# Patient Record
Sex: FEMALE | Race: WHITE | NOT HISPANIC OR LATINO | Employment: UNEMPLOYED | URBAN - METROPOLITAN AREA
[De-identification: names, ages, dates, MRNs, and addresses within clinical notes are randomized per-mention and may not be internally consistent; named-entity substitution may affect disease eponyms.]

---

## 2017-01-24 ENCOUNTER — TELEPHONE (OUTPATIENT)
Dept: PAIN MEDICINE | Facility: CLINIC | Age: 72
End: 2017-01-24
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
## 2017-07-20 ENCOUNTER — HOSPITAL ENCOUNTER (INPATIENT)
Facility: HOSPITAL | Age: 72
LOS: 5 days | Discharge: HOME | DRG: 407 | End: 2017-07-25
Attending: INTERNAL MEDICINE | Admitting: INTERNAL MEDICINE
Payer: MEDICARE

## 2017-07-20 DIAGNOSIS — K76.89 HEPATIC CYST: Primary | ICD-10-CM

## 2017-07-20 DIAGNOSIS — R10.9 ABDOMINAL PAIN: ICD-10-CM

## 2017-07-20 PROBLEM — K66.8 BILOMA: Status: ACTIVE | Noted: 2017-07-20

## 2017-07-20 PROBLEM — R10.11 RIGHT UPPER QUADRANT ABDOMINAL PAIN: Status: ACTIVE | Noted: 2017-07-20

## 2017-07-20 LAB
BASOPHILS # BLD AUTO: 0.01 K/UL
BASOPHILS NFR BLD: 0.1 %
DIFFERENTIAL METHOD: ABNORMAL
EOSINOPHIL # BLD AUTO: 0.1 K/UL
EOSINOPHIL NFR BLD: 0.6 %
ERYTHROCYTE [DISTWIDTH] IN BLOOD BY AUTOMATED COUNT: 13.2 %
HCT VFR BLD AUTO: 36.6 %
HGB BLD-MCNC: 12.3 G/DL
LYMPHOCYTES # BLD AUTO: 1.6 K/UL
LYMPHOCYTES NFR BLD: 19.5 %
MCH RBC QN AUTO: 29.5 PG
MCHC RBC AUTO-ENTMCNC: 33.6 G/DL
MCV RBC AUTO: 88 FL
MONOCYTES # BLD AUTO: 0.6 K/UL
MONOCYTES NFR BLD: 7.7 %
NEUTROPHILS # BLD AUTO: 5.8 K/UL
NEUTROPHILS NFR BLD: 72 %
PLATELET # BLD AUTO: 215 K/UL
PMV BLD AUTO: 9.6 FL
RBC # BLD AUTO: 4.17 M/UL
WBC # BLD AUTO: 8.09 K/UL

## 2017-07-20 PROCEDURE — 99223 1ST HOSP IP/OBS HIGH 75: CPT | Mod: AI,GC,, | Performed by: HOSPITALIST

## 2017-07-20 PROCEDURE — 85025 COMPLETE CBC W/AUTO DIFF WBC: CPT | Mod: 91

## 2017-07-20 PROCEDURE — 36415 COLL VENOUS BLD VENIPUNCTURE: CPT

## 2017-07-20 PROCEDURE — 25000003 PHARM REV CODE 250: Performed by: STUDENT IN AN ORGANIZED HEALTH CARE EDUCATION/TRAINING PROGRAM

## 2017-07-20 PROCEDURE — 12000002 HC ACUTE/MED SURGE SEMI-PRIVATE ROOM

## 2017-07-20 RX ORDER — OXYCODONE AND ACETAMINOPHEN 5; 325 MG/1; MG/1
1 TABLET ORAL EVERY 4 HOURS PRN
Status: DISCONTINUED | OUTPATIENT
Start: 2017-07-20 | End: 2017-07-24

## 2017-07-20 RX ORDER — HYDROCHLOROTHIAZIDE 25 MG/1
25 TABLET ORAL DAILY
Status: DISCONTINUED | OUTPATIENT
Start: 2017-07-21 | End: 2017-07-25 | Stop reason: HOSPADM

## 2017-07-20 RX ORDER — BENAZEPRIL HYDROCHLORIDE 20 MG/1
20 TABLET ORAL DAILY
Status: DISCONTINUED | OUTPATIENT
Start: 2017-07-21 | End: 2017-07-25 | Stop reason: HOSPADM

## 2017-07-20 RX ORDER — IBUPROFEN 200 MG
16 TABLET ORAL
Status: DISCONTINUED | OUTPATIENT
Start: 2017-07-20 | End: 2017-07-25 | Stop reason: HOSPADM

## 2017-07-20 RX ORDER — CARTEOLOL HYDROCHLORIDE 10 MG/ML
1 SOLUTION OPHTHALMIC 2 TIMES DAILY
Status: DISCONTINUED | OUTPATIENT
Start: 2017-07-20 | End: 2017-07-21

## 2017-07-20 RX ORDER — TRAVOPROST OPHTHALMIC SOLUTION 0.04 MG/ML
1 SOLUTION OPHTHALMIC NIGHTLY
Status: DISCONTINUED | OUTPATIENT
Start: 2017-07-20 | End: 2017-07-21

## 2017-07-20 RX ORDER — IBUPROFEN 200 MG
200 TABLET ORAL EVERY 4 HOURS PRN
Status: DISCONTINUED | OUTPATIENT
Start: 2017-07-20 | End: 2017-07-21

## 2017-07-20 RX ORDER — CIPROFLOXACIN 2 MG/ML
400 INJECTION, SOLUTION INTRAVENOUS
Status: DISCONTINUED | OUTPATIENT
Start: 2017-07-21 | End: 2017-07-25 | Stop reason: HOSPADM

## 2017-07-20 RX ORDER — METRONIDAZOLE 500 MG/100ML
500 INJECTION, SOLUTION INTRAVENOUS 3 TIMES DAILY
Status: DISCONTINUED | OUTPATIENT
Start: 2017-07-21 | End: 2017-07-25 | Stop reason: HOSPADM

## 2017-07-20 RX ORDER — GLUCAGON 1 MG
1 KIT INJECTION
Status: DISCONTINUED | OUTPATIENT
Start: 2017-07-20 | End: 2017-07-25 | Stop reason: HOSPADM

## 2017-07-20 RX ORDER — SODIUM CHLORIDE 9 MG/ML
INJECTION, SOLUTION INTRAVENOUS CONTINUOUS
Status: ACTIVE | OUTPATIENT
Start: 2017-07-20 | End: 2017-07-21

## 2017-07-20 RX ORDER — IBUPROFEN 200 MG
24 TABLET ORAL
Status: DISCONTINUED | OUTPATIENT
Start: 2017-07-20 | End: 2017-07-25 | Stop reason: HOSPADM

## 2017-07-20 RX ADMIN — IBUPROFEN 200 MG: 200 TABLET, FILM COATED ORAL at 11:07

## 2017-07-20 RX ADMIN — SODIUM CHLORIDE: 0.9 INJECTION, SOLUTION INTRAVENOUS at 11:07

## 2017-07-20 RX ADMIN — TRAVOPROST 1 DROP: 0.04 SOLUTION/ DROPS OPHTHALMIC at 11:07

## 2017-07-20 NOTE — PLAN OF CARE
Outside Transfer Acceptance Note    Transferring Physician or Mid Level Provider/Speciality: Dr. Brandon Mcrae, Emergency Medicine     Accepting Physician: Mellisa Delgado     Date of Acceptance: 07/20/2017     Transferring Facility/Hospital: [facility] ER     Reason for Transfer to [facility]: Large hepatic cyst/ biloma causing abdominal pain and needs Hepatology evaluation     Report from Transferring Physician or Mid-Level provider/Hospital course: 70 y/o female with past medical history of essential HTN and glaucoma and prior hysterectomy presented to [facility] ER this am with 1 day history of RUQ pain and associated nausea. RUQ pain appeared colicky in nature so suspected gallbladder disease so US of RUQ done that showed large slightly complex cyst appearance about the right kidney but no evidence of gallbladder disease so CT scan of abdomen was done to better visualize cystic lesion and CT scan showed a 12.4 x 9.1 cm biloma arising from the inferior liver adjacent to the gallbladder fossa with distortion of the adjacent gallbladder. Patient also with several additional smaller liver cysts also noted. Dr. Mcgrath from Hepatology was called and recommended transfer to [facility] [place] for Hepatology evaluation of large biloma. Patient given IV Zosyn x 1 dose and blood cultures done. Patient patient is afebrile with normal WBC but Dr. Mcgrath had recommended Zosyn and cultures according to Dr. Mcrae.     To do list upon patient arrival:   Consult Hepatology for evaluation for large biloma     Please call extension [phone] upon patient arrival to floor for Hospital Medicine admit team assignment and for additional admit orders. If patient is coming from another [facility] facility please also call [phone] to inform the admit team/office that patient has arrived from the [facility] facility to the floor so patient can be evaluated.      MELLISA DELGADO MD  Attending Staff Physician   Hospital Medicine  Pager:  [phone]  Spectralink: [phone]

## 2017-07-21 ENCOUNTER — ANESTHESIA EVENT (OUTPATIENT)
Dept: SURGERY | Facility: HOSPITAL | Age: 72
DRG: 407 | End: 2017-07-21
Payer: MEDICARE

## 2017-07-21 LAB
ALBUMIN SERPL BCP-MCNC: 3.3 G/DL
ALP SERPL-CCNC: 86 U/L
ALT SERPL W/O P-5'-P-CCNC: 11 U/L
ANION GAP SERPL CALC-SCNC: 8 MMOL/L
AST SERPL-CCNC: 15 U/L
BILIRUB SERPL-MCNC: 1.9 MG/DL
BUN SERPL-MCNC: 15 MG/DL
CALCIUM SERPL-MCNC: 9.1 MG/DL
CHLORIDE SERPL-SCNC: 102 MMOL/L
CO2 SERPL-SCNC: 29 MMOL/L
CREAT SERPL-MCNC: 0.8 MG/DL
EST. GFR  (AFRICAN AMERICAN): >60 ML/MIN/1.73 M^2
EST. GFR  (NON AFRICAN AMERICAN): >60 ML/MIN/1.73 M^2
GLUCOSE SERPL-MCNC: 85 MG/DL
POTASSIUM SERPL-SCNC: 3 MMOL/L
PROT SERPL-MCNC: 6.5 G/DL
SODIUM SERPL-SCNC: 139 MMOL/L

## 2017-07-21 PROCEDURE — 36415 COLL VENOUS BLD VENIPUNCTURE: CPT

## 2017-07-21 PROCEDURE — 80053 COMPREHEN METABOLIC PANEL: CPT

## 2017-07-21 PROCEDURE — 99233 SBSQ HOSP IP/OBS HIGH 50: CPT | Mod: GC,,, | Performed by: HOSPITALIST

## 2017-07-21 PROCEDURE — 25000003 PHARM REV CODE 250: Performed by: STUDENT IN AN ORGANIZED HEALTH CARE EDUCATION/TRAINING PROGRAM

## 2017-07-21 PROCEDURE — 25000003 PHARM REV CODE 250: Performed by: HOSPITALIST

## 2017-07-21 PROCEDURE — 99223 1ST HOSP IP/OBS HIGH 75: CPT | Mod: GC,,, | Performed by: INTERNAL MEDICINE

## 2017-07-21 PROCEDURE — 63600175 PHARM REV CODE 636 W HCPCS: Performed by: HOSPITALIST

## 2017-07-21 PROCEDURE — 12000002 HC ACUTE/MED SURGE SEMI-PRIVATE ROOM

## 2017-07-21 RX ORDER — IBUPROFEN 400 MG/1
400 TABLET ORAL ONCE
Status: COMPLETED | OUTPATIENT
Start: 2017-07-21 | End: 2017-07-21

## 2017-07-21 RX ORDER — IBUPROFEN 400 MG/1
400 TABLET ORAL EVERY 4 HOURS PRN
Status: DISCONTINUED | OUTPATIENT
Start: 2017-07-21 | End: 2017-07-24

## 2017-07-21 RX ORDER — POTASSIUM CHLORIDE 750 MG/1
50 CAPSULE, EXTENDED RELEASE ORAL ONCE
Status: COMPLETED | OUTPATIENT
Start: 2017-07-21 | End: 2017-07-21

## 2017-07-21 RX ADMIN — METRONIDAZOLE 500 MG: 500 INJECTION, SOLUTION INTRAVENOUS at 06:07

## 2017-07-21 RX ADMIN — OXYCODONE HYDROCHLORIDE AND ACETAMINOPHEN 1 TABLET: 5; 325 TABLET ORAL at 08:07

## 2017-07-21 RX ADMIN — CIPROFLOXACIN 400 MG: 2 INJECTION, SOLUTION INTRAVENOUS at 01:07

## 2017-07-21 RX ADMIN — CIPROFLOXACIN 400 MG: 2 INJECTION, SOLUTION INTRAVENOUS at 12:07

## 2017-07-21 RX ADMIN — METRONIDAZOLE 500 MG: 500 INJECTION, SOLUTION INTRAVENOUS at 02:07

## 2017-07-21 RX ADMIN — POTASSIUM CHLORIDE 50 MEQ: 750 CAPSULE, EXTENDED RELEASE ORAL at 09:07

## 2017-07-21 RX ADMIN — HYDROCHLOROTHIAZIDE 25 MG: 25 TABLET ORAL at 09:07

## 2017-07-21 RX ADMIN — BENAZEPRIL HYDROCHLORIDE 20 MG: 20 TABLET, FILM COATED ORAL at 09:07

## 2017-07-21 RX ADMIN — IBUPROFEN 400 MG: 400 TABLET, FILM COATED ORAL at 12:07

## 2017-07-21 NOTE — HPI
Report from Transferring Physician or Mid-Level provider/Hospital course: 72 y/o female with past medical history of essential HTN and glaucoma and prior hysterectomy presented to [facility] this am with 1 day history of RUQ pain and associated nausea. RUQ pain appeared colicky in nature so suspected gallbladder disease so US of RUQ done that showed large slightly complex cyst appearance about the right kidney but no evidence of gallbladder disease so CT scan of abdomen was done to better visualize cystic lesion and CT scan showed a 12.4 x 9.1 cm biloma arising from the inferior liver adjacent to the gallbladder fossa with distortion of the adjacent gallbladder. Patient also with several additional smaller liver cysts also noted. Dr. Mcgrath from Hepatology was called and recommended transfer to [facility] for Hepatology evaluation of large biloma. Patient given IV Zosyn x 1 dose and blood cultures done. Patient patient is afebrile with normal WBC but Dr. Mcgrath had recommended Zosyn and cultures according to Dr. Mcrae.     Patient states that pain is exacerbated by movement and breathing and is also positional, worse when lying flat. Pain was acute and sudden in onset. Patient states that she noticed that she was short of breath for the last couple weeks and recalls no other symptoms prior to this episode.Patient also endorses nausea and dry heaving but no emesis. Patient denies any recent travel history. Patient denies and change in bowel habits, diarrhea, dysuria, fever, weight loss.

## 2017-07-21 NOTE — ANESTHESIA PREPROCEDURE EVALUATION
07/21/2017  Pauline Rodriguez is a 71 y.o., female with a PMH of HTN and glaucoma who presented to OSH ED for RUQ pain found to have hepatic cyst who presents for:    Pre-operative evaluation for Procedure(s) (LRB):  LAPAROSCOPIC CYST FENESTRATION, POSSIBLE CHOLECYSTECTOMY  (N/A)    Diagnostic Studies:  7/20/17 CT ab pelvis  12.4 x 9.1 cm biloma arising from the inferior liver adjacent to the gallbladder fossa with distortion of the adjacent gallbladder. Several additional smaller liver cysts also noted.    Duodenal diverticulum at the level of the pancreatic head measuring 3.2 cm.    Small left renal cyst.    No acute findings.    Access:        Peripheral IV - Single Lumen 07/21/17 1020 Forearm (Active)   Site Assessment Clean;Dry;Intact;No redness;No swelling 7/21/2017 12:04 PM   Line Status Flushed;Saline locked 7/21/2017 12:04 PM   Dressing Status Clean;Dry;Intact 7/21/2017 12:04 PM   Dressing Intervention New dressing 7/21/2017 12:04 PM   Site Change Due 07/25/17 7/21/2017 12:04 PM   Number of days: 0       Patient Active Problem List   Diagnosis    Essential hypertension    Right upper quadrant abdominal pain    Biloma       Review of patient's allergies indicates:  No Known Allergies     No current facility-administered medications on file prior to encounter.      Current Outpatient Prescriptions on File Prior to Encounter   Medication Sig Dispense Refill    benazepril (LOTENSIN) 20 MG tablet Take 1 tablet (20 mg total) by mouth once daily. 90 tablet 3    hydrochlorothiazide (HYDRODIURIL) 25 MG tablet Take 1 tablet (25 mg total) by mouth once daily. 90 tablet 3    timolol (BETIMOL) 0.5 % ophthalmic solution Place 1 drop into both eyes 2 (two) times daily.        travoprost, benzalkonium, (TRAVATAN) 0.004 % ophthalmic solution 1 drop every evening.           Past Surgical History:   Procedure  Laterality Date    HYSTERECTOMY         Social History     Social History    Marital status:      Spouse name: N/A    Number of children: N/A    Years of education: N/A     Occupational History    Not on file.     Social History Main Topics    Smoking status: Never Smoker    Smokeless tobacco: Never Used    Alcohol use No    Drug use: Unknown    Sexual activity: Not on file     Other Topics Concern    Not on file     Social History Narrative    No narrative on file         Vital Signs Range (Last 24H):  Temp:  [36.5 °C (97.7 °F)-36.9 °C (98.5 °F)]   Pulse:  [54-71]   Resp:  [16-18]   BP: (110-178)/(59-81)   SpO2:  [89 %-95 %]       CBC:   Recent Labs      07/20/17   0716  07/20/17   2325   WBC  8.34  8.09   RBC  4.02  4.17   HGB  12.0  12.3   HCT  35.3*  36.6*   PLT  242  215   MCV  88  88   MCH  29.9  29.5   MCHC  34.0  33.6       CMP:   Recent Labs      07/20/17   0716  07/21/17   0507   NA  142  139   K  3.5  3.0*   CL  102  102   CO2  32*  29   BUN  17  15   CREATININE  0.64  0.8   GLU  98  85   CALCIUM  10.5*  9.1   ALBUMIN  4.3  3.3*   PROT  7.3  6.5   ALKPHOS  100  86   ALT  27  11   AST  25  15   BILITOT  1.8*  1.9*       INR  No results for input(s): INR, PROTIME, APTT in the last 72 hours.    Invalid input(s): PT        Anesthesia Evaluation    I have reviewed the Patient Summary Reports.     I have reviewed the Medications.     Review of Systems  Anesthesia Hx:  No problems with previous Anesthesia Denies Hx of Anesthetic complications  History of prior surgery of interest to airway management or planning:  Denies Personal Hx of Anesthesia complications.   Social:  Non-Smoker    Hematology/Oncology:  Hematology Normal   Oncology Normal     EENT/Dental:EENT/Dental Normal   Cardiovascular:   Exercise tolerance: good Hypertension    Pulmonary:  Pulmonary Normal    Renal/:  Renal/ Normal     Hepatic/GI:   Symptomatic hepatic cyst    Musculoskeletal:  Musculoskeletal Normal     Neurological:  Neurology Normal    Endocrine:  Endocrine Normal    Psych:  Psychiatric Normal           Physical Exam  General:  Obesity    Airway/Jaw/Neck:  Airway Findings: Mouth Opening: Normal Tongue: Normal  General Airway Assessment: Adult  Mallampati: II  TM Distance: Normal, at least 6 cm  Jaw/Neck Findings:  Neck ROM: Normal ROM     Eyes/Ears/Nose:  EYES/EARS/NOSE FINDINGS: Normal   Dental:  Dental Findings: In tact    Chest/Lungs:  Chest/Lungs Findings: Normal Respiratory Rate     Heart/Vascular:  Heart Findings: Rate: Normal        Mental Status:  Mental Status Findings:  Cooperative, Alert and Oriented         Anesthesia Plan  Type of Anesthesia, risks & benefits discussed:  Anesthesia Type:  general  Patient's Preference:   Intra-op Monitoring Plan: standard ASA monitors  Intra-op Monitoring Plan Comments:   Post Op Pain Control Plan: per primary service following discharge from PACU, IV/PO Opioids PRN and multimodal analgesia  Post Op Pain Control Plan Comments:   Induction:   IV  Beta Blocker:  Patient is not currently on a Beta-Blocker (No further documentation required).       Informed Consent: Patient understands risks and agrees with Anesthesia plan.  Questions answered. Anesthesia consent signed with patient.  ASA Score: 3     Day of Surgery Review of History & Physical:    H&P update referred to the provider.         Ready For Surgery From Anesthesia Perspective.

## 2017-07-21 NOTE — CONSULTS
"[facility]  General Surgery  Consult Note    Inpatient consult to General Surgery  Consult performed by: SHAUNA MORAN  Consult ordered by: LATRICIA DAMON  Reason for consult: further eval for bilioma, per interventional radiology  Assessment/Recommendations: 72 yo W presenting with a large, symptomatic hepatic cyst    Plan:  -This patient is presenting with a large, symptomatic hepatic cyst. Discussed case with Dr. Cummins (Surgical Oncologist); we recommend laparoscopic approach for surgical intervention.  -Will obtain MRCP for further evaluation of liver and biliary anatomy  -Consent for surgery to be done  -Medical management per primary team  -Call with questions    Shauna Moran MD  General Surgery  PGY-4  [phone] (pager)        Subjective:     Chief Complaint/Reason for Admission: "biloma"; large hepatic cyst    History of Present Illness: 72 yo W with a PMH of HTN and cataracts who presents as a transfer from an OSH for evaluation of a large hepatic cyst noted on imaging. She states that she started having RUQ abdominal pain about 2 days ago. She denies any other associated symptoms such as nausea, vomiting, diarrhea or epigastric pain. Prior to this, she was doing well and in her usual state of health. She has never had any abdominal surgeries.    Current Facility-Administered Medications on File Prior to Encounter   Medication    [COMPLETED] morphine injection 2 mg    [COMPLETED] piperacillin-tazobactam 4.5 g in dextrose 5 % 100 mL IVPB (ready to mix system)    [DISCONTINUED] ondansetron injection 4 mg     Current Outpatient Prescriptions on File Prior to Encounter   Medication Sig    benazepril (LOTENSIN) 20 MG tablet Take 1 tablet (20 mg total) by mouth once daily.    hydrochlorothiazide (HYDRODIURIL) 25 MG tablet Take 1 tablet (25 mg total) by mouth once daily.    timolol (BETIMOL) 0.5 % ophthalmic solution Place 1 drop into both eyes 2 (two) times daily.      " travoprost, benzalkonium, (TRAVATAN) 0.004 % ophthalmic solution 1 drop every evening.         Review of patient's allergies indicates:  No Known Allergies    Past Medical History:   Diagnosis Date    Essential hypertension      Past Surgical History:   Procedure Laterality Date    HYSTERECTOMY       Family History     Problem Relation (Age of Onset)    Diabetes Maternal Aunt        Social History Main Topics    Smoking status: Never Smoker    Smokeless tobacco: Never Used    Alcohol use No    Drug use: Unknown    Sexual activity: Not on file     Review of Systems   Constitutional: Negative.  Negative for activity change, appetite change, fatigue and fever.   HENT: Negative.    Eyes: Negative for discharge and itching.   Respiratory: Negative for apnea, choking and shortness of breath.    Cardiovascular: Negative for chest pain and leg swelling.   Gastrointestinal: Positive for abdominal pain. Negative for constipation, diarrhea and nausea.   Endocrine: Negative.    Genitourinary: Negative for difficulty urinating, dysuria, flank pain and frequency.   Musculoskeletal: Negative.    Skin: Negative.    Hematological: Negative.      Objective:     Vital Signs (Most Recent):  Temp: 97.9 °F (36.6 °C) (07/21/17 1200)  Pulse: (!) 58 (07/21/17 1200)  Resp: 16 (07/21/17 1200)  BP: (!) 178/81 (07/21/17 1200)  SpO2: (!) 92 % (07/21/17 1200) Vital Signs (24h Range):  Temp:  [97.7 °F (36.5 °C)-98.5 °F (36.9 °C)] 97.9 °F (36.6 °C)  Pulse:  [54-71] 58  Resp:  [16-18] 16  SpO2:  [89 %-95 %] 92 %  BP: (110-178)/(59-81) 178/81     Weight: 80.3 kg (177 lb 1.6 oz)  Body mass index is 31.37 kg/m².      Intake/Output Summary (Last 24 hours) at 07/21/17 1401  Last data filed at 07/21/17 0628   Gross per 24 hour   Intake          1171.67 ml   Output                0 ml   Net          1171.67 ml       Physical Exam   Constitutional: She is oriented to person, place, and time. She appears well-developed and well-nourished.   HENT:    Head: Normocephalic and atraumatic.   Eyes: EOM are normal.   Neck: Neck supple.   Cardiovascular: Normal rate and regular rhythm.    Pulmonary/Chest: Effort normal and breath sounds normal.   Abdominal: Soft. Bowel sounds are normal. She exhibits no mass. There is tenderness in the right upper quadrant and epigastric area. There is no rebound and no guarding. No hernia.   Musculoskeletal: Normal range of motion. She exhibits no edema.   Neurological: She is alert and oriented to person, place, and time.   Skin: Skin is dry.   Psychiatric: She has a normal mood and affect. Her behavior is normal.       Significant Labs:  CBC:   Recent Labs  Lab 07/20/17  2325   WBC 8.09   RBC 4.17   HGB 12.3   HCT 36.6*      MCV 88   MCH 29.5   MCHC 33.6     CMP:   Recent Labs  Lab 07/21/17  0507   GLU 85   CALCIUM 9.1   ALBUMIN 3.3*   PROT 6.5      K 3.0*   CO2 29      BUN 15   CREATININE 0.8   ALKPHOS 86   ALT 11   AST 15   BILITOT 1.9*       Significant Diagnostics:  CT abd/pelvis:    Findings:    There is a large cyst within or adjacent to the gallbladder fossa measuring 12.4 x 9.1 cm across. Several scattered additional liver cysts are identified, the largest of these in the hepatic dome measuring 1.7 cm. The gallbladder is somewhat distorted. There is a duodenal diverticulum at the level of the pancreatic head. The pancreas, stomach, spleen, adrenal glands are normal. Kidneys demonstrate normal enhancement. There is a left renal cyst measuring 2.1 cm. No hydronephrosis. The aorta tapers normally. There is mild atherosclerotic disease. No bowel obstruction, ascites, inflammatory change, or lymphadenopathy. The bladder and rectum are normal. Bones are intact. Lung bases demonstrate mild subpleural lines.    Impression:  12.4 x 9.1 cm biloma arising from the inferior liver adjacent to the gallbladder fossa with distortion of the adjacent gallbladder. Several additional smaller liver cysts also  noted.    Duodenal diverticulum at the level of the pancreatic head measuring 3.2 cm.    Small left renal cyst.    No acute findings.    Assessment/Plan:     Active Diagnoses:    Diagnosis Date Noted POA    PRINCIPAL PROBLEM:  Biloma [K66.8] 07/20/2017 Yes    Right upper quadrant abdominal pain [R10.11] 07/20/2017 Yes    Essential hypertension [I10]  Yes      Problems Resolved During this Admission:    Diagnosis Date Noted Date Resolved POA     Thank you for your consult. I will follow-up with patient. Please contact us if you have any additional questions.    Shauna Moran MD  General Surgery  [facility]

## 2017-07-21 NOTE — PLAN OF CARE
Extended Emergency Contact Information  Primary Emergency Contact: Griffin Orellanaanda           [place] [address] [address]  Home Phone: [phone]  Mobile Phone: [phone]  Relation: Daughter    Charlotte ONTIVEROS MD Graciela  [address] / [place] [address]    No future appointments.    Payor: [employer] MANAGED MEDICARE / Plan: [employer] MEDICARE HMO / Product Type: Capitation /       [id number] [place] [facility], [address] [address], [address]  [place] [address]  Phone: [phone] Fax: [phone]       07/21/17 1510   Discharge Assessment   Assessment Type Discharge Planning Assessment   Confirmed/corrected address and phone number on facesheet? Yes   Assessment information obtained from? Patient;Medical Record   Expected Length of Stay (days) 2   Communicated expected length of stay with patient/caregiver yes   Prior to hospitilization cognitive status: Alert/Oriented   Prior to hospitalization functional status: Independent   Current cognitive status: Alert/Oriented   Current Functional Status: Independent   Arrived From home or self-care   Lives With alone   Able to Return to Prior Arrangements yes   Is patient able to care for self after discharge? Yes   How many people do you have in your home that can help with your care after discharge? 0   Patient's perception of discharge disposition home or selfcare   Readmission Within The Last 30 Days no previous admission in last 30 days   Patient currently being followed by outpatient case management? No   Patient currently receives home health services? No   Does the patient currently use HME? No   Patient currently receives private duty nursing? N/A   Patient currently receives any other outside agency services? No   Equipment Currently Used at Home none   Do you have any problems affording any of your prescribed medications? No   Is the patient taking medications as prescribed? yes   Do you have any  financial concerns preventing you from receiving the healthcare you need? No   Does the patient have transportation to healthcare appointments? Yes   Transportation Available family or friend will provide   On Dialysis? No   Does the patient receive services at the Coumadin Clinic? No   Are there any open cases? No   Discharge Plan A Home   Discharge Plan B Home   Patient/Family In Agreement With Plan yes

## 2017-07-21 NOTE — HOSPITAL COURSE
7/21: Admitted to [facility]. Received dose of zosyn prior to transfer and continued on cipro and flagyl. NPO for now pending Hepatology evaluation of biloma and possible EUS ( Dr. Saldivar from hepatology aware of patient's transfer ). Hepatology consulted and reviewed imaging with radiology and AES. Gallbladder perforation cannot be completely ruled out at this time. General surgery consulted to evaluate for possible perforation.  7/22: MRCP showed 10 cm liver cyst near the gallbladder. gen surg is following. Scheduled for cyst drainage tomorrow am with gen surg

## 2017-07-21 NOTE — PLAN OF CARE
Problem: Patient Care Overview  Goal: Plan of Care Review  Outcome: Ongoing (interventions implemented as appropriate)  Plan of care reviewed with pt and daughter.  Understanding verbalized by both.  Pt alert and oriented x 4.  Afebrile.  VSS.   Pain relieved by prn pain medication.  NS continuously infusing at 100 ml/hr.  Antibiotics administered as ordered.  Daughter at bedside.  Pt repositions independently.  Fall precautions in pace.  Bed in lowest position.  Call light and bedside table within reach.  Safety maintained throughout shift.

## 2017-07-21 NOTE — SUBJECTIVE & OBJECTIVE
Past Medical History:   Diagnosis Date    Essential hypertension        Past Surgical History:   Procedure Laterality Date    HYSTERECTOMY         Review of patient's allergies indicates:  No Known Allergies    Current Facility-Administered Medications on File Prior to Encounter   Medication    [COMPLETED] morphine injection 2 mg    [COMPLETED] morphine injection 4 mg    [COMPLETED] omnipaque 350 iohexol 80 mL    [COMPLETED] piperacillin-tazobactam 4.5 g in dextrose 5 % 100 mL IVPB (ready to mix system)    [DISCONTINUED] ondansetron injection 4 mg     Current Outpatient Prescriptions on File Prior to Encounter   Medication Sig    benazepril (LOTENSIN) 20 MG tablet Take 1 tablet (20 mg total) by mouth once daily.    hydrochlorothiazide (HYDRODIURIL) 25 MG tablet Take 1 tablet (25 mg total) by mouth once daily.    timolol (BETIMOL) 0.5 % ophthalmic solution Place 1 drop into both eyes 2 (two) times daily.      travoprost, benzalkonium, (TRAVATAN) 0.004 % ophthalmic solution 1 drop every evening.       Family History     Problem Relation (Age of Onset)    Diabetes Maternal Aunt        Social History Main Topics    Smoking status: Never Smoker    Smokeless tobacco: Never Used    Alcohol use No    Drug use: Unknown    Sexual activity: Not on file     Review of Systems   Constitutional: Negative for chills, diaphoresis, fatigue, fever and unexpected weight change.   Eyes: Negative for photophobia and visual disturbance.   Respiratory: Positive for shortness of breath. Negative for cough.    Cardiovascular: Negative for chest pain, palpitations and leg swelling.   Gastrointestinal: Positive for abdominal pain. Negative for abdominal distention, blood in stool, constipation, diarrhea, nausea and vomiting.   Genitourinary: Negative for difficulty urinating, dysuria and hematuria.   Musculoskeletal: Negative for arthralgias and myalgias.   Neurological: Positive for dizziness and light-headedness. Negative for  headaches.     Objective:     Vital Signs (Most Recent):  Temp: 98.3 °F (36.8 °C) (07/20/17 2137)  Pulse: 71 (07/20/17 2137)  Resp: 18 (07/20/17 2137)  BP: 120/70 (07/20/17 2137)  SpO2: 95 % (07/20/17 2137) Vital Signs (24h Range):  Temp:  [97.8 °F (36.6 °C)-98.3 °F (36.8 °C)] 98.3 °F (36.8 °C)  Pulse:  [62-71] 71  Resp:  [16-18] 18  SpO2:  [95 %-99 %] 95 %  BP: (110-157)/(66-86) 120/70     Weight: 80.3 kg (177 lb 1.6 oz)  Body mass index is 31.37 kg/m².    Physical Exam   Constitutional: She is oriented to person, place, and time. She appears well-developed and well-nourished. No distress.   HENT:   Head: Normocephalic and atraumatic.   Eyes: EOM are normal. Pupils are equal, round, and reactive to light.   Cardiovascular: Normal rate and normal heart sounds.    Pulmonary/Chest: Effort normal and breath sounds normal.   Abdominal: Soft. She exhibits no distension. There is tenderness. There is no rebound.   TTP in RUQ   Musculoskeletal: She exhibits no tenderness.   Neurological: She is oriented to person, place, and time.   Skin: Skin is warm and dry.   Psychiatric: She has a normal mood and affect.        Significant Labs:   CBC:   Recent Labs  Lab 07/20/17 0716   WBC 8.34   HGB 12.0   HCT 35.3*        CMP:   Recent Labs  Lab 07/20/17 0716      K 3.5      CO2 32*   GLU 98   BUN 17   CREATININE 0.64   CALCIUM 10.5*   PROT 7.3   ALBUMIN 4.3   BILITOT 1.8*   ALKPHOS 100   AST 25   ALT 27   ANIONGAP 8   EGFRNONAA >60     All pertinent labs within the past 24 hours have been reviewed.    Imaging Results    None         Significant Imaging: I have reviewed all pertinent imaging results/findings within the past 24 hours.

## 2017-07-21 NOTE — ASSESSMENT & PLAN NOTE
-Consulting hepatology regarding biloma  -NPO  -Continue antibiotic coverage [id number]

## 2017-07-21 NOTE — PROGRESS NOTES
Plan for laparoscopic cyst fenestration and possible cholecystectomy on 7/24/17. MRCP to be obtained prior to surgery (order placed). Consents obtained and in chart.    Patient may be started on diet and advanced as tolerated.    Shauna Moran MD  General Surgery  PGY-4  [phone] (pager)

## 2017-07-21 NOTE — H&P
[facility] Medicine  History & Physical    Patient Name: Pauline Rodriguez  MRN: [id number]  Admission Date: 7/20/2017  Attending Physician: Darlene Garduno MD   Primary Care Provider: Charlotte Owens MD    [facility] Medicine Team: [facility] HOSP MED 1 Dustin Alanis MD     Patient information was obtained from patient, relative(s) and ER records.     Subjective:     Principal Problem:Biloma    Chief Complaint: No chief complaint on file.       HPI: Report from Transferring Physician or Mid-Level provider/Hospital course: 72 y/o female with past medical history of essential HTN and glaucoma and prior hysterectomy presented to [facility] ER this am with 1 day history of RUQ pain and associated nausea. RUQ pain appeared colicky in nature so suspected gallbladder disease so US of RUQ done that showed large slightly complex cyst appearance about the right kidney but no evidence of gallbladder disease so CT scan of abdomen was done to better visualize cystic lesion and CT scan showed a 12.4 x 9.1 cm biloma arising from the inferior liver adjacent to the gallbladder fossa with distortion of the adjacent gallbladder. Patient also with several additional smaller liver cysts also noted. Dr. Mcgrath from Hepatology was called and recommended transfer to [facility] main [place] for Hepatology evaluation of large biloma. Patient given IV Zosyn x 1 dose and blood cultures done. Patient patient is afebrile with normal WBC but Dr. Mcgrath had recommended Zosyn and cultures according to Dr. Mcrae.     Patient states that pain is exacerbated by movement and breathing and is also positional, worse when lying flat. Pain was acute and sudden in onset. Patient states that she noticed that she was short of breath for the last couple weeks and recalls no other symptoms prior to this episode.Patient also endorses nausea and dry heaving but no emesis. Patient denies any recent travel history. Patient denies and change in  bowel habits, diarrhea, dysuria, fever, weight loss.     Past Medical History:   Diagnosis Date    Essential hypertension        Past Surgical History:   Procedure Laterality Date    HYSTERECTOMY         Review of patient's allergies indicates:  No Known Allergies    Current Facility-Administered Medications on File Prior to Encounter   Medication    [COMPLETED] morphine injection 2 mg    [COMPLETED] morphine injection 4 mg    [COMPLETED] omnipaque 350 iohexol 80 mL    [COMPLETED] piperacillin-tazobactam 4.5 g in dextrose 5 % 100 mL IVPB (ready to mix system)    [DISCONTINUED] ondansetron injection 4 mg     Current Outpatient Prescriptions on File Prior to Encounter   Medication Sig    benazepril (LOTENSIN) 20 MG tablet Take 1 tablet (20 mg total) by mouth once daily.    hydrochlorothiazide (HYDRODIURIL) 25 MG tablet Take 1 tablet (25 mg total) by mouth once daily.    timolol (BETIMOL) 0.5 % ophthalmic solution Place 1 drop into both eyes 2 (two) times daily.      travoprost, benzalkonium, (TRAVATAN) 0.004 % ophthalmic solution 1 drop every evening.       Family History     Problem Relation (Age of Onset)    Diabetes Maternal Aunt        Social History Main Topics    Smoking status: Never Smoker    Smokeless tobacco: Never Used    Alcohol use No    Drug use: Unknown    Sexual activity: Not on file     Review of Systems   Constitutional: Negative for chills, diaphoresis, fatigue, fever and unexpected weight change.   Eyes: Negative for photophobia and visual disturbance.   Respiratory: Positive for shortness of breath. Negative for cough.    Cardiovascular: Negative for chest pain, palpitations and leg swelling.   Gastrointestinal: Positive for abdominal pain. Negative for abdominal distention, blood in stool, constipation, diarrhea, nausea and vomiting.   Genitourinary: Negative for difficulty urinating, dysuria and hematuria.   Musculoskeletal: Negative for arthralgias and myalgias.   Neurological:  Positive for dizziness and light-headedness. Negative for headaches.     Objective:     Vital Signs (Most Recent):  Temp: 98.3 °F (36.8 °C) (07/20/17 2137)  Pulse: 71 (07/20/17 2137)  Resp: 18 (07/20/17 2137)  BP: 120/70 (07/20/17 2137)  SpO2: 95 % (07/20/17 2137) Vital Signs (24h Range):  Temp:  [97.8 °F (36.6 °C)-98.3 °F (36.8 °C)] 98.3 °F (36.8 °C)  Pulse:  [62-71] 71  Resp:  [16-18] 18  SpO2:  [95 %-99 %] 95 %  BP: (110-157)/(66-86) 120/70     Weight: 80.3 kg (177 lb 1.6 oz)  Body mass index is 31.37 kg/m².    Physical Exam   Constitutional: She is oriented to person, place, and time. She appears well-developed and well-nourished. No distress.   HENT:   Head: Normocephalic and atraumatic.   Eyes: EOM are normal. Pupils are equal, round, and reactive to light.   Cardiovascular: Normal rate and normal heart sounds.    Pulmonary/Chest: Effort normal and breath sounds normal.   Abdominal: Soft. She exhibits no distension. There is tenderness. There is no rebound.   TTP in RUQ   Musculoskeletal: She exhibits no tenderness.   Neurological: She is oriented to person, place, and time.   Skin: Skin is warm and dry.   Psychiatric: She has a normal mood and affect.        Significant Labs:   CBC:   Recent Labs  Lab 07/20/17  0716   WBC 8.34   HGB 12.0   HCT 35.3*        CMP:   Recent Labs  Lab 07/20/17  0716      K 3.5      CO2 32*   GLU 98   BUN 17   CREATININE 0.64   CALCIUM 10.5*   PROT 7.3   ALBUMIN 4.3   BILITOT 1.8*   ALKPHOS 100   AST 25   ALT 27   ANIONGAP 8   EGFRNONAA >60     All pertinent labs within the past 24 hours have been reviewed.    Imaging Results    None         Significant Imaging: I have reviewed all pertinent imaging results/findings within the past 24 hours.    Assessment/Plan:     Right upper quadrant abdominal pain    -Motrin 200MG PRN q4h  -Oxycodone-acetaminophen 5-325MG PRN q4h          Essential hypertension    -Continue to monitor VS  -Continue HCTZ 25MG Daily  -Continue  Lotensin 20MG daily          * Biloma    -Consulting hepatology regarding biloma  -NPO  -Continue antibiotic coverage [id number]             VTE Risk Mitigation         Ordered     Medium Risk of VTE  Once      07/20/17 2218     Place CARIDAD hose  Until discontinued      07/20/17 2218     Place sequential compression device  Until discontinued      07/20/17 2218        Dustin Alanis MD  Department of Hospital Medicine   [facility]

## 2017-07-21 NOTE — TREATMENT PLAN
AES Progress note    Consult surgery  Continue antibiotics  We will review the images with radiology and AES    Jared Kuo MD  Gastroenterology & Hepatology Fellow ([id number])  Pager: [phone]

## 2017-07-21 NOTE — SUBJECTIVE & OBJECTIVE
Review of Systems   Constitutional: Negative for activity change, appetite change and fever.   Eyes: Negative for pain and redness.   Respiratory: Negative for cough and shortness of breath.    Cardiovascular: Negative for chest pain and palpitations.   Gastrointestinal: Positive for abdominal pain and nausea. Negative for abdominal distention, blood in stool, constipation, diarrhea and vomiting.   Genitourinary: Negative for dysuria, flank pain and frequency.   Musculoskeletal: Negative for arthralgias and myalgias.   Skin: Negative for color change and rash.   Neurological: Negative for dizziness and numbness.   Psychiatric/Behavioral: Negative for behavioral problems and confusion.       Past Medical History:   Diagnosis Date    Essential hypertension        Past Surgical History:   Procedure Laterality Date    HYSTERECTOMY         Family history of liver disease: No    Review of patient's allergies indicates:  No Known Allergies    Social History Main Topics    Smoking status: Never Smoker    Smokeless tobacco: Never Used    Alcohol use No    Drug use: Unknown    Sexual activity: Not on file       Prescriptions Prior to Admission   Medication Sig Dispense Refill Last Dose    benazepril (LOTENSIN) 20 MG tablet Take 1 tablet (20 mg total) by mouth once daily. 90 tablet 3 7/20/2017 at morning    hydrochlorothiazide (HYDRODIURIL) 25 MG tablet Take 1 tablet (25 mg total) by mouth once daily. 90 tablet 3 7/20/2017 at morning    timolol (BETIMOL) 0.5 % ophthalmic solution Place 1 drop into both eyes 2 (two) times daily.     7/20/2017 at morning    travoprost, benzalkonium, (TRAVATAN) 0.004 % ophthalmic solution 1 drop every evening.     7/19/2017 at evening       Objective:     Vital Signs (Most Recent):  Temp: 97.9 °F (36.6 °C) (07/21/17 1200)  Pulse: (!) 58 (07/21/17 1200)  Resp: 16 (07/21/17 1200)  BP: (!) 178/81 (07/21/17 1200)  SpO2: (!) 92 % (07/21/17 1200) Vital Signs (24h Range):  Temp:  [97.7 °F  (36.5 °C)-98.5 °F (36.9 °C)] 97.9 °F (36.6 °C)  Pulse:  [54-71] 58  Resp:  [16-18] 16  SpO2:  [89 %-95 %] 92 %  BP: (110-178)/(59-81) 178/81     Weight: 80.3 kg (177 lb 1.6 oz) (07/20/17 2137)  Body mass index is 31.37 kg/m².    Physical Exam   Constitutional: She is oriented to person, place, and time. She appears well-developed and well-nourished.   HENT:   Head: Normocephalic and atraumatic.   Eyes: EOM are normal. Pupils are equal, round, and reactive to light. No scleral icterus.   Neck: Normal range of motion. Neck supple.   Cardiovascular: Normal rate, regular rhythm and normal heart sounds.    Pulmonary/Chest: Effort normal and breath sounds normal.   Abdominal: Soft. There is tenderness (very TTP in RUQ with guarding).   Neurological: She is alert and oriented to person, place, and time.   Skin: Skin is warm and dry.   Psychiatric: She has a normal mood and affect. Her behavior is normal.       Significant Labs:    Recent Results (from the past 24 hour(s))   CBC with Automated Differential    Collection Time: 07/20/17 11:25 PM   Result Value Ref Range    WBC 8.09 3.90 - 12.70 K/uL    RBC 4.17 4.00 - 5.40 M/uL    Hemoglobin 12.3 12.0 - 16.0 g/dL    Hematocrit 36.6 (L) 37.0 - 48.5 %    MCV 88 82 - 98 fL    MCH 29.5 27.0 - 31.0 pg    MCHC 33.6 32.0 - 36.0 g/dL    RDW 13.2 11.5 - 14.5 %    Platelets 215 150 - 350 K/uL    MPV 9.6 9.2 - 12.9 fL    Gran # 5.8 1.8 - 7.7 K/uL    Lymph # 1.6 1.0 - 4.8 K/uL    Mono # 0.6 0.3 - 1.0 K/uL    Eos # 0.1 0.0 - 0.5 K/uL    Baso # 0.01 0.00 - 0.20 K/uL    Gran% 72.0 38.0 - 73.0 %    Lymph% 19.5 18.0 - 48.0 %    Mono% 7.7 4.0 - 15.0 %    Eosinophil% 0.6 0.0 - 8.0 %    Basophil% 0.1 0.0 - 1.9 %    Differential Method Automated    Comprehensive Metabolic Panel (CMP)    Collection Time: 07/21/17  5:07 AM   Result Value Ref Range    Sodium 139 136 - 145 mmol/L    Potassium 3.0 (L) 3.5 - 5.1 mmol/L    Chloride 102 95 - 110 mmol/L    CO2 29 23 - 29 mmol/L    Glucose 85 70 - 110  mg/dL    BUN, Bld 15 8 - 23 mg/dL    Creatinine 0.8 0.5 - 1.4 mg/dL    Calcium 9.1 8.7 - 10.5 mg/dL    Total Protein 6.5 6.0 - 8.4 g/dL    Albumin 3.3 (L) 3.5 - 5.2 g/dL    Total Bilirubin 1.9 (H) 0.1 - 1.0 mg/dL    Alkaline Phosphatase 86 55 - 135 U/L    AST 15 10 - 40 U/L    ALT 11 10 - 44 U/L    Anion Gap 8 8 - 16 mmol/L    eGFR if African American >60.0 >60 mL/min/1.73 m^2    eGFR if non African American >60.0 >60 mL/min/1.73 m^2       Significant Imaging:    Significant imaging reviewed and noted in HPI.

## 2017-07-21 NOTE — HPI
Mrs. Rodriguez is a 72 yo W with a PMH of HTN and glaucoma who presented to OSH ED for RUQ pain. RUQ pain appeared colicky in nature so suspected gallbladder disease so US of RUQ done that showed large slightly complex cyst appearance about the right kidney but no evidence of gallbladder disease so CT scan of abdomen was done to better visualize cystic lesion and CT scan showed a 12.4 x 9.1 cm hypodensity arising from the inferior liver adjacent to the gallbladder fossa with distortion of the adjacent gallbladder. Patient also with several additional smaller liver cysts also noted. From CT , biloma could not be ruled out. Dr. Mcgrath from Hepatology was called and recommended transfer to [facility] for Hepatology evaluation. Patient given IV Zosyn x 1 dose and blood cultures done.

## 2017-07-21 NOTE — ASSESSMENT & PLAN NOTE
- CT abdo (7/20): 12.4 x 9.1 cm biloma arising from the inferior liver adjacent to the gallbladder fossa with distortion of the adjacent gallbladder  -Continue Ciprofloxacin 400 mg BID and Flagyl 500 mg TID empirically for possible intraabdominal infection.  -Afebrile, no leukocytosis, LFTs normal. T.Bili mildly elevated  -Hepatology following. Outside imaging reviewed with radiology and AES. Cannot rule-out gallbladder perforation at this time.  -General surgery consulted. Appreciate recommendations  -NPO

## 2017-07-21 NOTE — ASSESSMENT & PLAN NOTE
-Likely secondary to Biloma  -Motrin 200MG PRN q4h  -Oxycodone-acetaminophen 5-325MG PRN q4h  -See Biloma for plan

## 2017-07-21 NOTE — SUBJECTIVE & OBJECTIVE
Interval History: No acute events overnight. Patient reports continued abdominal pain with motion or lying flat. She ambulates without increased pain. She understands need for medications, but wishes to be informed of any medication she receives.    Review of Systems   Constitutional: Negative for chills, diaphoresis, fatigue, fever and unexpected weight change.   Eyes: Negative for photophobia and visual disturbance.   Respiratory: Positive for shortness of breath. Negative for cough.    Cardiovascular: Negative for chest pain, palpitations and leg swelling.   Gastrointestinal: Positive for abdominal pain. Negative for abdominal distention, blood in stool, constipation, diarrhea, nausea and vomiting.   Genitourinary: Negative for difficulty urinating, dysuria and hematuria.   Musculoskeletal: Negative for arthralgias and myalgias.   Neurological: Positive for dizziness and light-headedness. Negative for headaches.     Objective:     Vital Signs (Most Recent):  Temp: 97.7 °F (36.5 °C) (07/21/17 0822)  Pulse: (!) 56 (07/21/17 0822)  Resp: 16 (07/21/17 0822)  BP: (!) 125/59 (07/21/17 0822)  SpO2: 95 % (07/21/17 0822) Vital Signs (24h Range):  Temp:  [97.7 °F (36.5 °C)-98.5 °F (36.9 °C)] 97.7 °F (36.5 °C)  Pulse:  [54-71] 56  Resp:  [16-18] 16  SpO2:  [89 %-99 %] 95 %  BP: (110-150)/(59-83) 125/59     Weight: 80.3 kg (177 lb 1.6 oz)  Body mass index is 31.37 kg/m².    Intake/Output Summary (Last 24 hours) at 07/21/17 1119  Last data filed at 07/21/17 0628   Gross per 24 hour   Intake          1171.67 ml   Output                0 ml   Net          1171.67 ml      Physical Exam   Constitutional: She is oriented to person, place, and time. She appears well-developed and well-nourished. No distress.   HENT:   Head: Normocephalic and atraumatic.   Eyes: EOM are normal. Pupils are equal, round, and reactive to light.   Cardiovascular: Normal rate and normal heart sounds.    Pulmonary/Chest: Effort normal and breath sounds  normal.   Abdominal: Soft. She exhibits no distension. There is tenderness. There is no rebound.   TTP in RUQ   Musculoskeletal: She exhibits no tenderness.   Neurological: She is oriented to person, place, and time.   Skin: Skin is warm and dry.   Psychiatric: She has a normal mood and affect.       Significant Labs:   BMP:   Recent Labs  Lab 07/21/17  0507   GLU 85      K 3.0*      CO2 29   BUN 15   CREATININE 0.8   CALCIUM 9.1     CBC:   Recent Labs  Lab 07/20/17  0716 07/20/17  2325   WBC 8.34 8.09   HGB 12.0 12.3   HCT 35.3* 36.6*    215       Significant Imaging: I have reviewed all pertinent imaging results/findings within the past 24 hours.

## 2017-07-21 NOTE — CONSULTS
[facility]  Hepatology  Consult Note    Patient Name: aPuline Rodriguez  MRN: [id number]  Admission Date: 7/20/2017  Hospital Length of Stay: 1 days  Attending Provider: Darlene Garduno MD   Primary Care Physician: Charlotte Owens MD  Principal Problem:Biloma    Consults  Subjective:     Transplant status: No    HPI:  Mrs. Rodriguez is a 70 yo W with a PMH of HTN and glaucoma who presented to OSH ED for RUQ pain. RUQ pain appeared colicky in nature so suspected gallbladder disease so US of RUQ done that showed large slightly complex cyst appearance about the right kidney but no evidence of gallbladder disease so CT scan of abdomen was done to better visualize cystic lesion and CT scan showed a 12.4 x 9.1 cm hypodensity arising from the inferior liver adjacent to the gallbladder fossa with distortion of the adjacent gallbladder. Patient also with several additional smaller liver cysts also noted. From CT , biloma could not be ruled out. Dr. Mcgrath from Hepatology was called and recommended transfer to [facility] for Hepatology evaluation. Patient given IV Zosyn x 1 dose and blood cultures done.     Review of Systems   Constitutional: Negative for activity change, appetite change and fever.   Eyes: Negative for pain and redness.   Respiratory: Negative for cough and shortness of breath.    Cardiovascular: Negative for chest pain and palpitations.   Gastrointestinal: Positive for abdominal pain and nausea. Negative for abdominal distention, blood in stool, constipation, diarrhea and vomiting.   Genitourinary: Negative for dysuria, flank pain and frequency.   Musculoskeletal: Negative for arthralgias and myalgias.   Skin: Negative for color change and rash.   Neurological: Negative for dizziness and numbness.   Psychiatric/Behavioral: Negative for behavioral problems and confusion.       Past Medical History:   Diagnosis Date    Essential hypertension        Past Surgical History:   Procedure  Laterality Date    HYSTERECTOMY         Family history of liver disease: No    Review of patient's allergies indicates:  No Known Allergies    Social History Main Topics    Smoking status: Never Smoker    Smokeless tobacco: Never Used    Alcohol use No    Drug use: Unknown    Sexual activity: Not on file       Prescriptions Prior to Admission   Medication Sig Dispense Refill Last Dose    benazepril (LOTENSIN) 20 MG tablet Take 1 tablet (20 mg total) by mouth once daily. 90 tablet 3 7/20/2017 at morning    hydrochlorothiazide (HYDRODIURIL) 25 MG tablet Take 1 tablet (25 mg total) by mouth once daily. 90 tablet 3 7/20/2017 at morning    timolol (BETIMOL) 0.5 % ophthalmic solution Place 1 drop into both eyes 2 (two) times daily.     7/20/2017 at morning    travoprost, benzalkonium, (TRAVATAN) 0.004 % ophthalmic solution 1 drop every evening.     7/19/2017 at evening       Objective:     Vital Signs (Most Recent):  Temp: 97.9 °F (36.6 °C) (07/21/17 1200)  Pulse: (!) 58 (07/21/17 1200)  Resp: 16 (07/21/17 1200)  BP: (!) 178/81 (07/21/17 1200)  SpO2: (!) 92 % (07/21/17 1200) Vital Signs (24h Range):  Temp:  [97.7 °F (36.5 °C)-98.5 °F (36.9 °C)] 97.9 °F (36.6 °C)  Pulse:  [54-71] 58  Resp:  [16-18] 16  SpO2:  [89 %-95 %] 92 %  BP: (110-178)/(59-81) 178/81     Weight: 80.3 kg (177 lb 1.6 oz) (07/20/17 2137)  Body mass index is 31.37 kg/m².    Physical Exam   Constitutional: She is oriented to person, place, and time. She appears well-developed and well-nourished.   HENT:   Head: Normocephalic and atraumatic.   Eyes: EOM are normal. Pupils are equal, round, and reactive to light. No scleral icterus.   Neck: Normal range of motion. Neck supple.   Cardiovascular: Normal rate, regular rhythm and normal heart sounds.    Pulmonary/Chest: Effort normal and breath sounds normal.   Abdominal: Soft. There is tenderness (very TTP in RUQ with guarding).   Neurological: She is alert and oriented to person, place, and time.    Skin: Skin is warm and dry.   Psychiatric: She has a normal mood and affect. Her behavior is normal.       Significant Labs:    Recent Results (from the past 24 hour(s))   CBC with Automated Differential    Collection Time: 07/20/17 11:25 PM   Result Value Ref Range    WBC 8.09 3.90 - 12.70 K/uL    RBC 4.17 4.00 - 5.40 M/uL    Hemoglobin 12.3 12.0 - 16.0 g/dL    Hematocrit 36.6 (L) 37.0 - 48.5 %    MCV 88 82 - 98 fL    MCH 29.5 27.0 - 31.0 pg    MCHC 33.6 32.0 - 36.0 g/dL    RDW 13.2 11.5 - 14.5 %    Platelets 215 150 - 350 K/uL    MPV 9.6 9.2 - 12.9 fL    Gran # 5.8 1.8 - 7.7 K/uL    Lymph # 1.6 1.0 - 4.8 K/uL    Mono # 0.6 0.3 - 1.0 K/uL    Eos # 0.1 0.0 - 0.5 K/uL    Baso # 0.01 0.00 - 0.20 K/uL    Gran% 72.0 38.0 - 73.0 %    Lymph% 19.5 18.0 - 48.0 %    Mono% 7.7 4.0 - 15.0 %    Eosinophil% 0.6 0.0 - 8.0 %    Basophil% 0.1 0.0 - 1.9 %    Differential Method Automated    Comprehensive Metabolic Panel (CMP)    Collection Time: 07/21/17  5:07 AM   Result Value Ref Range    Sodium 139 136 - 145 mmol/L    Potassium 3.0 (L) 3.5 - 5.1 mmol/L    Chloride 102 95 - 110 mmol/L    CO2 29 23 - 29 mmol/L    Glucose 85 70 - 110 mg/dL    BUN, Bld 15 8 - 23 mg/dL    Creatinine 0.8 0.5 - 1.4 mg/dL    Calcium 9.1 8.7 - 10.5 mg/dL    Total Protein 6.5 6.0 - 8.4 g/dL    Albumin 3.3 (L) 3.5 - 5.2 g/dL    Total Bilirubin 1.9 (H) 0.1 - 1.0 mg/dL    Alkaline Phosphatase 86 55 - 135 U/L    AST 15 10 - 40 U/L    ALT 11 10 - 44 U/L    Anion Gap 8 8 - 16 mmol/L    eGFR if African American >60.0 >60 mL/min/1.73 m^2    eGFR if non African American >60.0 >60 mL/min/1.73 m^2       Significant Imaging:    Significant imaging reviewed and noted in HPI.    Assessment/Plan:     Hepatic cyst vs biloma     Surgery consulted. Recommended AES evaluate for minimally invasive interventions.    Continue antibiotics   We will review the images with radiology and AES    Thank you for your consult. I will follow-up with patient. Please contact us if you  have any additional questions.    Madhu Mclaughlin MD  Hepatology  [facility]

## 2017-07-21 NOTE — PROGRESS NOTES
[facility] Hospital Medicine  Progress Note    Patient Name: Pauline Rodriguez  MRN: [id number]  Patient Class: IP- Inpatient   Admission Date: 7/20/2017  Length of Stay: 1 days  Attending Physician: Darlene Garduno MD  Primary Care Provider: Charlotte Owens MD    Hospital Medicine Team: [facility] [place] [facility] 1 Carmelo Oscar MD    Subjective:     Principal Problem:Biloma    HPI:  Report from Transferring Physician or Mid-Level provider/Hospital course: 72 y/o female with past medical history of essential HTN and glaucoma and prior hysterectomy presented to [facility] ER this am with 1 day history of RUQ pain and associated nausea. RUQ pain appeared colicky in nature so suspected gallbladder disease so US of RUQ done that showed large slightly complex cyst appearance about the right kidney but no evidence of gallbladder disease so CT scan of abdomen was done to better visualize cystic lesion and CT scan showed a 12.4 x 9.1 cm biloma arising from the inferior liver adjacent to the gallbladder fossa with distortion of the adjacent gallbladder. Patient also with several additional smaller liver cysts also noted. Dr. Mcgrath from Hepatology was called and recommended transfer to [facility] [place] for Hepatology evaluation of large biloma. Patient given IV Zosyn x 1 dose and blood cultures done. Patient patient is afebrile with normal WBC but Dr. Mcgrath had recommended Zosyn and cultures according to Dr. Mcrae.     Patient states that pain is exacerbated by movement and breathing and is also positional, worse when lying flat. Pain was acute and sudden in onset. Patient states that she noticed that she was short of breath for the last couple weeks and recalls no other symptoms prior to this episode.Patient also endorses nausea and dry heaving but no emesis. Patient denies any recent travel history. Patient denies and change in bowel habits, diarrhea, dysuria, fever, weight loss.     Hospital Course:  7/21:  Admitted to IM1. Received dose of zosyn prior to transfer and continued on cipro and flagyl. NPO for now pending Hepatology evaluation of biloma and possible EUS ( Dr. Saldivar from hepatology aware of patient's transfer ). Hepatology consulted and reviewed imaging with radiology and AES. Gallbladder perforation cannot be completely ruled out at this time. General surgery consulted to evaluate for possible perforation.     Interval History: No acute events overnight. Patient reports continued abdominal pain with motion or lying flat. She ambulates without increased pain. She understands need for medications, but wishes to be informed of any medication she receives.    Review of Systems   Constitutional: Negative for chills, diaphoresis, fatigue, fever and unexpected weight change.   Eyes: Negative for photophobia and visual disturbance.   Respiratory: Positive for shortness of breath. Negative for cough.    Cardiovascular: Negative for chest pain, palpitations and leg swelling.   Gastrointestinal: Positive for abdominal pain. Negative for abdominal distention, blood in stool, constipation, diarrhea, nausea and vomiting.   Genitourinary: Negative for difficulty urinating, dysuria and hematuria.   Musculoskeletal: Negative for arthralgias and myalgias.   Neurological: Positive for dizziness and light-headedness. Negative for headaches.     Objective:     Vital Signs (Most Recent):  Temp: 97.7 °F (36.5 °C) (07/21/17 0822)  Pulse: (!) 56 (07/21/17 0822)  Resp: 16 (07/21/17 0822)  BP: (!) 125/59 (07/21/17 0822)  SpO2: 95 % (07/21/17 0822) Vital Signs (24h Range):  Temp:  [97.7 °F (36.5 °C)-98.5 °F (36.9 °C)] 97.7 °F (36.5 °C)  Pulse:  [54-71] 56  Resp:  [16-18] 16  SpO2:  [89 %-99 %] 95 %  BP: (110-150)/(59-83) 125/59     Weight: 80.3 kg (177 lb 1.6 oz)  Body mass index is 31.37 kg/m².    Intake/Output Summary (Last 24 hours) at 07/21/17 1119  Last data filed at 07/21/17 0655   Gross per 24 hour   Intake          1171.67  ml   Output                0 ml   Net          1171.67 ml      Physical Exam   Constitutional: She is oriented to person, place, and time. She appears well-developed and well-nourished. No distress.   HENT:   Head: Normocephalic and atraumatic.   Eyes: EOM are normal. Pupils are equal, round, and reactive to light.   Cardiovascular: Normal rate and normal heart sounds.    Pulmonary/Chest: Effort normal and breath sounds normal.   Abdominal: Soft. She exhibits no distension. There is tenderness. There is no rebound.   TTP in RUQ   Musculoskeletal: She exhibits no tenderness.   Neurological: She is oriented to person, place, and time.   Skin: Skin is warm and dry.   Psychiatric: She has a normal mood and affect.       Significant Labs:   BMP:   Recent Labs  Lab 07/21/17  0507   GLU 85      K 3.0*      CO2 29   BUN 15   CREATININE 0.8   CALCIUM 9.1     CBC:   Recent Labs  Lab 07/20/17  0716 07/20/17  2325   WBC 8.34 8.09   HGB 12.0 12.3   HCT 35.3* 36.6*    215       Significant Imaging: I have reviewed all pertinent imaging results/findings within the past 24 hours.    Assessment/Plan:      Right upper quadrant abdominal pain    -Likely secondary to Biloma  -Motrin 200MG PRN q4h  -Oxycodone-acetaminophen 5-325MG PRN q4h  -See Biloma for plan          * Biloma    - CT abdo (7/20): 12.4 x 9.1 cm biloma arising from the inferior liver adjacent to the gallbladder fossa with distortion of the adjacent gallbladder  -Continue Ciprofloxacin 400 mg BID and Flagyl 500 mg TID empirically for possible intraabdominal infection.  -Afebrile, no leukocytosis, LFTs normal. T.Bili mildly elevated  -Hepatology following. Outside imaging reviewed with radiology and AES. Cannot rule-out gallbladder perforation at this time.  -General surgery consulted. Appreciate recommendations  -NPO            Essential hypertension    -Continue home HCTZ 25 mg daily and Lotensin 20 mg daily  -Will continue to monitor          VTE  Risk Mitigation         Ordered     Medium Risk of VTE  Once      07/20/17 2218     Place CARIDAD hose  Until discontinued      07/20/17 2218     Place sequential compression device  Until discontinued      07/20/17 2218        Dispo: To home pending evaluation and management plan for biloma. Surgery consulted, appreciate recommendations.    Carmelo Oscar MD  Department of Hospital Medicine   [facility]

## 2017-07-22 PROCEDURE — 12000002 HC ACUTE/MED SURGE SEMI-PRIVATE ROOM

## 2017-07-22 PROCEDURE — 25000003 PHARM REV CODE 250: Performed by: STUDENT IN AN ORGANIZED HEALTH CARE EDUCATION/TRAINING PROGRAM

## 2017-07-22 PROCEDURE — 25500020 PHARM REV CODE 255: Performed by: HOSPITALIST

## 2017-07-22 PROCEDURE — 25000003 PHARM REV CODE 250: Performed by: HOSPITALIST

## 2017-07-22 PROCEDURE — 63600175 PHARM REV CODE 636 W HCPCS: Performed by: HOSPITALIST

## 2017-07-22 PROCEDURE — A9585 GADOBUTROL INJECTION: HCPCS | Performed by: HOSPITALIST

## 2017-07-22 PROCEDURE — 99233 SBSQ HOSP IP/OBS HIGH 50: CPT | Mod: GC,,, | Performed by: HOSPITALIST

## 2017-07-22 RX ORDER — HYDROMORPHONE HYDROCHLORIDE 2 MG/1
2 TABLET ORAL ONCE AS NEEDED
Status: COMPLETED | OUTPATIENT
Start: 2017-07-22 | End: 2017-07-22

## 2017-07-22 RX ORDER — MIDAZOLAM HYDROCHLORIDE 1 MG/ML
2 INJECTION INTRAMUSCULAR; INTRAVENOUS ONCE AS NEEDED
Status: ACTIVE | OUTPATIENT
Start: 2017-07-22 | End: 2017-07-22

## 2017-07-22 RX ORDER — GADOBUTROL 604.72 MG/ML
10 INJECTION INTRAVENOUS
Status: COMPLETED | OUTPATIENT
Start: 2017-07-22 | End: 2017-07-22

## 2017-07-22 RX ORDER — DIAZEPAM 5 MG/1
5 TABLET ORAL ONCE AS NEEDED
Status: COMPLETED | OUTPATIENT
Start: 2017-07-22 | End: 2017-07-22

## 2017-07-22 RX ADMIN — METRONIDAZOLE 500 MG: 500 INJECTION, SOLUTION INTRAVENOUS at 06:07

## 2017-07-22 RX ADMIN — DIAZEPAM 5 MG: 5 TABLET ORAL at 02:07

## 2017-07-22 RX ADMIN — HYDROMORPHONE HYDROCHLORIDE 2 MG: 2 TABLET ORAL at 02:07

## 2017-07-22 RX ADMIN — CIPROFLOXACIN 400 MG: 2 INJECTION, SOLUTION INTRAVENOUS at 05:07

## 2017-07-22 RX ADMIN — GADOBUTROL 10 ML: 604.72 INJECTION INTRAVENOUS at 03:07

## 2017-07-22 RX ADMIN — BENAZEPRIL HYDROCHLORIDE 20 MG: 20 TABLET, FILM COATED ORAL at 08:07

## 2017-07-22 RX ADMIN — HYDROCHLOROTHIAZIDE 25 MG: 25 TABLET ORAL at 08:07

## 2017-07-22 NOTE — PROGRESS NOTES
Pt seen and examined. She couldn't tolerate the MRI due to anxiety. She is still having pain in her abdomen, no appetite, no bm since Wednesday.    Abd soft, distended, tender but no peritonitis     She needs a HIDA scan today. Ok to eat after imaging, recommend boost supplement.  OR Monday for laparoscopic resection    Minda Cowan PGY3  [phone]

## 2017-07-22 NOTE — ASSESSMENT & PLAN NOTE
- CT abdo (7/20): 12.4 x 9.1 cm biloma arising from the inferior liver adjacent to the gallbladder fossa with distortion of the adjacent gallbladder  -Continue Ciprofloxacin 400 mg BID and Flagyl 500 mg TID empirically for possible intraabdominal infection.  -Afebrile, no leukocytosis, LFTs normal. T.Bili mildly elevated  -Hepatology following. Outside imaging reviewed with radiology and AES. Cannot rule-out gallbladder perforation at this time.  -General surgery consulted. Appreciate recommendations  -MRCP today with pain/anxiety medication. Laparoscopic drainage on Monday pending MRCP  -NPO

## 2017-07-22 NOTE — PROGRESS NOTES
[facility] Hospital Medicine  Progress Note    Patient Name: Pauline Rodriguez  MRN: [id number]  Patient Class: IP- Inpatient   Admission Date: 7/20/2017  Length of Stay: 2 days  Attending Physician: Darlene Garduno MD  Primary Care Provider: Charlotte Owens MD    Hospital Medicine Team: [facility] [place]OSP MED 1 Carmelo Oscar MD    Subjective:     Principal Problem:Biloma    HPI:  Report from Transferring Physician or Mid-Level provider/Hospital course: 72 y/o female with past medical history of essential HTN and glaucoma and prior hysterectomy presented to [facility] ER this am with 1 day history of RUQ pain and associated nausea. RUQ pain appeared colicky in nature so suspected gallbladder disease so US of RUQ done that showed large slightly complex cyst appearance about the right kidney but no evidence of gallbladder disease so CT scan of abdomen was done to better visualize cystic lesion and CT scan showed a 12.4 x 9.1 cm biloma arising from the inferior liver adjacent to the gallbladder fossa with distortion of the adjacent gallbladder. Patient also with several additional smaller liver cysts also noted. Dr. Mcgrath from Hepatology was called and recommended transfer to [facility] main [place] for Hepatology evaluation of large biloma. Patient given IV Zosyn x 1 dose and blood cultures done. Patient patient is afebrile with normal WBC but Dr. Mcgrath had recommended Zosyn and cultures according to Dr. Mcrae.     Patient states that pain is exacerbated by movement and breathing and is also positional, worse when lying flat. Pain was acute and sudden in onset. Patient states that she noticed that she was short of breath for the last couple weeks and recalls no other symptoms prior to this episode.Patient also endorses nausea and dry heaving but no emesis. Patient denies any recent travel history. Patient denies and change in bowel habits, diarrhea, dysuria, fever, weight loss.     Hospital Course:  7/21:  Admitted to IM1. Received dose of zosyn prior to transfer and continued on cipro and flagyl. NPO for now pending Hepatology evaluation of biloma and possible EUS ( Dr. Saldivar from hepatology aware of patient's transfer ). Hepatology consulted and reviewed imaging with radiology and AES. Gallbladder perforation cannot be completely ruled out at this time. General surgery consulted to evaluate for possible perforation.  7/22: MRCP scheduled for today. Pre-dosed with dilaudid and valium for pain/anxiety.     Interval History: Unable to evaluate. Patient requests that only staff be in the room.    Review of Systems   Unable to perform ROS: Other (Staff only in room)     Objective:     Vital Signs (Most Recent):  Temp: 98.5 °F (36.9 °C) (07/22/17 1232)  Pulse: 69 (07/22/17 1232)  Resp: 17 (07/22/17 1232)  BP: (!) 163/75 (07/22/17 1232)  SpO2: 96 % (07/22/17 1232) Vital Signs (24h Range):  Temp:  [97.1 °F (36.2 °C)-98.5 °F (36.9 °C)] 98.5 °F (36.9 °C)  Pulse:  [52-78] 69  Resp:  [17-18] 17  SpO2:  [90 %-96 %] 96 %  BP: (135-163)/(66-86) 163/75     Weight: 80.3 kg (177 lb 1.6 oz)  Body mass index is 31.37 kg/m².  No intake or output data in the 24 hours ending 07/22/17 1336   Physical Exam   Constitutional:   Physical exam unable to assess. Staff only in room.   Nursing note and vitals reviewed.      Significant Labs: All pertinent labs within the past 24 hours have been reviewed.    Significant Imaging: I have reviewed all pertinent imaging results/findings within the past 24 hours.    Assessment/Plan:      Right upper quadrant abdominal pain    -Likely secondary to Biloma  -Motrin 200MG PRN q4h  -Oxycodone-acetaminophen 5-325MG PRN q4h  -See Biloma for plan          * Biloma    - CT abdo (7/20): 12.4 x 9.1 cm biloma arising from the inferior liver adjacent to the gallbladder fossa with distortion of the adjacent gallbladder  -Continue Ciprofloxacin 400 mg BID and Flagyl 500 mg TID empirically for possible intraabdominal  infection.  -Afebrile, no leukocytosis, LFTs normal. T.Bili mildly elevated  -Hepatology following. Outside imaging reviewed with radiology and AES. Cannot rule-out gallbladder perforation at this time.  -General surgery consulted. Appreciate recommendations  -MRCP today with pain/anxiety medication. Laparoscopic drainage on Monday pending MRCP  -NPO            Essential hypertension    -Continue home HCTZ 25 mg daily and Lotensin 20 mg daily  -Will continue to monitor          VTE Risk Mitigation         Ordered     Medium Risk of VTE  Once      07/20/17 2218     Place CARIDAD hose  Until discontinued      07/20/17 2218     Place sequential compression device  Until discontinued      07/20/17 2218        Dispo: Laparoscopic cyst drainage on Monday pending results of today's MRCP.    Carmelo Oscar MD  Department of Hospital Medicine   [facility]

## 2017-07-22 NOTE — SUBJECTIVE & OBJECTIVE
Interval History: Unable to evaluate. Patient requests that only staff be in the room.    Review of Systems   Unable to perform ROS: Other (Staff only in room)     Objective:     Vital Signs (Most Recent):  Temp: 98.5 °F (36.9 °C) (07/22/17 1232)  Pulse: 69 (07/22/17 1232)  Resp: 17 (07/22/17 1232)  BP: (!) 163/75 (07/22/17 1232)  SpO2: 96 % (07/22/17 1232) Vital Signs (24h Range):  Temp:  [97.1 °F (36.2 °C)-98.5 °F (36.9 °C)] 98.5 °F (36.9 °C)  Pulse:  [52-78] 69  Resp:  [17-18] 17  SpO2:  [90 %-96 %] 96 %  BP: (135-163)/(66-86) 163/75     Weight: 80.3 kg (177 lb 1.6 oz)  Body mass index is 31.37 kg/m².  No intake or output data in the 24 hours ending 07/22/17 1336   Physical Exam   Constitutional:   Physical exam unable to assess. Staff only in room.   Nursing note and vitals reviewed.      Significant Labs: All pertinent labs within the past 24 hours have been reviewed.    Significant Imaging: I have reviewed all pertinent imaging results/findings within the past 24 hours.

## 2017-07-22 NOTE — PLAN OF CARE
Problem: Patient Care Overview  Goal: Plan of Care Review  Outcome: Ongoing (interventions implemented as appropriate)  Patient AAO plan of care review MRI needed patient to remain NPO. Abdominal pain aggravated with lying flat. Fall precautions reviewed

## 2017-07-22 NOTE — PLAN OF CARE
Problem: Patient Care Overview  Goal: Plan of Care Review  Outcome: Ongoing (interventions implemented as appropriate)  Discussed patient's care with the family and her.  Explained that she is going for a MRI.    Problem: Fall Risk (Adult)  Goal: Identify Related Risk Factors and Signs and Symptoms  Related risk factors and signs and symptoms are identified upon initiation of Human Response Clinical Practice Guideline (CPG)   Outcome: Ongoing (interventions implemented as appropriate)  Patient ambulates steady and she is free from falls.    Problem: Pain, Acute (Adult)  Goal: Identify Related Risk Factors and Signs and Symptoms  Related risk factors and signs and symptoms are identified upon initiation of Human Response Clinical Practice Guideline (CPG)   Outcome: Ongoing (interventions implemented as appropriate)  Patient had a complaint of a headache, medication given as ordered.  Patient re-evaluated and headache is gone.

## 2017-07-23 PROBLEM — K76.89 HEPATIC CYST: Status: ACTIVE | Noted: 2017-07-23

## 2017-07-23 PROBLEM — K66.8 BILOMA: Status: RESOLVED | Noted: 2017-07-20 | Resolved: 2017-07-23

## 2017-07-23 LAB
ALBUMIN SERPL BCP-MCNC: 3.3 G/DL
ALP SERPL-CCNC: 90 U/L
ALT SERPL W/O P-5'-P-CCNC: 14 U/L
ANION GAP SERPL CALC-SCNC: 10 MMOL/L
AST SERPL-CCNC: 20 U/L
BASOPHILS # BLD AUTO: 0.02 K/UL
BASOPHILS NFR BLD: 0.3 %
BILIRUB SERPL-MCNC: 1.7 MG/DL
BUN SERPL-MCNC: 16 MG/DL
CALCIUM SERPL-MCNC: 9.2 MG/DL
CHLORIDE SERPL-SCNC: 100 MMOL/L
CO2 SERPL-SCNC: 27 MMOL/L
CREAT SERPL-MCNC: 0.8 MG/DL
DIFFERENTIAL METHOD: ABNORMAL
EOSINOPHIL # BLD AUTO: 0.3 K/UL
EOSINOPHIL NFR BLD: 3.5 %
ERYTHROCYTE [DISTWIDTH] IN BLOOD BY AUTOMATED COUNT: 13.2 %
EST. GFR  (AFRICAN AMERICAN): >60 ML/MIN/1.73 M^2
EST. GFR  (NON AFRICAN AMERICAN): >60 ML/MIN/1.73 M^2
GLUCOSE SERPL-MCNC: 89 MG/DL
HCT VFR BLD AUTO: 35.9 %
HGB BLD-MCNC: 12 G/DL
LYMPHOCYTES # BLD AUTO: 2 K/UL
LYMPHOCYTES NFR BLD: 25.5 %
MCH RBC QN AUTO: 29.2 PG
MCHC RBC AUTO-ENTMCNC: 33.4 G/DL
MCV RBC AUTO: 87 FL
MONOCYTES # BLD AUTO: 0.6 K/UL
MONOCYTES NFR BLD: 7.4 %
NEUTROPHILS # BLD AUTO: 4.9 K/UL
NEUTROPHILS NFR BLD: 63.2 %
PLATELET # BLD AUTO: 235 K/UL
PMV BLD AUTO: 9.4 FL
POTASSIUM SERPL-SCNC: 3.4 MMOL/L
PROT SERPL-MCNC: 6.5 G/DL
RBC # BLD AUTO: 4.11 M/UL
SODIUM SERPL-SCNC: 137 MMOL/L
WBC # BLD AUTO: 7.81 K/UL

## 2017-07-23 PROCEDURE — 36415 COLL VENOUS BLD VENIPUNCTURE: CPT

## 2017-07-23 PROCEDURE — 12000002 HC ACUTE/MED SURGE SEMI-PRIVATE ROOM

## 2017-07-23 PROCEDURE — 25000003 PHARM REV CODE 250: Performed by: HOSPITALIST

## 2017-07-23 PROCEDURE — 80053 COMPREHEN METABOLIC PANEL: CPT

## 2017-07-23 PROCEDURE — 99232 SBSQ HOSP IP/OBS MODERATE 35: CPT | Mod: GC,,, | Performed by: HOSPITALIST

## 2017-07-23 PROCEDURE — 25000003 PHARM REV CODE 250: Performed by: STUDENT IN AN ORGANIZED HEALTH CARE EDUCATION/TRAINING PROGRAM

## 2017-07-23 PROCEDURE — 85025 COMPLETE CBC W/AUTO DIFF WBC: CPT

## 2017-07-23 PROCEDURE — 63600175 PHARM REV CODE 636 W HCPCS: Performed by: HOSPITALIST

## 2017-07-23 PROCEDURE — 99233 SBSQ HOSP IP/OBS HIGH 50: CPT | Mod: GC,,, | Performed by: INTERNAL MEDICINE

## 2017-07-23 RX ORDER — CEFAZOLIN SODIUM 2 G/50ML
2 SOLUTION INTRAVENOUS
Status: DISCONTINUED | OUTPATIENT
Start: 2017-07-23 | End: 2017-07-24

## 2017-07-23 RX ADMIN — HYDROCHLOROTHIAZIDE 25 MG: 25 TABLET ORAL at 08:07

## 2017-07-23 RX ADMIN — METRONIDAZOLE 500 MG: 500 INJECTION, SOLUTION INTRAVENOUS at 12:07

## 2017-07-23 RX ADMIN — OXYCODONE HYDROCHLORIDE AND ACETAMINOPHEN 1 TABLET: 5; 325 TABLET ORAL at 01:07

## 2017-07-23 RX ADMIN — CIPROFLOXACIN 400 MG: 2 INJECTION, SOLUTION INTRAVENOUS at 05:07

## 2017-07-23 RX ADMIN — METRONIDAZOLE 500 MG: 500 INJECTION, SOLUTION INTRAVENOUS at 03:07

## 2017-07-23 RX ADMIN — IBUPROFEN 400 MG: 400 TABLET, FILM COATED ORAL at 07:07

## 2017-07-23 RX ADMIN — BENAZEPRIL HYDROCHLORIDE 20 MG: 20 TABLET, FILM COATED ORAL at 08:07

## 2017-07-23 RX ADMIN — METRONIDAZOLE 500 MG: 500 INJECTION, SOLUTION INTRAVENOUS at 07:07

## 2017-07-23 NOTE — ASSESSMENT & PLAN NOTE
- as per MRCP results above  - General surgery following - scheduled for cyst drainage tomorrow  - cont IV cipro and flagyl at this time, plan to d/c abx based on cyst cx results

## 2017-07-23 NOTE — PROGRESS NOTES
[facility]  Hepatology  Progress Note    Patient Name: Pauline Rodriguez  MRN: [id number]  Admission Date: 7/20/2017  Hospital Length of Stay: 3 days  Attending Provider: Darlene Garduno MD   Primary Care Physician: Charlotte Owens MD  Principal Problem:Biloma    Subjective:     Transplant status: No    HPI: Mrs. Rodriguez is a 72 yo W with a PMH of HTN and glaucoma who presented to OSH ED for RUQ pain. RUQ pain appeared colicky in nature so suspected gallbladder disease so US of RUQ done that showed large slightly complex cyst appearance about the right kidney but no evidence of gallbladder disease so CT scan of abdomen was done to better visualize cystic lesion and CT scan showed a 12.4 x 9.1 cm hypodensity arising from the inferior liver adjacent to the gallbladder fossa with distortion of the adjacent gallbladder. Patient also with several additional smaller liver cysts also noted. From CT , biloma could not be ruled out. Dr. Mcgrath from Hepatology was called and recommended transfer to [facility] for Hepatology evaluation. Patient given IV Zosyn x 1 dose and blood cultures done.     Interval History:  MRCP done. No connection.   Pending surgery's final plan  Continues to have intermittent abdominal pain.     Current Facility-Administered Medications   Medication    benazepril tablet 20 mg    cefazolin (ANCEF) 2 gram in dextrose 5% 50 mL IVPB (premix)    ciprofloxacin (CIPRO)400mg/200ml D5W IVPB 400 mg    dextrose 50% injection 12.5 g    dextrose 50% injection 25 g    glucagon (human recombinant) injection 1 mg    glucose chewable tablet 16 g    glucose chewable tablet 24 g    hydrochlorothiazide tablet 25 mg    ibuprofen tablet 400 mg    metronidazole IVPB 500 mg    oxycodone-acetaminophen 5-325 mg per tablet 1 tablet       Objective:     Vital Signs (Most Recent):  Temp: 98.1 °F (36.7 °C) (07/23/17 0713)  Pulse: (!) 58 (07/23/17 0713)  Resp: 18 (07/23/17 0713)  BP: 126/66  (07/23/17 0713)  SpO2: (!) 91 % (07/23/17 0713) Vital Signs (24h Range):  Temp:  [97 °F (36.1 °C)-98.5 °F (36.9 °C)] 98.1 °F (36.7 °C)  Pulse:  [58-71] 58  Resp:  [16-18] 18  SpO2:  [91 %-96 %] 91 %  BP: (126-163)/(65-75) 126/66     Weight: 80.3 kg (177 lb 1.6 oz) (07/20/17 2137)  Body mass index is 31.37 kg/m².    Physical Exam   Constitutional: She is oriented to person, place, and time. She appears well-developed and well-nourished.   HENT:   Head: Normocephalic and atraumatic.   Eyes: Conjunctivae are normal. No scleral icterus.   Neck: Neck supple.   Cardiovascular: Normal rate and regular rhythm.    No murmur heard.  Pulmonary/Chest: Effort normal. No stridor. No respiratory distress. She has no wheezes. She has no rales.   Abdominal: Soft. Bowel sounds are normal. She exhibits no distension and no mass. There is no tenderness. There is no rebound and no guarding. No hernia.   Musculoskeletal: She exhibits no edema or tenderness.   Neurological: She is alert and oriented to person, place, and time.   Skin: Skin is warm and dry.   Psychiatric: She has a normal mood and affect.   Vitals reviewed.    Lab Results   Component Value Date    WBC 7.81 07/23/2017    HGB 12.0 07/23/2017    HCT 35.9 (L) 07/23/2017    MCV 87 07/23/2017     07/23/2017     Lab Results   Component Value Date     07/23/2017    K 3.4 (L) 07/23/2017     07/23/2017    CO2 27 07/23/2017    BUN 16 07/23/2017    CREATININE 0.8 07/23/2017     Lab Results   Component Value Date    ALBUMIN 3.3 (L) 07/23/2017    ALT 14 07/23/2017    AST 20 07/23/2017    ALKPHOS 90 07/23/2017    BILITOT 1.7 (H) 07/23/2017     Lab Results   Component Value Date    LIPASE 36 07/20/2017       Imaging:  Reviewed and as noted in HPI.         Assessment/Plan:     Hepatic cyst    US, CT, MRCP showed likely simple cyst with no connection to biliary system.   Continues to be symptomatic with abdominal pain. No evidence of infection.     Recommend:  Surgery  eval and possible marsupialization of cyst +/-CCY            Thank you for your consult. I will follow-up with patient. Please contact us if you have any additional questions.    Jared Kuo MD  Hepatology  [facility]

## 2017-07-23 NOTE — ASSESSMENT & PLAN NOTE
US, CT, MRCP showed likely simple cyst with no connection to biliary system.   Continues to be symptomatic with abdominal pain. No evidence of infection.     Recommend:  Surgery eval and possible marsupialization of cyst +/-CCY

## 2017-07-23 NOTE — SUBJECTIVE & OBJECTIVE
Interval History:  MRCP done. No connection.   Pending surgery's final plan  Continues to have intermittent abdominal pain.     Current Facility-Administered Medications   Medication    benazepril tablet 20 mg    cefazolin (ANCEF) 2 gram in dextrose 5% 50 mL IVPB (premix)    ciprofloxacin (CIPRO)400mg/200ml D5W IVPB 400 mg    dextrose 50% injection 12.5 g    dextrose 50% injection 25 g    glucagon (human recombinant) injection 1 mg    glucose chewable tablet 16 g    glucose chewable tablet 24 g    hydrochlorothiazide tablet 25 mg    ibuprofen tablet 400 mg    metronidazole IVPB 500 mg    oxycodone-acetaminophen 5-325 mg per tablet 1 tablet       Objective:     Vital Signs (Most Recent):  Temp: 98.1 °F (36.7 °C) (07/23/17 0713)  Pulse: (!) 58 (07/23/17 0713)  Resp: 18 (07/23/17 0713)  BP: 126/66 (07/23/17 0713)  SpO2: (!) 91 % (07/23/17 0713) Vital Signs (24h Range):  Temp:  [97 °F (36.1 °C)-98.5 °F (36.9 °C)] 98.1 °F (36.7 °C)  Pulse:  [58-71] 58  Resp:  [16-18] 18  SpO2:  [91 %-96 %] 91 %  BP: (126-163)/(65-75) 126/66     Weight: 80.3 kg (177 lb 1.6 oz) (07/20/17 2137)  Body mass index is 31.37 kg/m².    Physical Exam   Constitutional: She is oriented to person, place, and time. She appears well-developed and well-nourished.   HENT:   Head: Normocephalic and atraumatic.   Eyes: Conjunctivae are normal. No scleral icterus.   Neck: Neck supple.   Cardiovascular: Normal rate and regular rhythm.    No murmur heard.  Pulmonary/Chest: Effort normal. No stridor. No respiratory distress. She has no wheezes. She has no rales.   Abdominal: Soft. Bowel sounds are normal. She exhibits no distension and no mass. There is no tenderness. There is no rebound and no guarding. No hernia.   Musculoskeletal: She exhibits no edema or tenderness.   Neurological: She is alert and oriented to person, place, and time.   Skin: Skin is warm and dry.   Psychiatric: She has a normal mood and affect.   Vitals reviewed.    Lab  Results   Component Value Date    WBC 7.81 07/23/2017    HGB 12.0 07/23/2017    HCT 35.9 (L) 07/23/2017    MCV 87 07/23/2017     07/23/2017     Lab Results   Component Value Date     07/23/2017    K 3.4 (L) 07/23/2017     07/23/2017    CO2 27 07/23/2017    BUN 16 07/23/2017    CREATININE 0.8 07/23/2017     Lab Results   Component Value Date    ALBUMIN 3.3 (L) 07/23/2017    ALT 14 07/23/2017    AST 20 07/23/2017    ALKPHOS 90 07/23/2017    BILITOT 1.7 (H) 07/23/2017     Lab Results   Component Value Date    LIPASE 36 07/20/2017       Imaging:  Reviewed and as noted in HPI.

## 2017-07-23 NOTE — PROGRESS NOTES
"Night Shift:  Plan of care reviewed with patient at beginning on shift.  0305  Patient stated that her pain rating is 7/10, when informed that she had oxycodone/apap 5/325, patient stated that when she took it before (7/21) it "was like taking an m&m." and that "it didn't even touch the pain.  I'll wait."  "

## 2017-07-23 NOTE — PROGRESS NOTES
[facility] Hospital Medicine  Progress Note    Patient Name: Pauline Rodriguez  MRN: [id number]  Patient Class: IP- Inpatient   Admission Date: 7/20/2017  Length of Stay: 3 days  Attending Physician: Darlene Garduno MD  Primary Care Provider: Charlotte Owens MD    Hospital Medicine Team: [facility] HOSP MED 1 Rafaela Kapadia MD    Subjective:     Principal Problem:Biloma    HPI:  Report from Transferring Physician or Mid-Level provider/Hospital course: 70 y/o female with past medical history of essential HTN and glaucoma and prior hysterectomy presented to [facility] ER this am with 1 day history of RUQ pain and associated nausea. RUQ pain appeared colicky in nature so suspected gallbladder disease so US of RUQ done that showed large slightly complex cyst appearance about the right kidney but no evidence of gallbladder disease so CT scan of abdomen was done to better visualize cystic lesion and CT scan showed a 12.4 x 9.1 cm biloma arising from the inferior liver adjacent to the gallbladder fossa with distortion of the adjacent gallbladder. Patient also with several additional smaller liver cysts also noted. Dr. Mcgrath from Hepatology was called and recommended transfer to [facility] [place] for Hepatology evaluation of large biloma. Patient given IV Zosyn x 1 dose and blood cultures done. Patient patient is afebrile with normal WBC but Dr. Mcgrath had recommended Zosyn and cultures according to Dr. Mcrae.     Patient states that pain is exacerbated by movement and breathing and is also positional, worse when lying flat. Pain was acute and sudden in onset. Patient states that she noticed that she was short of breath for the last couple weeks and recalls no other symptoms prior to this episode.Patient also endorses nausea and dry heaving but no emesis. Patient denies any recent travel history. Patient denies and change in bowel habits, diarrhea, dysuria, fever, weight loss.     Hospital Course:  7/21:  Admitted to IM1. Received dose of zosyn prior to transfer and continued on cipro and flagyl. NPO for now pending Hepatology evaluation of biloma and possible EUS ( Dr. Saldivar from hepatology aware of patient's transfer ). Hepatology consulted and reviewed imaging with radiology and AES. Gallbladder perforation cannot be completely ruled out at this time. General surgery consulted to evaluate for possible perforation.  7/22: MRCP showed 10 cm liver cyst near the gallbladder. gen surg is following. Scheduled for cyst drainage tomorrow am with gen surg    Interval History:     Doing well over night, having frustration with her surgery timing and details of operation. MRCP completed and showed 10 cm hepatic cyst. Pain is well controlled.     Review of Systems   Constitutional: Negative for activity change, appetite change, chills and fever.   Respiratory: Negative for cough and shortness of breath.    Cardiovascular: Negative for chest pain and leg swelling.   Gastrointestinal: Positive for abdominal pain. Negative for abdominal distention and vomiting.   Genitourinary: Negative for difficulty urinating and dysuria.   Musculoskeletal: Negative for arthralgias and back pain.   Neurological: Negative for weakness, light-headedness and headaches.   Psychiatric/Behavioral: Negative for confusion.     Objective:     Vital Signs (Most Recent):  Temp: 98.3 °F (36.8 °C) (07/23/17 1110)  Pulse: 66 (07/23/17 1110)  Resp: 18 (07/23/17 1110)  BP: (!) 141/81 (07/23/17 1110)  SpO2: (!) 93 % (07/23/17 1110) Vital Signs (24h Range):  Temp:  [97 °F (36.1 °C)-98.3 °F (36.8 °C)] 98.3 °F (36.8 °C)  Pulse:  [58-71] 66  Resp:  [16-18] 18  SpO2:  [91 %-95 %] 93 %  BP: (126-141)/(65-81) 141/81     Weight: 80.3 kg (177 lb 1.6 oz)  Body mass index is 31.37 kg/m².  No intake or output data in the 24 hours ending 07/23/17 1511   Physical Exam   Constitutional: She is oriented to person, place, and time. She appears well-developed and  well-nourished. No distress.   HENT:   Head: Normocephalic and atraumatic.   Right Ear: External ear normal.   Left Ear: External ear normal.   Eyes: EOM are normal. Pupils are equal, round, and reactive to light.   Neck: Normal range of motion.   Cardiovascular: Normal rate, regular rhythm and normal heart sounds.    Pulmonary/Chest: Effort normal and breath sounds normal.   Abdominal: Soft. She exhibits no distension. There is tenderness. There is no rebound.   TTP in RUQ   Musculoskeletal: Normal range of motion. She exhibits no edema or tenderness.   Neurological: She is alert and oriented to person, place, and time. No cranial nerve deficit.   Skin: Skin is warm and dry. She is not diaphoretic.   Psychiatric: She has a normal mood and affect.       Significant Labs:   CBC:   Recent Labs  Lab 07/23/17  0535   WBC 7.81   HGB 12.0   HCT 35.9*        CMP:   Recent Labs  Lab 07/23/17  0535      K 3.4*      CO2 27   GLU 89   BUN 16   CREATININE 0.8   CALCIUM 9.2   PROT 6.5   ALBUMIN 3.3*   BILITOT 1.7*   ALKPHOS 90   AST 20   ALT 14   ANIONGAP 10   EGFRNONAA >60.0       Significant Imaging:     MRCP  Large 10 cm right subhepatic lobe cyst vs choledochocyst (type II), noting a discrete connection with the common bile duct difficult to identify, and may instead be communicating with the cystic duct. There is mild narrowing of the main hepatic duct with slight prominence of the central intrahepatic ducts. A HIDA scan could be performed for further evaluation, if indicated.    Multiple additional hepatic cysts, largest measuring 1.8 cm.    Assessment/Plan:      Hepatic cyst    - as per MRCP results above  - General surgery following - scheduled for cyst drainage tomorrow  - cont IV cipro and flagyl at this time, plan to d/c abx based on cyst cx results        Right upper quadrant abdominal pain    -Motrin 200MG PRN q4h  -Oxycodone-acetaminophen 5-325MG PRN q4h          Essential hypertension     -Continue home HCTZ 25 mg daily and Lotensin 20 mg daily  -Will continue to monitor          VTE Risk Mitigation         Ordered     Medium Risk of VTE  Once      07/20/17 2218     Place CARIDAD hose  Until discontinued      07/20/17 2218     Place sequential compression device  Until discontinued      07/20/17 2218        tentatively planning for transfer to gen surg service tomorrow post op    Rafaela Kapadia MD  Department of Hospital Medicine   [facility]

## 2017-07-23 NOTE — PLAN OF CARE
Problem: Patient Care Overview  Goal: Plan of Care Review  Outcome: Ongoing (interventions implemented as appropriate)  Discussed patient care with her and her son.    Problem: Fall Risk (Adult)  Goal: Identify Related Risk Factors and Signs and Symptoms  Related risk factors and signs and symptoms are identified upon initiation of Human Response Clinical Practice Guideline (CPG)   Outcome: Ongoing (interventions implemented as appropriate)  Patient has been free from falls and ambulates steady.    Problem: Pain, Acute (Adult)  Goal: Identify Related Risk Factors and Signs and Symptoms  Related risk factors and signs and symptoms are identified upon initiation of Human Response Clinical Practice Guideline (CPG)   Outcome: Ongoing (interventions implemented as appropriate)  Monitored pain throughout the shift and gave medication as ordered.

## 2017-07-23 NOTE — SUBJECTIVE & OBJECTIVE
Interval History:     Doing well over night, having frustration with her surgery timing and details of operation. MRCP completed and showed 10 cm hepatic cyst. Pain is well controlled.     Review of Systems   Constitutional: Negative for activity change, appetite change, chills and fever.   Respiratory: Negative for cough and shortness of breath.    Cardiovascular: Negative for chest pain and leg swelling.   Gastrointestinal: Positive for abdominal pain. Negative for abdominal distention and vomiting.   Genitourinary: Negative for difficulty urinating and dysuria.   Musculoskeletal: Negative for arthralgias and back pain.   Neurological: Negative for weakness, light-headedness and headaches.   Psychiatric/Behavioral: Negative for confusion.     Objective:     Vital Signs (Most Recent):  Temp: 98.3 °F (36.8 °C) (07/23/17 1110)  Pulse: 66 (07/23/17 1110)  Resp: 18 (07/23/17 1110)  BP: (!) 141/81 (07/23/17 1110)  SpO2: (!) 93 % (07/23/17 1110) Vital Signs (24h Range):  Temp:  [97 °F (36.1 °C)-98.3 °F (36.8 °C)] 98.3 °F (36.8 °C)  Pulse:  [58-71] 66  Resp:  [16-18] 18  SpO2:  [91 %-95 %] 93 %  BP: (126-141)/(65-81) 141/81     Weight: 80.3 kg (177 lb 1.6 oz)  Body mass index is 31.37 kg/m².  No intake or output data in the 24 hours ending 07/23/17 1511   Physical Exam   Constitutional: She is oriented to person, place, and time. She appears well-developed and well-nourished. No distress.   HENT:   Head: Normocephalic and atraumatic.   Right Ear: External ear normal.   Left Ear: External ear normal.   Eyes: EOM are normal. Pupils are equal, round, and reactive to light.   Neck: Normal range of motion.   Cardiovascular: Normal rate, regular rhythm and normal heart sounds.    Pulmonary/Chest: Effort normal and breath sounds normal.   Abdominal: Soft. She exhibits no distension. There is tenderness. There is no rebound.   TTP in RUQ   Musculoskeletal: Normal range of motion. She exhibits no edema or tenderness.    Neurological: She is alert and oriented to person, place, and time. No cranial nerve deficit.   Skin: Skin is warm and dry. She is not diaphoretic.   Psychiatric: She has a normal mood and affect.       Significant Labs:   CBC:   Recent Labs  Lab 07/23/17  0535   WBC 7.81   HGB 12.0   HCT 35.9*        CMP:   Recent Labs  Lab 07/23/17  0535      K 3.4*      CO2 27   GLU 89   BUN 16   CREATININE 0.8   CALCIUM 9.2   PROT 6.5   ALBUMIN 3.3*   BILITOT 1.7*   ALKPHOS 90   AST 20   ALT 14   ANIONGAP 10   EGFRNONAA >60.0       Significant Imaging:     MRCP  Large 10 cm right subhepatic lobe cyst vs choledochocyst (type II), noting a discrete connection with the common bile duct difficult to identify, and may instead be communicating with the cystic duct. There is mild narrowing of the main hepatic duct with slight prominence of the central intrahepatic ducts. A HIDA scan could be performed for further evaluation, if indicated.    Multiple additional hepatic cysts, largest measuring 1.8 cm.

## 2017-07-24 ENCOUNTER — SURGERY (OUTPATIENT)
Age: 72
End: 2017-07-24

## 2017-07-24 ENCOUNTER — ANESTHESIA (OUTPATIENT)
Dept: SURGERY | Facility: HOSPITAL | Age: 72
DRG: 407 | End: 2017-07-24
Payer: MEDICARE

## 2017-07-24 PROBLEM — E87.6 HYPOKALEMIA: Status: ACTIVE | Noted: 2017-07-24

## 2017-07-24 LAB
ABO + RH BLD: NORMAL
ALBUMIN SERPL BCP-MCNC: 3.5 G/DL
ALP SERPL-CCNC: 83 U/L
ALT SERPL W/O P-5'-P-CCNC: 13 U/L
ANION GAP SERPL CALC-SCNC: 12 MMOL/L
AST SERPL-CCNC: 19 U/L
BASOPHILS # BLD AUTO: 0.01 K/UL
BASOPHILS NFR BLD: 0.2 %
BILIRUB SERPL-MCNC: 1.4 MG/DL
BLD GP AB SCN CELLS X3 SERPL QL: NORMAL
BUN SERPL-MCNC: 19 MG/DL
CALCIUM SERPL-MCNC: 9.2 MG/DL
CHLORIDE SERPL-SCNC: 103 MMOL/L
CO2 SERPL-SCNC: 26 MMOL/L
CREAT SERPL-MCNC: 0.9 MG/DL
DIFFERENTIAL METHOD: ABNORMAL
EOSINOPHIL # BLD AUTO: 0.4 K/UL
EOSINOPHIL NFR BLD: 5.9 %
ERYTHROCYTE [DISTWIDTH] IN BLOOD BY AUTOMATED COUNT: 13.2 %
EST. GFR  (AFRICAN AMERICAN): >60 ML/MIN/1.73 M^2
EST. GFR  (NON AFRICAN AMERICAN): >60 ML/MIN/1.73 M^2
GLUCOSE SERPL-MCNC: 97 MG/DL
HCT VFR BLD AUTO: 36.2 %
HGB BLD-MCNC: 12 G/DL
INR PPP: 1
LYMPHOCYTES # BLD AUTO: 2.1 K/UL
LYMPHOCYTES NFR BLD: 33.5 %
MCH RBC QN AUTO: 29.1 PG
MCHC RBC AUTO-ENTMCNC: 33.1 G/DL
MCV RBC AUTO: 88 FL
MONOCYTES # BLD AUTO: 0.8 K/UL
MONOCYTES NFR BLD: 12.7 %
NEUTROPHILS # BLD AUTO: 3 K/UL
NEUTROPHILS NFR BLD: 47.5 %
PLATELET # BLD AUTO: 236 K/UL
PMV BLD AUTO: 9.6 FL
POCT GLUCOSE: 183 MG/DL (ref 70–110)
POTASSIUM SERPL-SCNC: 2.9 MMOL/L
PROT SERPL-MCNC: 6.5 G/DL
PROTHROMBIN TIME: 11 SEC
RBC # BLD AUTO: 4.13 M/UL
SODIUM SERPL-SCNC: 141 MMOL/L
WBC # BLD AUTO: 6.32 K/UL

## 2017-07-24 PROCEDURE — 88307 TISSUE EXAM BY PATHOLOGIST: CPT

## 2017-07-24 PROCEDURE — 63600175 PHARM REV CODE 636 W HCPCS: Performed by: HOSPITALIST

## 2017-07-24 PROCEDURE — 36000710: Performed by: SURGERY

## 2017-07-24 PROCEDURE — 63600175 PHARM REV CODE 636 W HCPCS: Performed by: SURGERY

## 2017-07-24 PROCEDURE — 63600175 PHARM REV CODE 636 W HCPCS: Performed by: STUDENT IN AN ORGANIZED HEALTH CARE EDUCATION/TRAINING PROGRAM

## 2017-07-24 PROCEDURE — 0FB14ZZ EXCISION OF RIGHT LOBE LIVER, PERCUTANEOUS ENDOSCOPIC APPROACH: ICD-10-PCS | Performed by: SURGERY

## 2017-07-24 PROCEDURE — 63600175 PHARM REV CODE 636 W HCPCS: Performed by: ANESTHESIOLOGY

## 2017-07-24 PROCEDURE — 88307 TISSUE EXAM BY PATHOLOGIST: CPT | Mod: 26,,,

## 2017-07-24 PROCEDURE — 63600175 PHARM REV CODE 636 W HCPCS

## 2017-07-24 PROCEDURE — 25000003 PHARM REV CODE 250: Performed by: STUDENT IN AN ORGANIZED HEALTH CARE EDUCATION/TRAINING PROGRAM

## 2017-07-24 PROCEDURE — 85610 PROTHROMBIN TIME: CPT

## 2017-07-24 PROCEDURE — 36000711: Performed by: SURGERY

## 2017-07-24 PROCEDURE — 47379 UNLISTED LAPS PX LIVER: CPT | Mod: 59,,, | Performed by: SURGERY

## 2017-07-24 PROCEDURE — 86900 BLOOD TYPING SEROLOGIC ABO: CPT

## 2017-07-24 PROCEDURE — 25000003 PHARM REV CODE 250: Performed by: SURGERY

## 2017-07-24 PROCEDURE — 71000033 HC RECOVERY, INTIAL HOUR: Performed by: SURGERY

## 2017-07-24 PROCEDURE — 37000008 HC ANESTHESIA 1ST 15 MINUTES: Performed by: SURGERY

## 2017-07-24 PROCEDURE — 99232 SBSQ HOSP IP/OBS MODERATE 35: CPT | Mod: ,,, | Performed by: HOSPITALIST

## 2017-07-24 PROCEDURE — 12000002 HC ACUTE/MED SURGE SEMI-PRIVATE ROOM

## 2017-07-24 PROCEDURE — 85025 COMPLETE CBC W/AUTO DIFF WBC: CPT

## 2017-07-24 PROCEDURE — 25000003 PHARM REV CODE 250: Performed by: HOSPITALIST

## 2017-07-24 PROCEDURE — D9220A PRA ANESTHESIA: Mod: ,,, | Performed by: ANESTHESIOLOGY

## 2017-07-24 PROCEDURE — 80053 COMPREHEN METABOLIC PANEL: CPT

## 2017-07-24 PROCEDURE — S0030 INJECTION, METRONIDAZOLE: HCPCS | Performed by: HOSPITALIST

## 2017-07-24 PROCEDURE — 86850 RBC ANTIBODY SCREEN: CPT

## 2017-07-24 PROCEDURE — 76998 US GUIDE INTRAOP: CPT | Mod: 26,,, | Performed by: SURGERY

## 2017-07-24 PROCEDURE — 27201423 OPTIME MED/SURG SUP & DEVICES STERILE SUPPLY: Performed by: SURGERY

## 2017-07-24 PROCEDURE — 37000009 HC ANESTHESIA EA ADD 15 MINS: Performed by: SURGERY

## 2017-07-24 PROCEDURE — 99231 SBSQ HOSP IP/OBS SF/LOW 25: CPT | Mod: ,,, | Performed by: INTERNAL MEDICINE

## 2017-07-24 PROCEDURE — 27000221 HC OXYGEN, UP TO 24 HOURS

## 2017-07-24 PROCEDURE — 71000039 HC RECOVERY, EACH ADD'L HOUR: Performed by: SURGERY

## 2017-07-24 RX ORDER — ONDANSETRON 2 MG/ML
4 INJECTION INTRAMUSCULAR; INTRAVENOUS EVERY 8 HOURS PRN
Status: DISCONTINUED | OUTPATIENT
Start: 2017-07-24 | End: 2017-07-25 | Stop reason: HOSPADM

## 2017-07-24 RX ORDER — LIDOCAINE HCL/PF 100 MG/5ML
SYRINGE (ML) INTRAVENOUS
Status: DISCONTINUED | OUTPATIENT
Start: 2017-07-24 | End: 2017-07-24

## 2017-07-24 RX ORDER — KETAMINE HCL IN 0.9 % NACL 50 MG/5 ML
SYRINGE (ML) INTRAVENOUS
Status: DISCONTINUED | OUTPATIENT
Start: 2017-07-24 | End: 2017-07-24

## 2017-07-24 RX ORDER — GLYCOPYRROLATE 0.2 MG/ML
INJECTION INTRAMUSCULAR; INTRAVENOUS
Status: DISCONTINUED | OUTPATIENT
Start: 2017-07-24 | End: 2017-07-24

## 2017-07-24 RX ORDER — PHENYLEPHRINE HYDROCHLORIDE 10 MG/ML
INJECTION INTRAVENOUS
Status: DISCONTINUED | OUTPATIENT
Start: 2017-07-24 | End: 2017-07-24

## 2017-07-24 RX ORDER — BUPIVACAINE HYDROCHLORIDE 2.5 MG/ML
INJECTION, SOLUTION EPIDURAL; INFILTRATION; INTRACAUDAL
Status: DISCONTINUED | OUTPATIENT
Start: 2017-07-24 | End: 2017-07-24 | Stop reason: HOSPADM

## 2017-07-24 RX ORDER — ONDANSETRON 2 MG/ML
4 INJECTION INTRAMUSCULAR; INTRAVENOUS ONCE
Status: COMPLETED | OUTPATIENT
Start: 2017-07-24 | End: 2017-07-24

## 2017-07-24 RX ORDER — PROPOFOL 10 MG/ML
VIAL (ML) INTRAVENOUS
Status: DISCONTINUED | OUTPATIENT
Start: 2017-07-24 | End: 2017-07-24

## 2017-07-24 RX ORDER — NEOSTIGMINE METHYLSULFATE 1 MG/ML
INJECTION, SOLUTION INTRAVENOUS
Status: DISCONTINUED | OUTPATIENT
Start: 2017-07-24 | End: 2017-07-24

## 2017-07-24 RX ORDER — ACETAMINOPHEN 10 MG/ML
INJECTION, SOLUTION INTRAVENOUS
Status: DISCONTINUED | OUTPATIENT
Start: 2017-07-24 | End: 2017-07-24

## 2017-07-24 RX ORDER — ACETAMINOPHEN 500 MG
500 TABLET ORAL EVERY 6 HOURS PRN
Status: DISCONTINUED | OUTPATIENT
Start: 2017-07-24 | End: 2017-07-24

## 2017-07-24 RX ORDER — ROCURONIUM BROMIDE 10 MG/ML
INJECTION, SOLUTION INTRAVENOUS
Status: DISCONTINUED | OUTPATIENT
Start: 2017-07-24 | End: 2017-07-24

## 2017-07-24 RX ORDER — ACETAMINOPHEN 10 MG/ML
1000 INJECTION, SOLUTION INTRAVENOUS EVERY 8 HOURS
Status: DISCONTINUED | OUTPATIENT
Start: 2017-07-24 | End: 2017-07-25 | Stop reason: HOSPADM

## 2017-07-24 RX ORDER — ONDANSETRON 2 MG/ML
4 INJECTION INTRAMUSCULAR; INTRAVENOUS DAILY PRN
Status: DISCONTINUED | OUTPATIENT
Start: 2017-07-24 | End: 2017-07-24

## 2017-07-24 RX ORDER — POTASSIUM CHLORIDE 20 MEQ/15ML
60 SOLUTION ORAL ONCE
Status: COMPLETED | OUTPATIENT
Start: 2017-07-24 | End: 2017-07-24

## 2017-07-24 RX ORDER — SODIUM CHLORIDE 0.9 % (FLUSH) 0.9 %
3 SYRINGE (ML) INJECTION
Status: DISCONTINUED | OUTPATIENT
Start: 2017-07-24 | End: 2017-07-24

## 2017-07-24 RX ORDER — NALOXONE HCL 0.4 MG/ML
VIAL (ML) INJECTION
Status: COMPLETED
Start: 2017-07-24 | End: 2017-07-24

## 2017-07-24 RX ORDER — ONDANSETRON HYDROCHLORIDE 2 MG/ML
INJECTION, SOLUTION INTRAMUSCULAR; INTRAVENOUS
Status: DISCONTINUED | OUTPATIENT
Start: 2017-07-24 | End: 2017-07-24

## 2017-07-24 RX ORDER — SODIUM CHLORIDE 9 MG/ML
INJECTION, SOLUTION INTRAVENOUS CONTINUOUS PRN
Status: DISCONTINUED | OUTPATIENT
Start: 2017-07-24 | End: 2017-07-24

## 2017-07-24 RX ORDER — HYDROMORPHONE HYDROCHLORIDE 1 MG/ML
0.2 INJECTION, SOLUTION INTRAMUSCULAR; INTRAVENOUS; SUBCUTANEOUS EVERY 5 MIN PRN
Status: DISCONTINUED | OUTPATIENT
Start: 2017-07-24 | End: 2017-07-24

## 2017-07-24 RX ORDER — HYDROMORPHONE HYDROCHLORIDE 1 MG/ML
0.2 INJECTION, SOLUTION INTRAMUSCULAR; INTRAVENOUS; SUBCUTANEOUS EVERY 5 MIN PRN
Status: DISCONTINUED | OUTPATIENT
Start: 2017-07-24 | End: 2017-07-24 | Stop reason: HOSPADM

## 2017-07-24 RX ORDER — FENTANYL CITRATE 50 UG/ML
INJECTION, SOLUTION INTRAMUSCULAR; INTRAVENOUS
Status: DISCONTINUED | OUTPATIENT
Start: 2017-07-24 | End: 2017-07-24

## 2017-07-24 RX ORDER — HYDROMORPHONE HYDROCHLORIDE 1 MG/ML
0.2 INJECTION, SOLUTION INTRAMUSCULAR; INTRAVENOUS; SUBCUTANEOUS EVERY 5 MIN PRN
Status: COMPLETED | OUTPATIENT
Start: 2017-07-24 | End: 2017-07-24

## 2017-07-24 RX ORDER — HYDROMORPHONE HYDROCHLORIDE 1 MG/ML
1 INJECTION, SOLUTION INTRAMUSCULAR; INTRAVENOUS; SUBCUTANEOUS
Status: DISCONTINUED | OUTPATIENT
Start: 2017-07-24 | End: 2017-07-24

## 2017-07-24 RX ORDER — OXYCODONE AND ACETAMINOPHEN 10; 325 MG/1; MG/1
1 TABLET ORAL EVERY 4 HOURS PRN
Status: DISCONTINUED | OUTPATIENT
Start: 2017-07-24 | End: 2017-07-24

## 2017-07-24 RX ORDER — FENTANYL CITRATE 50 UG/ML
25 INJECTION, SOLUTION INTRAMUSCULAR; INTRAVENOUS EVERY 5 MIN PRN
Status: DISCONTINUED | OUTPATIENT
Start: 2017-07-24 | End: 2017-07-24

## 2017-07-24 RX ORDER — IBUPROFEN 400 MG/1
400 TABLET ORAL EVERY 6 HOURS PRN
Status: DISCONTINUED | OUTPATIENT
Start: 2017-07-24 | End: 2017-07-25 | Stop reason: HOSPADM

## 2017-07-24 RX ADMIN — HYDROMORPHONE HYDROCHLORIDE 0.2 MG: 1 INJECTION, SOLUTION INTRAMUSCULAR; INTRAVENOUS; SUBCUTANEOUS at 05:07

## 2017-07-24 RX ADMIN — HYDROMORPHONE HYDROCHLORIDE 0.2 MG: 1 INJECTION, SOLUTION INTRAMUSCULAR; INTRAVENOUS; SUBCUTANEOUS at 04:07

## 2017-07-24 RX ADMIN — ROCURONIUM BROMIDE 10 MG: 10 INJECTION, SOLUTION INTRAVENOUS at 01:07

## 2017-07-24 RX ADMIN — SODIUM CHLORIDE, SODIUM GLUCONATE, SODIUM ACETATE, POTASSIUM CHLORIDE, MAGNESIUM CHLORIDE, SODIUM PHOSPHATE, DIBASIC, AND POTASSIUM PHOSPHATE: .53; .5; .37; .037; .03; .012; .00082 INJECTION, SOLUTION INTRAVENOUS at 02:07

## 2017-07-24 RX ADMIN — HYDROMORPHONE HYDROCHLORIDE 0.2 MG: 1 INJECTION, SOLUTION INTRAMUSCULAR; INTRAVENOUS; SUBCUTANEOUS at 03:07

## 2017-07-24 RX ADMIN — BENAZEPRIL HYDROCHLORIDE 20 MG: 20 TABLET, FILM COATED ORAL at 08:07

## 2017-07-24 RX ADMIN — METRONIDAZOLE 500 MG: 500 INJECTION, SOLUTION INTRAVENOUS at 10:07

## 2017-07-24 RX ADMIN — PROPOFOL 170 MG: 10 INJECTION, EMULSION INTRAVENOUS at 01:07

## 2017-07-24 RX ADMIN — OXYCODONE HYDROCHLORIDE AND ACETAMINOPHEN 1 TABLET: 10; 325 TABLET ORAL at 03:07

## 2017-07-24 RX ADMIN — ONDANSETRON 4 MG: 2 INJECTION INTRAMUSCULAR; INTRAVENOUS at 06:07

## 2017-07-24 RX ADMIN — IBUPROFEN 400 MG: 400 TABLET, FILM COATED ORAL at 07:07

## 2017-07-24 RX ADMIN — SODIUM CHLORIDE: 0.9 INJECTION, SOLUTION INTRAVENOUS at 11:07

## 2017-07-24 RX ADMIN — ONDANSETRON 4 MG: 2 INJECTION, SOLUTION INTRAMUSCULAR; INTRAVENOUS at 03:07

## 2017-07-24 RX ADMIN — PHENYLEPHRINE HYDROCHLORIDE 100 MCG: 10 INJECTION INTRAVENOUS at 01:07

## 2017-07-24 RX ADMIN — HYDROCHLOROTHIAZIDE 25 MG: 25 TABLET ORAL at 08:07

## 2017-07-24 RX ADMIN — NEOSTIGMINE METHYLSULFATE 5 MG: 1 INJECTION INTRAVENOUS at 03:07

## 2017-07-24 RX ADMIN — LIDOCAINE HYDROCHLORIDE 100 MG: 20 INJECTION, SOLUTION INTRAVENOUS at 01:07

## 2017-07-24 RX ADMIN — METRONIDAZOLE 500 MG: 500 INJECTION, SOLUTION INTRAVENOUS at 03:07

## 2017-07-24 RX ADMIN — ROCURONIUM BROMIDE 5 MG: 10 INJECTION, SOLUTION INTRAVENOUS at 02:07

## 2017-07-24 RX ADMIN — EPHEDRINE SULFATE 10 MG: 50 INJECTION, SOLUTION INTRAMUSCULAR; INTRAVENOUS; SUBCUTANEOUS at 02:07

## 2017-07-24 RX ADMIN — CIPROFLOXACIN 400 MG: 2 INJECTION, SOLUTION INTRAVENOUS at 05:07

## 2017-07-24 RX ADMIN — HYDROMORPHONE HYDROCHLORIDE 1 MG: 1 INJECTION, SOLUTION INTRAMUSCULAR; INTRAVENOUS; SUBCUTANEOUS at 06:07

## 2017-07-24 RX ADMIN — PHENYLEPHRINE HYDROCHLORIDE 200 MCG: 10 INJECTION INTRAVENOUS at 02:07

## 2017-07-24 RX ADMIN — ACETAMINOPHEN 1000 MG: 10 INJECTION, SOLUTION INTRAVENOUS at 09:07

## 2017-07-24 RX ADMIN — CEFAZOLIN SODIUM 2 G: 2 SOLUTION INTRAVENOUS at 01:07

## 2017-07-24 RX ADMIN — EPHEDRINE SULFATE 5 MG: 50 INJECTION, SOLUTION INTRAMUSCULAR; INTRAVENOUS; SUBCUTANEOUS at 02:07

## 2017-07-24 RX ADMIN — ACETAMINOPHEN 1000 MG: 10 INJECTION, SOLUTION INTRAVENOUS at 03:07

## 2017-07-24 RX ADMIN — Medication 30 MG: at 02:07

## 2017-07-24 RX ADMIN — FENTANYL CITRATE 150 MCG: 50 INJECTION, SOLUTION INTRAMUSCULAR; INTRAVENOUS at 01:07

## 2017-07-24 RX ADMIN — CIPROFLOXACIN 400 MG: 2 INJECTION, SOLUTION INTRAVENOUS at 08:07

## 2017-07-24 RX ADMIN — FENTANYL CITRATE 50 MCG: 50 INJECTION, SOLUTION INTRAMUSCULAR; INTRAVENOUS at 03:07

## 2017-07-24 RX ADMIN — GLYCOPYRROLATE 0.6 MG: 0.2 INJECTION, SOLUTION INTRAMUSCULAR; INTRAVENOUS at 03:07

## 2017-07-24 RX ADMIN — ROCURONIUM BROMIDE 40 MG: 10 INJECTION, SOLUTION INTRAVENOUS at 01:07

## 2017-07-24 RX ADMIN — NALOXONE HYDROCHLORIDE: 0.4 INJECTION, SOLUTION INTRAMUSCULAR; INTRAVENOUS; SUBCUTANEOUS at 06:07

## 2017-07-24 RX ADMIN — POTASSIUM CHLORIDE 60 MEQ: 20 SOLUTION ORAL at 11:07

## 2017-07-24 RX ADMIN — BUPIVACAINE HYDROCHLORIDE 15 ML: 2.5 INJECTION, SOLUTION EPIDURAL; INFILTRATION; INTRACAUDAL; PERINEURAL at 03:07

## 2017-07-24 NOTE — ASSESSMENT & PLAN NOTE
- as per MRCP results above  - General surgery following - scheduled for cyst drainage today  - cont IV cipro and flagyl at this time, plan to d/c abx based on cyst cx results  - orders for cyst fluid gram stain, cytology and cx are placed

## 2017-07-24 NOTE — NURSING TRANSFER
Nursing Transfer Note      7/24/2017     Transfer To: 929    Transfer via stretcher    Transfer with n/a    Transported by pct    Medicines sent: n/a    Chart send with patient: Yes    Notified: daughter

## 2017-07-24 NOTE — PLAN OF CARE
Problem: Fall Risk (Adult)  Goal: Absence of Falls  Patient will demonstrate the desired outcomes by discharge/transition of care.   Outcome: Ongoing (interventions implemented as appropriate)  Patient is alert and oriented x 4.  Patient is able to ambulate to the bathroom ad [place], and has remained free from falling throughout shift.

## 2017-07-24 NOTE — CONSULTS
[facility]  Hepatology  Consult Note    Patient Name: Pauline Rodriguez  MRN: [id number]  Admission Date: 7/20/2017  Hospital Length of Stay: 4 days  Attending Provider: Liban Cummins MD   Primary Care Physician: Charlotte Owens MD  Principal Problem:Biloma    Consults  Subjective:     Transplant status: No    HPI:  Mrs. Rodriguez is a 72 yo W with a PMH of HTN and glaucoma who presented to OSH ED for RUQ pain. RUQ pain appeared colicky in nature so gallbladder disease was suspected. U/S of RUQ done which showed large slightly complex cyst appearance about the right kidney, but no evidence of gallbladder disease. CT scan of abdomen was done to better visualize cystic lesion. CT scan showed a 12.4 x 9.1 cm hypodensity arising from the inferior liver adjacent to the gallbladder fossa with distortion of the adjacent gallbladder. Patient also with several additional smaller hepatic cysts noted. From CT , biloma could not be ruled out. Dr. Mcgrath from Hepatology was called and recommended transfer to [facility] for Hepatology evaluation. Patient given IV Zosyn x 1 dose and blood cultures done.      Interval History:  MRCP done. No connection.   Continues to have intermittent abdominal pain.   To OR today with General Surgery.     Review of Systems    Past Medical History:   Diagnosis Date    Essential hypertension        Past Surgical History:   Procedure Laterality Date    HYSTERECTOMY         Family history of liver disease: No    Review of patient's allergies indicates:  No Known Allergies    Social History Main Topics    Smoking status: Never Smoker    Smokeless tobacco: Never Used    Alcohol use No    Drug use: Unknown    Sexual activity: Not on file       Prescriptions Prior to Admission   Medication Sig Dispense Refill Last Dose    benazepril (LOTENSIN) 20 MG tablet Take 1 tablet (20 mg total) by mouth once daily. 90 tablet 3 7/20/2017 at morning    hydrochlorothiazide  (HYDRODIURIL) 25 MG tablet Take 1 tablet (25 mg total) by mouth once daily. 90 tablet 3 7/20/2017 at morning    timolol (BETIMOL) 0.5 % ophthalmic solution Place 1 drop into both eyes 2 (two) times daily.     7/20/2017 at morning    travoprost, benzalkonium, (TRAVATAN) 0.004 % ophthalmic solution Place 1 drop into both eyes every evening.    7/19/2017 at evening       Objective:     Vital Signs (Most Recent):  Temp: 97.8 °F (36.6 °C) (07/24/17 0815)  Pulse: 65 (07/24/17 0815)  Resp: 18 (07/24/17 0815)  BP: 139/77 (07/24/17 0815)  SpO2: 96 % (07/24/17 0815) Vital Signs (24h Range):  Temp:  [96.3 °F (35.7 °C)-98.4 °F (36.9 °C)] 97.8 °F (36.6 °C)  Pulse:  [62-73] 65  Resp:  [18-19] 18  SpO2:  [92 %-96 %] 96 %  BP: (129-141)/(61-81) 139/77     Weight: 80.3 kg (177 lb 1.6 oz) (07/20/17 2137)  Body mass index is 31.37 kg/m².    Physical Exam   Constitutional: She is oriented to person, place, and time. She appears well-developed and well-nourished.   HENT:   Head: Normocephalic and atraumatic.   Eyes: Conjunctivae are normal. No scleral icterus.   Neck: Neck supple.   Cardiovascular: Normal rate and regular rhythm.    No murmur heard.  Pulmonary/Chest: Effort normal. No stridor. No respiratory distress. She has no wheezes. She has no rales.   Abdominal: Soft. Bowel sounds are normal. She exhibits no distension and no mass. There is no tenderness. There is no rebound and no guarding. No hernia.   Musculoskeletal: She exhibits no edema or tenderness.   Neurological: She is alert and oriented to person, place, and time.   Skin: Skin is warm and dry.   Psychiatric: She has a normal mood and affect.   Vitals reviewed.    MELD-Na score: 8 at 7/24/2017  4:31 AM  MELD score: 8 at 7/24/2017  4:31 AM  Calculated from:  Serum Creatinine: 0.9 mg/dL (Rounded to 1) at 7/24/2017  4:31 AM  Serum Sodium: 141 mmol/L (Rounded to 137) at 7/24/2017  4:31 AM  Total Bilirubin: 1.4 mg/dL at 7/24/2017  4:31 AM  INR(ratio): 1.0 at 7/24/2017   4:31 AM  Age: 71 years    Significant Labs:    Recent Results (from the past 24 hour(s))   Comprehensive metabolic panel    Collection Time: 07/24/17  4:31 AM   Result Value Ref Range    Sodium 141 136 - 145 mmol/L    Potassium 2.9 (L) 3.5 - 5.1 mmol/L    Chloride 103 95 - 110 mmol/L    CO2 26 23 - 29 mmol/L    Glucose 97 70 - 110 mg/dL    BUN, Bld 19 8 - 23 mg/dL    Creatinine 0.9 0.5 - 1.4 mg/dL    Calcium 9.2 8.7 - 10.5 mg/dL    Total Protein 6.5 6.0 - 8.4 g/dL    Albumin 3.5 3.5 - 5.2 g/dL    Total Bilirubin 1.4 (H) 0.1 - 1.0 mg/dL    Alkaline Phosphatase 83 55 - 135 U/L    AST 19 10 - 40 U/L    ALT 13 10 - 44 U/L    Anion Gap 12 8 - 16 mmol/L    eGFR if African American >60.0 >60 mL/min/1.73 m^2    eGFR if non African American >60.0 >60 mL/min/1.73 m^2   CBC auto differential    Collection Time: 07/24/17  4:31 AM   Result Value Ref Range    WBC 6.32 3.90 - 12.70 K/uL    RBC 4.13 4.00 - 5.40 M/uL    Hemoglobin 12.0 12.0 - 16.0 g/dL    Hematocrit 36.2 (L) 37.0 - 48.5 %    MCV 88 82 - 98 fL    MCH 29.1 27.0 - 31.0 pg    MCHC 33.1 32.0 - 36.0 g/dL    RDW 13.2 11.5 - 14.5 %    Platelets 236 150 - 350 K/uL    MPV 9.6 9.2 - 12.9 fL    Gran # 3.0 1.8 - 7.7 K/uL    Lymph # 2.1 1.0 - 4.8 K/uL    Mono # 0.8 0.3 - 1.0 K/uL    Eos # 0.4 0.0 - 0.5 K/uL    Baso # 0.01 0.00 - 0.20 K/uL    Gran% 47.5 38.0 - 73.0 %    Lymph% 33.5 18.0 - 48.0 %    Mono% 12.7 4.0 - 15.0 %    Eosinophil% 5.9 0.0 - 8.0 %    Basophil% 0.2 0.0 - 1.9 %    Differential Method Automated    Protime-INR    Collection Time: 07/24/17  4:31 AM   Result Value Ref Range    Prothrombin Time 11.0 9.0 - 12.5 sec    INR 1.0 0.8 - 1.2   Type & Screen    Collection Time: 07/24/17  4:31 AM   Result Value Ref Range    Group & Rh O POS     Indirect Andrea NEG          Significant Imaging:    Significant imaging reviewed and noted in HPI.    Assessment/Plan:     Hepatic cyst     - US, CT, MRCP showed likely simple cyst with no connection to biliary system.   - Continues  to be symptomatic with abdominal pain. No evidence of infection.   - Hepatic enzymes WNL for duration of hospitalization.   - Going to OR with Gen Surgery today for laparoscopic cyst fenestration        Thank you for your consult. I will follow-up with patient. Please contact us if you have any additional questions.    Madhu Mclaughlin MD  Hepatology  [facility]

## 2017-07-24 NOTE — TRANSFER OF CARE
"Anesthesia Transfer of Care Note    Patient: Pauline Rodriguez    Procedure(s) Performed: Procedure(s) (LRB):  LAPAROSCOPIC CYST FENESTRATION  (N/A)    Patient location: PACU    Transport from OR: Transported from OR on 6-10 L/min O2 by face mask with adequate spontaneous ventilation    Post pain: adequate analgesia    Post assessment: no apparent anesthetic complications and tolerated procedure well    Post vital signs: stable    Level of consciousness: awake and confused    Nausea/Vomiting: no nausea/vomiting    Complications: none    Transfer of care protocol was followed      Last vitals:   Visit Vitals  BP (!) 147/67 (BP Location: Left arm, Patient Position: Lying, BP Method: Automatic)   Pulse 80   Temp 36.3 °C (97.3 °F) (Axillary)   Resp 18   Ht 5' 3" (1.6 m)   Wt 80.3 kg (177 lb 1.6 oz)   SpO2 98%   Breastfeeding? No   BMI 31.37 kg/m²     "

## 2017-07-24 NOTE — ANESTHESIA POSTPROCEDURE EVALUATION
"Anesthesia Post Evaluation    Patient: Pauline Rodriguez    Procedure(s) Performed: Procedure(s) (LRB):  LAPAROSCOPIC CYST FENESTRATION  (N/A)    Final Anesthesia Type: general  Patient location during evaluation: PACU  Patient participation: Yes- Able to Participate  Level of consciousness: awake and alert  Post-procedure vital signs: reviewed and stable  Pain management: adequate  Airway patency: patent  PONV status at discharge: No PONV  Anesthetic complications: no      Cardiovascular status: hemodynamically stable  Respiratory status: unassisted, spontaneous ventilation and nasal cannula  Hydration status: euvolemic  Follow-up not needed.        Visit Vitals  /64 (BP Location: Left arm, Patient Position: Lying, BP Method: Automatic)   Pulse 67   Temp 36.4 °C (97.6 °F) (Oral)   Resp 14   Ht 5' 3" (1.6 m)   Wt 80.3 kg (177 lb 1.6 oz)   SpO2 95%   Breastfeeding? No   BMI 31.37 kg/m²       Pain/Jaylen Score: Pain Assessment Performed: Yes (7/24/2017  3:45 PM)  Presence of Pain: complains of pain/discomfort (7/24/2017  3:45 PM)  Pain Rating Prior to Med Admin: 5 (7/24/2017  5:00 PM)  Pain Rating Post Med Admin: 9 (Pt. doesn't want stronger pain med, was going to call the Dr) (7/23/2017  2:15 PM)  Jaylen Score: 10 (7/24/2017  4:45 PM)      "

## 2017-07-24 NOTE — SUBJECTIVE & OBJECTIVE
Review of Systems    Past Medical History:   Diagnosis Date    Essential hypertension        Past Surgical History:   Procedure Laterality Date    HYSTERECTOMY         Family history of liver disease: No    Review of patient's allergies indicates:  No Known Allergies    Social History Main Topics    Smoking status: Never Smoker    Smokeless tobacco: Never Used    Alcohol use No    Drug use: Unknown    Sexual activity: Not on file       Prescriptions Prior to Admission   Medication Sig Dispense Refill Last Dose    benazepril (LOTENSIN) 20 MG tablet Take 1 tablet (20 mg total) by mouth once daily. 90 tablet 3 7/20/2017 at morning    hydrochlorothiazide (HYDRODIURIL) 25 MG tablet Take 1 tablet (25 mg total) by mouth once daily. 90 tablet 3 7/20/2017 at morning    timolol (BETIMOL) 0.5 % ophthalmic solution Place 1 drop into both eyes 2 (two) times daily.     7/20/2017 at morning    travoprost, benzalkonium, (TRAVATAN) 0.004 % ophthalmic solution Place 1 drop into both eyes every evening.    7/19/2017 at evening       Objective:     Vital Signs (Most Recent):  Temp: 97.8 °F (36.6 °C) (07/24/17 0815)  Pulse: 65 (07/24/17 0815)  Resp: 18 (07/24/17 0815)  BP: 139/77 (07/24/17 0815)  SpO2: 96 % (07/24/17 0815) Vital Signs (24h Range):  Temp:  [96.3 °F (35.7 °C)-98.4 °F (36.9 °C)] 97.8 °F (36.6 °C)  Pulse:  [62-73] 65  Resp:  [18-19] 18  SpO2:  [92 %-96 %] 96 %  BP: (129-141)/(61-81) 139/77     Weight: 80.3 kg (177 lb 1.6 oz) (07/20/17 2137)  Body mass index is 31.37 kg/m².    Physical Exam   Constitutional: She is oriented to person, place, and time. She appears well-developed and well-nourished.   HENT:   Head: Normocephalic and atraumatic.   Eyes: Conjunctivae are normal. No scleral icterus.   Neck: Neck supple.   Cardiovascular: Normal rate and regular rhythm.    No murmur heard.  Pulmonary/Chest: Effort normal. No stridor. No respiratory distress. She has no wheezes. She has no rales.   Abdominal: Soft. Bowel  sounds are normal. She exhibits no distension and no mass. There is no tenderness. There is no rebound and no guarding. No hernia.   Musculoskeletal: She exhibits no edema or tenderness.   Neurological: She is alert and oriented to person, place, and time.   Skin: Skin is warm and dry.   Psychiatric: She has a normal mood and affect.   Vitals reviewed.    MELD-Na score: 8 at 7/24/2017  4:31 AM  MELD score: 8 at 7/24/2017  4:31 AM  Calculated from:  Serum Creatinine: 0.9 mg/dL (Rounded to 1) at 7/24/2017  4:31 AM  Serum Sodium: 141 mmol/L (Rounded to 137) at 7/24/2017  4:31 AM  Total Bilirubin: 1.4 mg/dL at 7/24/2017  4:31 AM  INR(ratio): 1.0 at 7/24/2017  4:31 AM  Age: 71 years    Significant Labs:    Recent Results (from the past 24 hour(s))   Comprehensive metabolic panel    Collection Time: 07/24/17  4:31 AM   Result Value Ref Range    Sodium 141 136 - 145 mmol/L    Potassium 2.9 (L) 3.5 - 5.1 mmol/L    Chloride 103 95 - 110 mmol/L    CO2 26 23 - 29 mmol/L    Glucose 97 70 - 110 mg/dL    BUN, Bld 19 8 - 23 mg/dL    Creatinine 0.9 0.5 - 1.4 mg/dL    Calcium 9.2 8.7 - 10.5 mg/dL    Total Protein 6.5 6.0 - 8.4 g/dL    Albumin 3.5 3.5 - 5.2 g/dL    Total Bilirubin 1.4 (H) 0.1 - 1.0 mg/dL    Alkaline Phosphatase 83 55 - 135 U/L    AST 19 10 - 40 U/L    ALT 13 10 - 44 U/L    Anion Gap 12 8 - 16 mmol/L    eGFR if African American >60.0 >60 mL/min/1.73 m^2    eGFR if non African American >60.0 >60 mL/min/1.73 m^2   CBC auto differential    Collection Time: 07/24/17  4:31 AM   Result Value Ref Range    WBC 6.32 3.90 - 12.70 K/uL    RBC 4.13 4.00 - 5.40 M/uL    Hemoglobin 12.0 12.0 - 16.0 g/dL    Hematocrit 36.2 (L) 37.0 - 48.5 %    MCV 88 82 - 98 fL    MCH 29.1 27.0 - 31.0 pg    MCHC 33.1 32.0 - 36.0 g/dL    RDW 13.2 11.5 - 14.5 %    Platelets 236 150 - 350 K/uL    MPV 9.6 9.2 - 12.9 fL    Gran # 3.0 1.8 - 7.7 K/uL    Lymph # 2.1 1.0 - 4.8 K/uL    Mono # 0.8 0.3 - 1.0 K/uL    Eos # 0.4 0.0 - 0.5 K/uL    Baso # 0.01 0.00 -  0.20 K/uL    Gran% 47.5 38.0 - 73.0 %    Lymph% 33.5 18.0 - 48.0 %    Mono% 12.7 4.0 - 15.0 %    Eosinophil% 5.9 0.0 - 8.0 %    Basophil% 0.2 0.0 - 1.9 %    Differential Method Automated    Protime-INR    Collection Time: 07/24/17  4:31 AM   Result Value Ref Range    Prothrombin Time 11.0 9.0 - 12.5 sec    INR 1.0 0.8 - 1.2   Type & Screen    Collection Time: 07/24/17  4:31 AM   Result Value Ref Range    Group & Rh O POS     Indirect Andrea NEG          Significant Imaging:    Significant imaging reviewed and noted in HPI.

## 2017-07-24 NOTE — PHARMACY MED REC
"Admission Medication Reconciliation - Pharmacy Consult Note    The home medication history was taken by Apryl Rahman, Pharmacy Technician.  Based on information gathered and subsequent review by the clinical pharmacist, the items below may need attention.     You may go to "Admission" then "Reconcile Home Medications" tabs to review and/or act upon these items. Based on information gathered and subsequent review by the clinical pharmacist, the items below may need attention.    Potentially problematic discrepancies with current MAR  o Patient IS taking the following which was not ordered upon admit  o Timolol 0.5% opthalmic solution 1 drop to each eye BID  o Travoprost 0.004% opthalmic solution 1 drop to each eye QHS     Potential issues to be addressed PRIOR TO DISCHARGE  o Patient would prefer to have discharge prescriptions delivered to bedside at discharge.    Please address this information as you see fit.  Feel free to contact us if you have any questions or require assistance.    Barbara Aguirre, PharmD  Emergency Medicine Clinical Pharmacist  [phone] (2pm-midnight daily)      Patient's prior to admission medication regimen was as follows:  Prescriptions Prior to Admission   Medication Sig Dispense Refill Last Dose    benazepril (LOTENSIN) 20 MG tablet Take 1 tablet (20 mg total) by mouth once daily. 90 tablet 3 7/20/2017 at morning    hydrochlorothiazide (HYDRODIURIL) 25 MG tablet Take 1 tablet (25 mg total) by mouth once daily. 90 tablet 3 7/20/2017 at morning    timolol (BETIMOL) 0.5 % ophthalmic solution Place 1 drop into both eyes 2 (two) times daily.     7/20/2017 at morning    travoprost, benzalkonium, (TRAVATAN) 0.004 % ophthalmic solution Place 1 drop into both eyes every evening.    7/19/2017 at evening         Please add appropriate    SmartPhrase below:        "

## 2017-07-24 NOTE — PLAN OF CARE
Problem: Patient Care Overview  Goal: Plan of Care Review  Outcome: Ongoing (interventions implemented as appropriate)  Discussed patient's care with the family and her.      Problem: Fall Risk (Adult)  Goal: Identify Related Risk Factors and Signs and Symptoms  Related risk factors and signs and symptoms are identified upon initiation of Human Response Clinical Practice Guideline (CPG)   Outcome: Ongoing (interventions implemented as appropriate)  Patient ambulates steady and has been free from falls.    Problem: Pain, Acute (Adult)  Goal: Identify Related Risk Factors and Signs and Symptoms  Related risk factors and signs and symptoms are identified upon initiation of Human Response Clinical Practice Guideline (CPG)   Outcome: Ongoing (interventions implemented as appropriate)  Patient has been monitored throughout the shift for pain, she received PRN pain medication as ordered, but states it did not help.  I offered to call the physician to get her something else for pain, but she declined.  Will monitor and keep patient comfortable.

## 2017-07-24 NOTE — PROGRESS NOTES
Pt seen and examined. MRI reviewed, simple cyst, no biloma.  +bm, NPO since midnight    Abd soft, distended, tender but no peritonitis     Patient has expressed concern about the risks of the procedure. She was counseled on informed consent, no surgery carries zero risk. She is very concerned about the risk of recurrence. I explained there will always be a risk of recurrence. I told her Dr. Cummins would be by to consent her this morning and answer any more questions she has. She is in good spirits this am and agrees with the plan.     To OR for laparoscopic cyst fenestration.    Minda Cowan PGY3  [phone]

## 2017-07-24 NOTE — PROGRESS NOTES
[facility] Hospital Medicine  Progress Note    Patient Name: Pauline Rodriguez  MRN: [id number]  Patient Class: IP- Inpatient   Admission Date: 7/20/2017  Length of Stay: 4 days  Attending Physician: Liban Cummins MD  Primary Care Provider: Charlotte Owens MD    Hospital Medicine Team: [facility] HOSP MED 1 Rafaela Kapadia MD    Subjective:     Principal Problem:Biloma    HPI:  Report from Transferring Physician or Mid-Level provider/Hospital course: 72 y/o female with past medical history of essential HTN and glaucoma and prior hysterectomy presented to [facility] ER this am with 1 day history of RUQ pain and associated nausea. RUQ pain appeared colicky in nature so suspected gallbladder disease so US of RUQ done that showed large slightly complex cyst appearance about the right kidney but no evidence of gallbladder disease so CT scan of abdomen was done to better visualize cystic lesion and CT scan showed a 12.4 x 9.1 cm biloma arising from the inferior liver adjacent to the gallbladder fossa with distortion of the adjacent gallbladder. Patient also with several additional smaller liver cysts also noted. Dr. Mcgrath from Hepatology was called and recommended transfer to [facility] for Hepatology evaluation of large biloma. Patient given IV Zosyn x 1 dose and blood cultures done. Patient patient is afebrile with normal WBC but Dr. Mcgrath had recommended Zosyn and cultures according to Dr. Mcrae.     Patient states that pain is exacerbated by movement and breathing and is also positional, worse when lying flat. Pain was acute and sudden in onset. Patient states that she noticed that she was short of breath for the last couple weeks and recalls no other symptoms prior to this episode.Patient also endorses nausea and dry heaving but no emesis. Patient denies any recent travel history. Patient denies and change in bowel habits, diarrhea, dysuria, fever, weight loss.     Hospital Course:  7/21:  Admitted to IM1. Received dose of zosyn prior to transfer and continued on cipro and flagyl. NPO for now pending Hepatology evaluation of biloma and possible EUS ( Dr. Saldivar from hepatology aware of patient's transfer ). Hepatology consulted and reviewed imaging with radiology and AES. Gallbladder perforation cannot be completely ruled out at this time. General surgery consulted to evaluate for possible perforation.  7/22: MRCP showed 10 cm liver cyst near the gallbladder. gen surg is following. Scheduled for cyst drainage tomorrow am with gen surg    Interval History:     Doing well. No acute events  Feeling well. Surgery today    Review of Systems   Constitutional: Negative for activity change, appetite change, chills and fever.   Respiratory: Negative for cough and shortness of breath.    Cardiovascular: Negative for chest pain and leg swelling.   Gastrointestinal: Positive for abdominal pain. Negative for abdominal distention and vomiting.   Genitourinary: Negative for difficulty urinating and dysuria.   Musculoskeletal: Negative for arthralgias and back pain.   Neurological: Negative for weakness, light-headedness and headaches.   Psychiatric/Behavioral: Negative for confusion.     Objective:     Vital Signs (Most Recent):  Temp: 96.6 °F (35.9 °C) (07/24/17 1152)  Pulse: 66 (07/24/17 1252)  Resp: 16 (07/24/17 1252)  BP: (!) 108/93 (07/24/17 1252)  SpO2: 98 % (07/24/17 1252) Vital Signs (24h Range):  Temp:  [96.3 °F (35.7 °C)-98.4 °F (36.9 °C)] 96.6 °F (35.9 °C)  Pulse:  [62-73] 66  Resp:  [16-19] 16  SpO2:  [92 %-98 %] 98 %  BP: (108-139)/(61-93) 108/93     Weight: 80.3 kg (177 lb 1.6 oz)  Body mass index is 31.37 kg/m².    Intake/Output Summary (Last 24 hours) at 07/24/17 1306  Last data filed at 07/24/17 1115   Gross per 24 hour   Intake              420 ml   Output                0 ml   Net              420 ml      Physical Exam   Constitutional: She is oriented to person, place, and time. She appears  well-developed and well-nourished. No distress.   HENT:   Head: Normocephalic and atraumatic.   Right Ear: External ear normal.   Left Ear: External ear normal.   Eyes: EOM are normal. Pupils are equal, round, and reactive to light.   Neck: Normal range of motion.   Cardiovascular: Normal rate, regular rhythm and normal heart sounds.    Pulmonary/Chest: Effort normal and breath sounds normal.   Abdominal: Soft. She exhibits no distension. There is tenderness. There is no rebound.   TTP in RUQ   Musculoskeletal: Normal range of motion. She exhibits no edema or tenderness.   Neurological: She is alert and oriented to person, place, and time. No cranial nerve deficit.   Skin: Skin is warm and dry. She is not diaphoretic.   Psychiatric: She has a normal mood and affect.       Significant Labs:   CBC:   Recent Labs  Lab 07/23/17  0535 07/24/17  0431   WBC 7.81 6.32   HGB 12.0 12.0   HCT 35.9* 36.2*    236     CMP:   Recent Labs  Lab 07/23/17  0535 07/24/17  0431    141   K 3.4* 2.9*    103   CO2 27 26   GLU 89 97   BUN 16 19   CREATININE 0.8 0.9   CALCIUM 9.2 9.2   PROT 6.5 6.5   ALBUMIN 3.3* 3.5   BILITOT 1.7* 1.4*   ALKPHOS 90 83   AST 20 19   ALT 14 13   ANIONGAP 10 12   EGFRNONAA >60.0 >60.0     Assessment/Plan:      Hepatic cyst    - as per MRCP results above  - General surgery following - scheduled for cyst drainage today  - cont IV cipro and flagyl at this time, plan to d/c abx based on cyst cx results  - orders for cyst fluid gram stain, cytology and cx are placed        Right upper quadrant abdominal pain    -Motrin 200MG PRN q4h  -Oxycodone-acetaminophen 5-325MG PRN q4h          Essential hypertension    -Continue home HCTZ 25 mg daily and Lotensin 20 mg daily  -Will continue to monitor        Hypokalemia    - replete with PO            VTE Risk Mitigation         Ordered     Medium Risk of VTE  Once      07/20/17 2218     Place CARIDAD hose  Until discontinued      07/20/17 2218     Place  sequential compression device  Until discontinued      07/20/17 5434          Transfer patient to general surgery service post surgery . Transfer discussed with Dr Cowan with surgery     Please consider hospital medicine consult in the future if necessary     Rafaela Kapadia MD  Department of Hospital Medicine   [facility]

## 2017-07-24 NOTE — PROGRESS NOTES
Pt left via stretcher and transport to same day surgery. IV pole sent with. Report given to viry. Family went with. All belongings left in room. NAD noted at time of transfer.

## 2017-07-24 NOTE — SUBJECTIVE & OBJECTIVE
Interval History:     Doing well. No acute events  Feeling well. Surgery today    Review of Systems   Constitutional: Negative for activity change, appetite change, chills and fever.   Respiratory: Negative for cough and shortness of breath.    Cardiovascular: Negative for chest pain and leg swelling.   Gastrointestinal: Positive for abdominal pain. Negative for abdominal distention and vomiting.   Genitourinary: Negative for difficulty urinating and dysuria.   Musculoskeletal: Negative for arthralgias and back pain.   Neurological: Negative for weakness, light-headedness and headaches.   Psychiatric/Behavioral: Negative for confusion.     Objective:     Vital Signs (Most Recent):  Temp: 96.6 °F (35.9 °C) (07/24/17 1152)  Pulse: 66 (07/24/17 1252)  Resp: 16 (07/24/17 1252)  BP: (!) 108/93 (07/24/17 1252)  SpO2: 98 % (07/24/17 1252) Vital Signs (24h Range):  Temp:  [96.3 °F (35.7 °C)-98.4 °F (36.9 °C)] 96.6 °F (35.9 °C)  Pulse:  [62-73] 66  Resp:  [16-19] 16  SpO2:  [92 %-98 %] 98 %  BP: (108-139)/(61-93) 108/93     Weight: 80.3 kg (177 lb 1.6 oz)  Body mass index is 31.37 kg/m².    Intake/Output Summary (Last 24 hours) at 07/24/17 1306  Last data filed at 07/24/17 1115   Gross per 24 hour   Intake              420 ml   Output                0 ml   Net              420 ml      Physical Exam   Constitutional: She is oriented to person, place, and time. She appears well-developed and well-nourished. No distress.   HENT:   Head: Normocephalic and atraumatic.   Right Ear: External ear normal.   Left Ear: External ear normal.   Eyes: EOM are normal. Pupils are equal, round, and reactive to light.   Neck: Normal range of motion.   Cardiovascular: Normal rate, regular rhythm and normal heart sounds.    Pulmonary/Chest: Effort normal and breath sounds normal.   Abdominal: Soft. She exhibits no distension. There is tenderness. There is no rebound.   TTP in RUQ   Musculoskeletal: Normal range of motion. She exhibits no edema  or tenderness.   Neurological: She is alert and oriented to person, place, and time. No cranial nerve deficit.   Skin: Skin is warm and dry. She is not diaphoretic.   Psychiatric: She has a normal mood and affect.       Significant Labs:   CBC:   Recent Labs  Lab 07/23/17  0535 07/24/17  0431   WBC 7.81 6.32   HGB 12.0 12.0   HCT 35.9* 36.2*    236     CMP:   Recent Labs  Lab 07/23/17  0535 07/24/17  0431    141   K 3.4* 2.9*    103   CO2 27 26   GLU 89 97   BUN 16 19   CREATININE 0.8 0.9   CALCIUM 9.2 9.2   PROT 6.5 6.5   ALBUMIN 3.3* 3.5   BILITOT 1.7* 1.4*   ALKPHOS 90 83   AST 20 19   ALT 14 13   ANIONGAP 10 12   EGFRNONAA >60.0 >60.0

## 2017-07-24 NOTE — PLAN OF CARE
PT TO OR TODAY     07/24/17 1140   Discharge Reassessment   Assessment Type Discharge Planning Reassessment   Can the patient answer the patient profile reliably? Yes, cognitively intact

## 2017-07-24 NOTE — BRIEF OP NOTE
[facility]  Brief Operative Note    SUMMARY     Surgery Date: 7/24/2017     Surgeon(s) and Role:     * Minda Cowan MD - Resident - Assisting     * Liban Cummins MD - Primary        Pre-op Diagnosis:  Hepatic cyst [K76.89]    Post-op Diagnosis:  Post-Op Diagnosis Codes:     * Hepatic cyst [K76.89]    Procedure(s) (LRB):  LAPAROSCOPIC CYST FENESTRATION  (N/A)    Anesthesia: General    Description of Procedure: Large simple cyst to the left of gallbladder. Anterior wall removed. Clear fluid content.    Description of the findings of the procedure: as above    Estimated Blood Loss: 15cc         Specimens:   Specimen (12h ago through future)    Start     Ordered    07/24/17 1503  Specimen to Pathology - Surgery  Once     Comments:  1. Hepatic cyst wall- perm      07/24/17 1503

## 2017-07-24 NOTE — OP NOTE
[facility]  Surgery Department  Operative Note       Date of Procedure: 7/24/2017     Procedure: Procedure(s) (LRB):  LAPAROSCOPIC CYST FENESTRATION  (N/A)     Surgeon(s) and Role:     * Minda Cowan MD - Resident - Assisting     * Liban Cummins MD - Primary    Pre-Operative Diagnosis:   1. RUQ pain  2. Simple Hepatic Cyst    Post-Operative Diagnosis:     Anesthesia: General    Operative Findings:   1. Large simple cyst   2. Grossly normal gallbladder    Procedures:  1.   Laparoscopic fenestration of hepatic cyst  2.   Intraoperative liver ultrasound with recording of images.    Estimated Blood Loss (EBL):  15 mL           Indications:  Pauline Rodriguez presents for the above procedures.  Risks and benefits were reviewed including bleeding, infection, damage to local structures, bile leak, need for additional procedures, death, and imponderables.  She understands and gave informed consent to proceed.    Details:  The patient was transported to the operating room and satisfactory anesthesia established.  Preoperative antibiotics were administered.  The patient was placed in the supine position and extremities were padded and protected throughout.  A patton catheter was placed.      The operative field was prepped and draped in sterile fashion.  A timeout was performed.  The peritoneal cavity was entered under direct vision via an open, infraumbilical cutdown on the fascia.  The peritoneum was breached sharply under direct vision.  A free intraperitoneal space was identified and a balloon tipped Melisa trocar introduced.  The abdomen was insufflated with CO2 to a pressure of 15 mm Hg without difficulty.  The laparoscope was introduced and revealed no complications of entry.  She had some brief bradycardia that responded to desufflation and intravenous volume and was stable at a pressure of 12.    The gallbladder was identified, it was grossly normal and separate from the cyst.  There were  some omental adhesions between the transverse colon and the cyst wall, which were taken down sharply.  This was a thin walled cyst which was rather tense but looked entirely benign.  The laparoscopic ultrasound probe was used to survey the the liver.  The parenchymal echotexture was normal.  A anechoic cyst was noted along with a few smaller ones, all correlating with the CT.  The cyst was entered sharply and clear fluid was aspirated.  The cyst wall was amputated at it's junction with the hepatic parenchyma with the Ligasure.  The entire anterior wall was excised and submitted for pathology.  The interior was inspected and was without masses or bile leakage.  The operative bed was irrigated clean and hemostasis ensured.  Ports were removed.  The access port was closed with interrupted figure-of-8 0-Vicryl.  Skin was closed with 4-0 Monocryl and Steri-strips.    The patient was extubated and taken to the PACU in stable condition.      All needle, lap, and sponge counts were reported as correct.  I communicated the intraoperative findings with the family following the procedure.     Implants: * No implants in log *    Specimens:   Specimen (12h ago through future)    Start     Ordered    07/24/17 1503  Specimen to Pathology - Surgery  Once     Comments:  1. Hepatic cyst wall- perm      07/24/17 1503                  Condition: Stable    Disposition: PACU - hemodynamically stable.    Attestation: I was present and scrubbed for the key portions of the procedure.

## 2017-07-25 VITALS
WEIGHT: 177.13 LBS | RESPIRATION RATE: 16 BRPM | TEMPERATURE: 98 F | SYSTOLIC BLOOD PRESSURE: 125 MMHG | HEIGHT: 63 IN | OXYGEN SATURATION: 95 % | BODY MASS INDEX: 31.38 KG/M2 | DIASTOLIC BLOOD PRESSURE: 71 MMHG | HEART RATE: 70 BPM

## 2017-07-25 LAB
ALBUMIN SERPL BCP-MCNC: 2.9 G/DL
ALBUMIN SERPL BCP-MCNC: 2.9 G/DL
ALP SERPL-CCNC: 72 U/L
ALP SERPL-CCNC: 72 U/L
ALT SERPL W/O P-5'-P-CCNC: 12 U/L
ALT SERPL W/O P-5'-P-CCNC: 12 U/L
ANION GAP SERPL CALC-SCNC: 8 MMOL/L
ANION GAP SERPL CALC-SCNC: 8 MMOL/L
AST SERPL-CCNC: 25 U/L
AST SERPL-CCNC: 25 U/L
BILIRUB SERPL-MCNC: 1 MG/DL
BILIRUB SERPL-MCNC: 1 MG/DL
BUN SERPL-MCNC: 12 MG/DL
BUN SERPL-MCNC: 12 MG/DL
CALCIUM SERPL-MCNC: 8.6 MG/DL
CALCIUM SERPL-MCNC: 8.6 MG/DL
CHLORIDE SERPL-SCNC: 105 MMOL/L
CHLORIDE SERPL-SCNC: 105 MMOL/L
CO2 SERPL-SCNC: 25 MMOL/L
CO2 SERPL-SCNC: 25 MMOL/L
CREAT SERPL-MCNC: 0.7 MG/DL
CREAT SERPL-MCNC: 0.7 MG/DL
EST. GFR  (AFRICAN AMERICAN): >60 ML/MIN/1.73 M^2
EST. GFR  (AFRICAN AMERICAN): >60 ML/MIN/1.73 M^2
EST. GFR  (NON AFRICAN AMERICAN): >60 ML/MIN/1.73 M^2
EST. GFR  (NON AFRICAN AMERICAN): >60 ML/MIN/1.73 M^2
GLUCOSE SERPL-MCNC: 106 MG/DL
GLUCOSE SERPL-MCNC: 106 MG/DL
MAGNESIUM SERPL-MCNC: 1.8 MG/DL
PHOSPHATE SERPL-MCNC: 3.3 MG/DL
POTASSIUM SERPL-SCNC: 3.7 MMOL/L
POTASSIUM SERPL-SCNC: 3.7 MMOL/L
PROT SERPL-MCNC: 5.8 G/DL
PROT SERPL-MCNC: 5.8 G/DL
SODIUM SERPL-SCNC: 138 MMOL/L
SODIUM SERPL-SCNC: 138 MMOL/L

## 2017-07-25 PROCEDURE — 97165 OT EVAL LOW COMPLEX 30 MIN: CPT

## 2017-07-25 PROCEDURE — 63600175 PHARM REV CODE 636 W HCPCS: Performed by: SURGERY

## 2017-07-25 PROCEDURE — 25000003 PHARM REV CODE 250: Performed by: HOSPITALIST

## 2017-07-25 PROCEDURE — 36415 COLL VENOUS BLD VENIPUNCTURE: CPT

## 2017-07-25 PROCEDURE — S0030 INJECTION, METRONIDAZOLE: HCPCS | Performed by: HOSPITALIST

## 2017-07-25 PROCEDURE — 25000003 PHARM REV CODE 250: Performed by: STUDENT IN AN ORGANIZED HEALTH CARE EDUCATION/TRAINING PROGRAM

## 2017-07-25 PROCEDURE — 83735 ASSAY OF MAGNESIUM: CPT

## 2017-07-25 PROCEDURE — 84100 ASSAY OF PHOSPHORUS: CPT

## 2017-07-25 PROCEDURE — 97161 PT EVAL LOW COMPLEX 20 MIN: CPT

## 2017-07-25 PROCEDURE — 80053 COMPREHEN METABOLIC PANEL: CPT

## 2017-07-25 RX ORDER — OXYCODONE AND ACETAMINOPHEN 5; 325 MG/1; MG/1
1 TABLET ORAL EVERY 4 HOURS PRN
Qty: 41 TABLET | Refills: 0 | Status: SHIPPED | OUTPATIENT
Start: 2017-07-25 | End: 2019-05-16

## 2017-07-25 RX ORDER — OXYCODONE AND ACETAMINOPHEN 5; 325 MG/1; MG/1
1 TABLET ORAL EVERY 4 HOURS PRN
Status: DISCONTINUED | OUTPATIENT
Start: 2017-07-25 | End: 2017-07-25 | Stop reason: HOSPADM

## 2017-07-25 RX ORDER — OXYCODONE AND ACETAMINOPHEN 5; 325 MG/1; MG/1
1 TABLET ORAL EVERY 4 HOURS PRN
Qty: 41 TABLET | Refills: 0 | Status: SHIPPED | OUTPATIENT
Start: 2017-07-25 | End: 2017-07-25

## 2017-07-25 RX ADMIN — IBUPROFEN 400 MG: 400 TABLET, FILM COATED ORAL at 01:07

## 2017-07-25 RX ADMIN — HYDROCHLOROTHIAZIDE 25 MG: 25 TABLET ORAL at 09:07

## 2017-07-25 RX ADMIN — IBUPROFEN 400 MG: 400 TABLET, FILM COATED ORAL at 09:07

## 2017-07-25 RX ADMIN — BENAZEPRIL HYDROCHLORIDE 20 MG: 20 TABLET, FILM COATED ORAL at 09:07

## 2017-07-25 RX ADMIN — ACETAMINOPHEN 1000 MG: 10 INJECTION, SOLUTION INTRAVENOUS at 05:07

## 2017-07-25 RX ADMIN — METRONIDAZOLE 500 MG: 500 INJECTION, SOLUTION INTRAVENOUS at 05:07

## 2017-07-25 NOTE — PT/OT/SLP EVAL
"Occupational Therapy  Evaluation/ Discharge Summary    Pauline Rodriguez   MRN: [id number]   Admitting Diagnosis: Hepatic cyst    OT Date of Treatment: 07/25/17   OT Start Time: 0747  OT Stop Time: 0757  OT Total Time (min): 10 min    Billable Minutes:  Evaluation 10 minutes    Diagnosis: Hepatic cyst     Past Medical History:   Diagnosis Date    Essential hypertension       Past Surgical History:   Procedure Laterality Date    HYSTERECTOMY         Referring physician: DAVID Cummins  Date referred to OT: 7/25/2017    General Precautions: Standard,  (n/a)    Do you have any cultural, spiritual, Jain conflicts, given your current situation?: none stated     Patient History:  Living Environment  Lives With: alone  Living Arrangements: house  Transportation Available: family or friend will provide  Living Environment Comment: Pt lives alone in a 1SH with no JUANITO, has a tub and walk-in shower, and regular toilet. Pt was (I) PTA, driving and is (I) in shopping and household management.  Equipment Currently Used at Home: none    Prior level of function:   Bed Mobility/Transfers: independent  Grooming: independent  Bathing: independent  Upper Body Dressing: independent  Lower Body Dressing: independent  Toileting: independent  Home Management Skills: independent  Homemaking Responsibilities: Yes  Mode of Transportation: Car, Family     Dominant hand: right    Subjective:  Communicated with RN prior to session.  "I'm in the closet!"  - pt found standing by the closet upon entry.  Chief Complaint: minimal abdominal pain  Patient/Family stated goals: get better and go home    Pain/Comfort  Pain Rating 1: 2/10  Location - Side 1: Right  Location - Orientation 1: lower  Location 1: abdomen  Pain Addressed 1: Pre-medicate for activity, Cessation of Activity  Pain Rating Post-Intervention 1: 2/10    Objective:   Pt found standing near doorway upon entry and agreeable to coeval with OT/PT    Cognitive Exam:  Oriented to: Person, Place, " Time and Situation  Follows Commands/attention: Follows multistep  commands  Communication: clear/fluent  Memory:  No Deficits noted  Safety awareness/insight to disability: intact  Coping skills/emotional control: Appropriate to situation    Visual/perceptual:  Intact    Physical Exam:  Postural examination/scapula alignment: No postural abnormalities identified  Skin integrity: Visible skin intact  Edema: None noted     Sensation:   Intact    Upper Extremity Range of Motion:  Right Upper Extremity: WNL  Left Upper Extremity: WNL    Upper Extremity Strength:  Right Upper Extremity: WNL  Left Upper Extremity: WNL   Strength: Good    Fine motor coordination:   Intact    Gross motor coordination: WFL    Functional Mobility:  Bed Mobility:  Rolling/Turning to Left: Independent  Rolling/Turning Right: Independent  Scooting/Bridging: Independent  Supine to Sit: Modified Independent  Sit to Supine: Modified Independent    Transfers:  Sit <> Stand Assistance: Independent  Sit <> Stand Assistive Device: No Assistive Device  Bed <> Chair Technique: Stand Pivot  Bed <> Chair Transfer Assistance: Independent  Bed <> Chair Assistive Device: No Assistive Device  Toilet Transfer Technique: Stand Pivot  Toilet Transfer Assistance: Independent  Toilet Transfer Assistive Device: No Assistive Device    Functional Ambulation: (I)    Activities of Daily Living:  Feeding Level of Assistance: Independent  UE Dressing Level of Assistance: Independent  LE Dressing Level of Assistance: Independent  Toileting Where Assessed: Toilet  Toileting Level of Assistance: Independent    Balance:   Static Sit: NORMAL: No deviations seen in posture held statically  Dynamic Sit: NORMAL: No deviations seen in posture held dynamically  Static Stand: GOOD+: Takes MAXIMAL challenges from all directions  Dynamic stand: GOOD+: Independent gait (with or without assistive device)    Therapeutic Activities and Exercises:  Pt ed re OT role and POC. Pt  "performed transfers, mobility, dressing and toileting tasks with (I).    AM-PAC 6 CLICK ADL  How much help from another person does this patient currently need?  1 = Unable, Total/Dependent Assistance  2 = A lot, Maximum/Moderate Assistance  3 = A little, Minimum/Contact Guard/Supervision  4 = None, Modified [place]/Independent    Putting on and taking off regular lower body clothing? : 4  Bathing (including washing, rinsing, drying)?: 4  Toileting, which includes using toilet, bedpan, or urinal? : 4  Putting on and taking off regular upper body clothing?: 4  Taking care of personal grooming such as brushing teeth?: 4  Eating meals?: 4  Total Score: 24    AM-PAC Raw Score CMS "G-Code Modifier Level of Impairment Assistance   6 % Total / Unable   7 - 9 CM 80 - 100% Maximal Assist   10-14 CL 60 - 80% Moderate Assist   15 - 19 CK 40 - 60% Moderate Assist   20 - 22 CJ 20 - 40% Minimal Assist   23 CI 1-20% SBA / CGA   24 CH 0% Independent/ Mod I       Patient left standing in room with daughter and MD present    Assessment:  Pauline Rodriguez is a 71 y.o. female with a medical diagnosis of Hepatic cyst and presents with no limitations in self care or mobility. Pt is able to perform all ADLs and is safe to return home with no needs. OT to d/c.    Pt evaluation falls under low complexity for evaluation coding due to performance deficits noted in 1-3 areas as stated above and 0 co-morbities affecting current functional status. Data obtained from problem focused assessments. No modifications or assistance was required for completion of evaluation. Only brief occupational profile and history review completed.       Rehab identified problem list/impairments: Rehab identified problem list/impairments:  (n/a)    Rehab potential is excellent.    Activity tolerance: Good    Discharge recommendations: Discharge Facility/Level Of Care Needs: home     Barriers to discharge:      Equipment recommendations: none     GOALS:    " Occupational Therapy Goals     Not on file          Multidisciplinary Problems (Resolved)        Problem: Occupational Therapy Goal    Goal Priority Disciplines Outcome Interventions   Occupational Therapy Goal   (Resolved)     OT, PT/OT Outcome(s) achieved                    PLAN:  OT to d/c.    TENISHA Rodriguez  7/25/2017  Pager: [phone]

## 2017-07-25 NOTE — PLAN OF CARE
Problem: Occupational Therapy Goal  Goal: Occupational Therapy Goal  Outcome: Outcome(s) achieved Date Met: 07/25/17  Pt is (I). OT to d/c.    TENISHA Rodriguez  7/25/2017  Pager: [phone]

## 2017-07-25 NOTE — PROGRESS NOTES
Pt was found unresponsive by daughter who was at bed side, shortly after IV dilaudid was administered. Daughter screamed for help down the harris, tech and RN staff nurse found pt was cyanotic and unresponsiveness. Code blue was activated immediately.  Charge nurse in room bagging pt, pt kept a pulse. Code team came to bedside, pt was sternal rubbed, started to become responsive, Narcan given per MD order. VS were taken. Pt was alert to self and situation, unclear of president and unsure of day. Able to respond to questions. Some Nausea noted, zofran ordered per MD order. Pt was monitored and team remained in room for approximately 30 minutes until pt was stable.  IM 1 on call MD Dustin Werner at bedside. Pt to be monitored heavily every 20 min. Oncoming nurse Asa RN notified of code being called and present at bedside for handoff.

## 2017-07-25 NOTE — SUBJECTIVE & OBJECTIVE
Interval History: POD 1 s/p cyst drainage. Pt with episode of decreased responsiveness last night following narcotic pain med admin, responded to narcan. Pt's vitals remained stable without desaturations or hypotension. Pt and family report increased anxiety following.     Medications:  Continuous Infusions:   Scheduled Meds:   acetaminophen  1,000 mg Intravenous Q8H    benazepril  20 mg Oral Daily    ciprofloxacin  400 mg Intravenous Q12H    hydrochlorothiazide  25 mg Oral Daily    metronidazole  500 mg Intravenous TID     PRN Meds:dextrose 50%, dextrose 50%, glucagon (human recombinant), glucose, glucose, ibuprofen, ondansetron     Review of patient's allergies indicates:  No Known Allergies  Objective:     Vital Signs (Most Recent):  Temp: 97.6 °F (36.4 °C) (07/25/17 0356)  Pulse: 65 (07/25/17 0356)  Resp: 16 (07/25/17 0356)  BP: (!) 112/55 (07/25/17 0356)  SpO2: 95 % (07/25/17 0356) Vital Signs (24h Range):  Temp:  [96.1 °F (35.6 °C)-97.8 °F (36.6 °C)] 97.6 °F (36.4 °C)  Pulse:  [51-80] 65  Resp:  [13-20] 16  SpO2:  [94 %-98 %] 95 %  BP: (108-156)/(55-93) 112/55     Weight: 80.3 kg (177 lb 1.6 oz)  Body mass index is 31.37 kg/m².    Intake/Output - Last 3 Shifts       07/23 0700 - 07/24 0659 07/24 0700 - 07/25 0659    P.O. 240 300    I.V. (mL/kg)  1800 (22.4)    IV Piggyback  500    Total Intake(mL/kg) 240 (3) 2600 (32.4)    Urine (mL/kg/hr)  400 (0.2)    Stool  0 (0)    Total Output   400    Net +240 +2200          Urine Occurrence 4 x 2 x    Stool Occurrence 0 x 1 x    Emesis Occurrence  0 x          Physical Exam   Constitutional: She is oriented to person, place, and time. She appears well-developed and well-nourished. No distress.   Cardiovascular: Normal rate and regular rhythm.    Pulmonary/Chest: Effort normal and breath sounds normal. No respiratory distress.   Abdominal: Soft. She exhibits no distension and no mass. There is tenderness (AppTTP). There is no rebound.   Neurological: She is alert  and oriented to person, place, and time.   Skin: Skin is warm and dry.   Psychiatric:   Anxious   `1    Significant Labs:  CBC:   Recent Labs  Lab 07/24/17  0431   WBC 6.32   RBC 4.13   HGB 12.0   HCT 36.2*      MCV 88   MCH 29.1   MCHC 33.1     CMP:   Recent Labs  Lab 07/25/17  0420     106   CALCIUM 8.6*  8.6*   ALBUMIN 2.9*  2.9*   PROT 5.8*  5.8*     138   K 3.7  3.7   CO2 25  25     105   BUN 12  12   CREATININE 0.7  0.7   ALKPHOS 72  72   ALT 12  12   AST 25  25   BILITOT 1.0  1.0       Significant Diagnostics:  I have reviewed all pertinent imaging results/findings within the past 24 hours.

## 2017-07-25 NOTE — PROGRESS NOTES
Received report from daisy KATHLEEN in PACU, pt had cysts removed from gallbladder, 4 lap sites to abd CDI, started on reg diet, 2 L O2 via nc with O2 sats %, c/o pain and tenderness. Pt transferred back to room via stretcher. Daughter at bedside. VSS. AAOx4.

## 2017-07-25 NOTE — SIGNIFICANT EVENT
Code Blue received at 6:15pm, arrived to bedside to find patient unresponsive but she did have a pulse so CPR was not initiated. Patient was being bagged and upon chart review, she had been receiving multiple frequent doses of hydromorphone. Narcan 0.4 x1 given and pt regained consciousness, on low dose nasal cannula satting at 99%. Spoke with IM1 cross cover MD and recommended frequent checks on the patient, every 20 minutes to assess mental status. Discussed with fellow, Dr. Renato Sepulveda. Informed primary team to consult Critical Care Medicine for any acute decompensation    Bandar Geiger MD   Critical Care Medicine PGY-2

## 2017-07-25 NOTE — PLAN OF CARE
Problem: Physical Therapy Goal  Goal: Physical Therapy Goal  No PT needs  Outcome: Outcome(s) achieved Date Met: 07/25/17  Patient has no PT needs, will be DC'd from inpatient services.       7/25/2017  Ezequiel Mathur, PT

## 2017-07-25 NOTE — PLAN OF CARE
Problem: Patient Care Overview  Goal: Plan of Care Review  Outcome: Outcome(s) achieved Date Met: 07/25/17  Pt AAOx4. Up in bed this morning, awake, conversing with staff and daughter at bedside, NAD noted. VSS. Resp e/u, SpO2 95% on ra. No c/o of pain. No N/V. Placed order for breakfast and ate 75%, tolerated without difficulty. Ambulated up and down halls with PT, no difficulties with gait or weakness noted. Able to perform ADL's independently. Voids spontaneously. Skin free of breakdown. Drsgs applied to abd CDI. Pt was provided with discharge instructions and education, and gave pt paper prescription. Follow up appointments discussed with pt. Wound care instructions taught alongside of pain mngmt. Pt and daughter both verbalized understanding. PIV to right hand and wrist removed, catheter intact, pressure applied and coban in place. No falls while hospitalized. Wheelchair services transported pt to personal vehicle.

## 2017-07-25 NOTE — PROGRESS NOTES
[facility]  General Surgery  Progress Note    Subjective:     History of Present Illness:  No notes on file    Post-Op Info:  Procedure(s) (LRB):  LAPAROSCOPIC CYST FENESTRATION  (N/A)   1 Day Post-Op     Interval History: POD 1 s/p cyst drainage. Pt with episode of decreased responsiveness last night following narcotic pain med admin, responded to narcan. Pt's vitals remained stable without desaturations or hypotension. Pt and family report increased anxiety following.     Medications:  Continuous Infusions:   Scheduled Meds:   acetaminophen  1,000 mg Intravenous Q8H    benazepril  20 mg Oral Daily    ciprofloxacin  400 mg Intravenous Q12H    hydrochlorothiazide  25 mg Oral Daily    metronidazole  500 mg Intravenous TID     PRN Meds:dextrose 50%, dextrose 50%, glucagon (human recombinant), glucose, glucose, ibuprofen, ondansetron     Review of patient's allergies indicates:  No Known Allergies  Objective:     Vital Signs (Most Recent):  Temp: 97.6 °F (36.4 °C) (07/25/17 0356)  Pulse: 65 (07/25/17 0356)  Resp: 16 (07/25/17 0356)  BP: (!) 112/55 (07/25/17 0356)  SpO2: 95 % (07/25/17 0356) Vital Signs (24h Range):  Temp:  [96.1 °F (35.6 °C)-97.8 °F (36.6 °C)] 97.6 °F (36.4 °C)  Pulse:  [51-80] 65  Resp:  [13-20] 16  SpO2:  [94 %-98 %] 95 %  BP: (108-156)/(55-93) 112/55     Weight: 80.3 kg (177 lb 1.6 oz)  Body mass index is 31.37 kg/m².    Intake/Output - Last 3 Shifts       07/23 0700 - 07/24 0659 07/24 0700 - 07/25 0659    P.O. 240 300    I.V. (mL/kg)  1800 (22.4)    IV Piggyback  500    Total Intake(mL/kg) 240 (3) 2600 (32.4)    Urine (mL/kg/hr)  400 (0.2)    Stool  0 (0)    Total Output   400    Net +240 +2200          Urine Occurrence 4 x 2 x    Stool Occurrence 0 x 1 x    Emesis Occurrence  0 x          Physical Exam   Constitutional: She is oriented to person, place, and time. She appears well-developed and well-nourished. No distress.   Cardiovascular: Normal rate and regular rhythm.     Pulmonary/Chest: Effort normal and breath sounds normal. No respiratory distress.   Abdominal: Soft. She exhibits no distension and no mass. There is tenderness (AppTTP). There is no rebound.   Neurological: She is alert and oriented to person, place, and time.   Skin: Skin is warm and dry.   Psychiatric:   Anxious   `1    Significant Labs:  CBC:   Recent Labs  Lab 07/24/17  0431   WBC 6.32   RBC 4.13   HGB 12.0   HCT 36.2*      MCV 88   MCH 29.1   MCHC 33.1     CMP:   Recent Labs  Lab 07/25/17  0420     106   CALCIUM 8.6*  8.6*   ALBUMIN 2.9*  2.9*   PROT 5.8*  5.8*     138   K 3.7  3.7   CO2 25  25     105   BUN 12  12   CREATININE 0.7  0.7   ALKPHOS 72  72   ALT 12  12   AST 25  25   BILITOT 1.0  1.0       Significant Diagnostics:  I have reviewed all pertinent imaging results/findings within the past 24 hours.    Assessment/Plan:     * Hepatic cyst    POD 1 s/p Lap Cyst fenestration    - No narcotic pain medications  - Adv diet as tolerated  - DVT prophylaxis             Brandon Zelaya MD  General Surgery  [facility]

## 2017-07-25 NOTE — ASSESSMENT & PLAN NOTE
POD 1 s/p Lap Cyst fenestration    - No narcotic pain medications  - Adv diet as tolerated  - DVT prophylaxis

## 2017-07-25 NOTE — PT/OT/SLP EVAL
"Physical Therapy  Evaluation/ DC Summary      Pauline Rodriguez   MRN: [id number]   Admitting Diagnosis: Hepatic cyst    PT Received On: 07/25/17  PT Start Time: 0750     PT Stop Time: 0810    PT Total Time (min): 20 min       Billable Minutes:  Evaluation 20 min    Diagnosis: Hepatic cyst      Past Medical History:   Diagnosis Date    Essential hypertension       Past Surgical History:   Procedure Laterality Date    HYSTERECTOMY         Referring physician: Raina Cool, NP  Date referred to PT: 7/24/17    General Precautions: Standard, fall  Orthopedic Precautions: N/A   Braces:         Do you have any cultural, spiritual, Yarsani conflicts, given your current situation?: none stated    Patient History:  Lives With: alone  Living Arrangements: house (no JUANITO)  Transportation Available: car  Living Environment Comment: Patient lives in a one story home, No JUANITO. Patient has a BSC that belonged to her late .   DME owned (not currently used): bedside commode    Previous Level of Function:  Ambulation Skills: independent  Transfer Skills: independent  ADL Skills: independent  Work/Leisure Activity: independent    Subjective:  Communicated with nurse prior to session.  Upon entering the room, the patient was found in the closet retrieving items. Upon explaining the purpose of the visit, patient stated "I probably move as well as you." Patient agreed to participate in therapy session.   Chief Complaint: pain s/p surgery  Patient goals: to get home    Pain/Comfort  Pain Rating 1: 2/10  Location - Side 1: Right  Location - Orientation 1: lower  Location 1: abdomen (incision site)      Objective:         Cognitive Exam:  Oriented to: Person, Place, Time and Situation    Follows Commands/attention: Follows multistep  commands  Communication: clear/fluent  Safety awareness/insight to disability: intact    Physical Exam:  Postural examination/scapula alignment: No postural abnormalities identified    Skin integrity: " Visible skin intact  Edema: None noted     Sensation:   Intact     Lower Extremity Range of Motion:  Right Lower Extremity: WFL  Left Lower Extremity: WFL    Lower Extremity Strength:  Right Lower Extremity: WFL  Left Lower Extremity: WFL     Fine motor coordination:  Intact    Gross motor coordination: WFL    Functional Mobility:  Bed Mobility:  Supine to Sit: Independent  Sit to Supine:  (Patient left standing in room w/ MD)    Transfers:  Sit <> Stand Assistance: Independent  Sit <> Stand Assistive Device: No Assistive Device    Gait:   Gait Distance: ~400'  Assistance 1: Independent  Gait Assistive Device: No device  Gait Pattern: reciprocal  Gait Deviation(s): decreased titus, decreased velocity of limb motion, decreased step length, decreased stride length  -Patient able to perform dynamic gait w/ 1 self corrected LOB. Patient to perform ambulation w/ head turning and tandem walking.       Balance:   Static Sit: NORMAL: No deviations seen in posture held statically  Dynamic Sit: GOOD+: Maintains balance through MAXIMAL excursions of active trunk motion  Static Stand: NORMAL: No deviations seen in posture held statically  Dynamic stand: GOOD+: Independent gait (with or without assistive device)    -Patient able to  an object from floor and return to standing w/ no LOB or decreased speed.   Therapeutic Activities and Exercises:  PT [place] completed    AM-PAC 6 CLICK MOBILITY  How much help from another person does this patient currently need?   1 = Unable, Total/Dependent Assistance  2 = A lot, Maximum/Moderate Assistance  3 = A little, Minimum/Contact Guard/Supervision  4 = None, Modified [place]/Independent    Turning over in bed (including adjusting bedclothes, sheets and blankets)?: 4  Sitting down on and standing up from a chair with arms (e.g., wheelchair, bedside commode, etc.): 4  Moving from lying on back to sitting on the side of the bed?: 4  Moving to and from a bed to a chair (including a  wheelchair)?: 4  Need to walk in hospital room?: 4  Climbing 3-5 steps with a railing?: 4  Total Score: 24     AM-PAC Raw Score CMS G-Code Modifier Level of Impairment Assistance   6 % Total / Unable   7 - 9 CM 80 - 100% Maximal Assist   10 - 14 CL 60 - 80% Moderate Assist   15 - 19 CK 40 - 60% Moderate Assist   20 - 22 CJ 20 - 40% Minimal Assist   23 CI 1-20% SBA / CGA   24 CH 0% Independent/ Mod I     Patient left standing in room with MD present.    Assessment:   Pauline Rodriguez is a 71 y.o. female with a medical diagnosis of Hepatic cyst and presents with pain s/p surgery. Patient has good mobility, gait endurance, dynamic balance, and functional strength. Patient had no adverse reactions during session. Patient has no PMH that would complicate the current treatment Plan. Patient's medical status is stable and eval complexity is Low. Patient has no further needs for PT services and is appropriate for d/c home to self care. Patient educated on self mobility during hospital stay.     Rehab identified problem list/impairments: Rehab identified problem list/impairments: weakness, impaired endurance    Rehab potential is excellent.    Activity tolerance: Excellent    Discharge recommendations: Discharge Facility/Level Of Care Needs: home     Barriers to discharge: Barriers to Discharge: None    Equipment recommendations: Equipment Needed After Discharge: none     GOALS:    Physical Therapy Goals     Not on file          Multidisciplinary Problems (Resolved)        Problem: Physical Therapy Goal    Goal Priority Disciplines Outcome Goal Variances Interventions   Physical Therapy Goal   (Resolved)     PT/OT, PT Outcome(s) achieved     Description:  No PT needs                    PLAN:    Patient to be seen   to address the above listed problems via    Plan of Care expires:    Plan of Care reviewed with: patient          Ezequiel Mathur, PT  07/25/2017

## 2017-07-25 NOTE — PROGRESS NOTES
Family member heard yelling for help in hallway. I ran to room with another RN. Pt was found to be cyanotic, nonresponsive, with palpable pulse. Code blue initiated at 1813. Ventilations provided via AmbuBag, electrodes and leads attached to pt and monitor, backboard placed. Code team (ICU provider, ICU nurses, respiratory, security, and house supervisor) arrived at bedside at approx 1815. Sats noted at 92-96%, pt regaining skin color. Pt sternal rubbed and responded by grunting. Pt placed on nonrebreather mask by respiratory. Team advised to administer narcan, medication given per order. Primary RN giving further details on pt's condition prior to code blue.

## 2017-07-25 NOTE — PLAN OF CARE
Problem: Patient Care Overview  Goal: Plan of Care Review  Outcome: Ongoing (interventions implemented as appropriate)  Pt AAOx4, VSS, no acute events overnight. IV antibiotics administered per order. Ibuprofen administered Q6h around-the-clock dosing utilized per patient and patient's daughter request. Pt verbalized IV tylenol effective for treating abdominal pain. Pt states pain is mainly felt after ambulating to bathroom. Weaned patient off of oxygen therapy. Patient ambulated to bathroom x2 during shift. Patient verbalized anticipation for discharge.      Problem: Fall Risk (Adult)  Goal: Identify Related Risk Factors and Signs and Symptoms  Related risk factors and signs and symptoms are identified upon initiation of Human Response Clinical Practice Guideline (CPG)   Outcome: Ongoing (interventions implemented as appropriate)  Pt and patient's daughter verbalized fall prevention plan. Bed alarm remained on, bed low and locked, daughter and son remained at bedside throughout shift, call light remained within reach. Pt remained free of falls/trauma/injury during shift.     Problem: Pain, Acute (Adult)  Goal: Identify Related Risk Factors and Signs and Symptoms  Related risk factors and signs and symptoms are identified upon initiation of Human Response Clinical Practice Guideline (CPG)   Outcome: Ongoing (interventions implemented as appropriate)  Patient's pain medication orders changed to IV tylenol and Ibuprofen PO. Patient verbalized relief of pain to abdomen with IV tylenol and ibuprofen for breakthrough pain management. Expresses pain as a burning sensation and sore at during shift. Steri-strips remain dry and intact to abdominal lap sites x4.    Problem: Pressure Ulcer Risk (Shane Scale) (Adult,Obstetrics,Pediatric)  Goal: Identify Related Risk Factors and Signs and Symptoms  Related risk factors and signs and symptoms are identified upon initiation of Human Response Clinical Practice Guideline (CPG)    Outcome: Ongoing (interventions implemented as appropriate)  Patient repositions self with minor assistance throughout shift. Skin remains dry and intact. Pt verbalized understanding of importance of frequent repositioning for pressure ulcer prevention.

## 2017-07-25 NOTE — DISCHARGE SUMMARY
[facility]  General Surgery  Discharge Summary      Patient Name: Pauline Rodriguez  MRN: [id number]  Admission Date: 7/20/2017  Hospital Length of Stay: 5 days  Discharge Date and Time:  07/25/2017 9:35 AM  Attending Physician: Liban Cummins MD   Discharging Provider: Raina Cool NP  Primary Care Provider: Charlotte Owens MD     HPI: POD 1 s/p cyst drainage. Pt with episode of decreased responsiveness last night following narcotic pain med admin, responded to narcan. Pt's vitals remained stable without desaturations or hypotension. Pt and family report increased anxiety following.     Procedure(s) (LRB):  LAPAROSCOPIC CYST FENESTRATION  (N/A)     Hospital Course: Ms. Rodriguez is POD # 1 Laparoscopic cyst fenestration.  Last night at 6:15 pm patient was unresponsive and did have a pulse and was bagged. Patient received multiple doses of hydromorphone yesterday.  Narcan was adminstered and the patient regained consciousness.  The patient is tolerating a regular diet.  She is voiding and ambulating without difficulty.  Patient with no complaints of nausea, vomiting, chest pain or shortness of breath.  Her vital signs stable. She is afebrile. She is positive for flatus and positive for bowel sounds.  CV: RRR  Lungs: CTA bilaterally  Abdomen:  Soft, non-tender, non-distended, positive for bowel sounds, incision clean, dry and intact.    Consults:   Consults         Status Ordering Provider     Inpatient consult to General Surgery  Once     Provider:  (Not yet assigned)    Completed LATRICIA DAMON     Inpatient consult to Hepatology  Once     Provider:  (Not yet assigned)    Completed ALEXIS GR          Significant Diagnostic Studies: Labs:   CMP   Recent Labs  Lab 07/24/17  0431 07/25/17  0420    138  138   K 2.9* 3.7  3.7    105  105   CO2 26 25  25   GLU 97 106  106   BUN 19 12  12   CREATININE 0.9 0.7  0.7   CALCIUM 9.2 8.6*  8.6*   PROT 6.5 5.8*  5.8*    ALBUMIN 3.5 2.9*  2.9*   BILITOT 1.4* 1.0  1.0   ALKPHOS 83 72  72   AST 19 25  25   ALT 13 12  12   ANIONGAP 12 8  8   ESTGFRAFRICA >60.0 >60.0  >60.0   EGFRNONAA >60.0 >60.0  >60.0    and CBC   Recent Labs  Lab 07/24/17  0431   WBC 6.32   HGB 12.0   HCT 36.2*          Pending Diagnostic Studies:     None        Final Active Diagnoses:    Diagnosis Date Noted POA    PRINCIPAL PROBLEM:  Hepatic cyst [K76.89] 07/23/2017 Yes    Hypokalemia [E87.6] 07/24/2017 No    Right upper quadrant abdominal pain [R10.11] 07/20/2017 Yes    Essential hypertension [I10]  Yes      Problems Resolved During this Admission:    Diagnosis Date Noted Date Resolved POA    Biloma [K66.8] 07/20/2017 07/23/2017 Yes      Discharged Condition: good    Disposition: Home or Self Care    Follow Up:  Follow-up Information     Liban Cummins MD. Schedule an appointment as soon as possible for a visit in 2 weeks.    Specialties:  General Surgery, Surgical Oncology  Contact information[facility]  [facility] [id number]  [phone]                 Patient Instructions:     Diet general     Activity as tolerated     Shower on day dressing removed (No bath)     Lifting restrictions   Order Comments: Nothing greater than 10Lbs for 6 weeks     Other restrictions (specify):   Order Comments: Do not drive on narcotic pain medications until reaction times are back to a safe level.   Limit strenuous activity such as raking or pushing/pulling heavy objects     Call MD for:  temperature >100.4     Call MD for:  persistent nausea and vomiting or diarrhea     Call MD for:  severe uncontrolled pain     Call MD for:  difficulty breathing or increased cough     Leave dressing on - Keep it clean, dry, and intact until clinic visit       Medications:  Reconciled Home Medications:   Current Discharge Medication List      START taking these medications    Details   oxycodone-acetaminophen (PERCOCET) 5-325 mg per tablet Take 1 tablet  by mouth every 4 (four) hours as needed for Pain.  Qty: 41 tablet, Refills: 0         CONTINUE these medications which have NOT CHANGED    Details   benazepril (LOTENSIN) 20 MG tablet Take 1 tablet (20 mg total) by mouth once daily.  Qty: 90 tablet, Refills: 3    Associated Diagnoses: Hypertension      hydrochlorothiazide (HYDRODIURIL) 25 MG tablet Take 1 tablet (25 mg total) by mouth once daily.  Qty: 90 tablet, Refills: 3    Associated Diagnoses: Hypertension      timolol (BETIMOL) 0.5 % ophthalmic solution Place 1 drop into both eyes 2 (two) times daily.        travoprost, benzalkonium, (TRAVATAN) 0.004 % ophthalmic solution Place 1 drop into both eyes every evening.              Raina Cool NP  General Surgery  [facility]

## 2017-07-25 NOTE — PLAN OF CARE
Patient discharging home today with no needs.  Surgical follow up visit written on AVS by patient's RN.    Future Appointments  Date Time Provider Department Center   8/8/2017 11:15 AM Liban Cummins MD [facility]          07/25/17 0939   Final Note   Assessment Type Final Discharge Note   Discharge Disposition Home   Hospital Follow Up  Appt(s) scheduled? Yes   Right Care Referral Info   Post Acute Recommendation No Care

## 2017-07-31 ENCOUNTER — TELEPHONE (OUTPATIENT)
Dept: SURGERY | Facility: CLINIC | Age: 72
End: 2017-07-31

## 2017-07-31 NOTE — TELEPHONE ENCOUNTER
Called pt's daughter, Lupis, and informed her that Dr. Cummins stated that her mother can return 2 wks post-op, on August 8, as she is currently scheduled.  Pt's daughter verbalized understanding.    ----- Message from Abdoulaye Rich sent at 7/28/2017  9:53 AM CDT -----  Contact: Lupis//Daughter  Caller states that (s)he needs to speak with nurse in ref to clarification on when the pts appt is supposed to be;caller states that she was told the pt was to be seen in 3wks but is scheduled for 2wks//please call back at [phone]//thank you

## 2017-08-08 ENCOUNTER — OFFICE VISIT (OUTPATIENT)
Dept: SURGERY | Facility: CLINIC | Age: 72
End: 2017-08-08
Payer: MEDICARE

## 2017-08-08 VITALS
SYSTOLIC BLOOD PRESSURE: 158 MMHG | HEART RATE: 68 BPM | TEMPERATURE: 97 F | WEIGHT: 164.25 LBS | BODY MASS INDEX: 28.04 KG/M2 | HEIGHT: 64 IN | DIASTOLIC BLOOD PRESSURE: 86 MMHG

## 2017-08-08 DIAGNOSIS — K76.89 HEPATIC CYST: Primary | ICD-10-CM

## 2017-08-08 PROCEDURE — 99024 POSTOP FOLLOW-UP VISIT: CPT | Mod: S$GLB,,, | Performed by: SURGERY

## 2017-08-08 PROCEDURE — 99999 PR PBB SHADOW E&M-EST. PATIENT-LVL III: CPT | Mod: PBBFAC,,, | Performed by: SURGERY

## 2017-08-08 NOTE — PROGRESS NOTES
HPI:  The patient is status post-laparoscopic liver cyst fenestration. She has 0/10 pain. She is eating well. The patient denies nausea or fever.    PHYSICAL EXAM:  Physical Exam   Constitutional: She is oriented to person, place, and time. She appears well-developed and well-nourished. No distress.   Cardiovascular: Normal rate.    Pulmonary/Chest: Effort normal.   Abdominal: Soft. She exhibits no distension. There is no tenderness.   Incisions healing well   Musculoskeletal: She exhibits no edema.   Neurological: She is alert and oriented to person, place, and time.   Skin: Skin is warm and dry.   Psychiatric: She has a normal mood and affect. Her behavior is normal.       ASSESSMENT:    The patient is doing well after surgery. Path reviewed: benign cyst     PLAN:    Follow up PRN.  Activity: regular duty        Agree with note above by Dr. Cowan, pt interviewed, examined, chart, labs, vitals, films reviewed.  I have reviewed and edited the note, the assessment/plan is my own.    Doing well s/p lap cyst fenestration, path benign  Does not require serial imaging or f/u.   Available PRN    Liban Cummins MD, FACS  Surgical Oncology  [facility] [place], [place]  Office: [phone]  Fax: [phone]

## 2017-08-11 PROBLEM — R10.11 RIGHT UPPER QUADRANT ABDOMINAL PAIN: Status: RESOLVED | Noted: 2017-07-20 | Resolved: 2017-08-11

## 2017-08-11 PROBLEM — K76.89 HEPATIC CYST: Status: RESOLVED | Noted: 2017-07-23 | Resolved: 2017-08-11

## 2017-08-11 PROBLEM — K76.89 SIMPLE HEPATIC CYST: Status: RESOLVED | Noted: 2017-07-20 | Resolved: 2017-07-23

## 2018-11-21 ENCOUNTER — TELEPHONE (OUTPATIENT)
Dept: RHEUMATOLOGY | Facility: CLINIC | Age: 73
End: 2018-11-21

## 2018-11-21 NOTE — TELEPHONE ENCOUNTER
----- Message from Adrienne Pratt sent at 11/21/2018 11:17 AM CST -----  Type: Needs Medical Advice    Who Called:  Patient  Best Call Back Number: [phone]  Additional Information: Would like to get an appointment because she cannot walk properly or take care of herself. She was diagnosed as having Osteoarthritis. Please call to schedule.

## 2018-11-30 ENCOUNTER — TELEPHONE (OUTPATIENT)
Dept: RHEUMATOLOGY | Facility: CLINIC | Age: 73
End: 2018-11-30

## 2018-11-30 NOTE — TELEPHONE ENCOUNTER
----- Message from Filippo Boyd sent at 11/30/2018  9:55 AM CST -----  Contact: pt  Type:  Apoointment Request    Caller is requesting a sooner appointment.      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

## 2019-05-16 ENCOUNTER — OFFICE VISIT (OUTPATIENT)
Dept: RHEUMATOLOGY | Facility: CLINIC | Age: 74
End: 2019-05-16
Payer: MEDICARE

## 2019-05-16 ENCOUNTER — HOSPITAL ENCOUNTER (OUTPATIENT)
Dept: RADIOLOGY | Facility: HOSPITAL | Age: 74
Discharge: HOME | End: 2019-05-16
Attending: INTERNAL MEDICINE
Payer: MEDICARE

## 2019-05-16 VITALS
BODY MASS INDEX: 28.88 KG/M2 | HEIGHT: 63 IN | DIASTOLIC BLOOD PRESSURE: 87 MMHG | SYSTOLIC BLOOD PRESSURE: 154 MMHG | HEART RATE: 62 BPM | WEIGHT: 163 LBS

## 2019-05-16 DIAGNOSIS — M47.819 SPONDYLARTHRITIS: ICD-10-CM

## 2019-05-16 DIAGNOSIS — M25.551 HIP PAIN, BILATERAL: ICD-10-CM

## 2019-05-16 DIAGNOSIS — E55.9 VITAMIN D DEFICIENCY: ICD-10-CM

## 2019-05-16 DIAGNOSIS — G89.29 CHRONIC MIDLINE LOW BACK PAIN WITHOUT SCIATICA: ICD-10-CM

## 2019-05-16 DIAGNOSIS — R94.6 ABNORMAL RESULTS OF THYROID FUNCTION STUDIES: ICD-10-CM

## 2019-05-16 DIAGNOSIS — M54.2 NECK PAIN: ICD-10-CM

## 2019-05-16 DIAGNOSIS — M54.50 CHRONIC MIDLINE LOW BACK PAIN WITHOUT SCIATICA: ICD-10-CM

## 2019-05-16 DIAGNOSIS — M13.80 MIGRATORY POLYARTHRITIS: ICD-10-CM

## 2019-05-16 DIAGNOSIS — M25.552 HIP PAIN, BILATERAL: ICD-10-CM

## 2019-05-16 DIAGNOSIS — M13.80 MIGRATORY POLYARTHRITIS: Primary | ICD-10-CM

## 2019-05-16 PROCEDURE — 72100 XR LUMBAR SPINE AP AND LATERAL: ICD-10-PCS | Mod: 26,,, | Performed by: RADIOLOGY

## 2019-05-16 PROCEDURE — 72040 XR CERVICAL SPINE AP LATERAL: ICD-10-PCS | Mod: 26,,, | Performed by: RADIOLOGY

## 2019-05-16 PROCEDURE — 99999 PR PBB SHADOW E&M-EST. PATIENT-LVL III: CPT | Mod: PBBFAC,,, | Performed by: INTERNAL MEDICINE

## 2019-05-16 PROCEDURE — 3077F PR MOST RECENT SYSTOLIC BLOOD PRESSURE >= 140 MM HG: ICD-10-PCS | Mod: CPTII,S$GLB,, | Performed by: INTERNAL MEDICINE

## 2019-05-16 PROCEDURE — 1101F PT FALLS ASSESS-DOCD LE1/YR: CPT | Mod: CPTII,S$GLB,, | Performed by: INTERNAL MEDICINE

## 2019-05-16 PROCEDURE — 72040 X-RAY EXAM NECK SPINE 2-3 VW: CPT | Mod: TC,FY,PO

## 2019-05-16 PROCEDURE — 3077F SYST BP >= 140 MM HG: CPT | Mod: CPTII,S$GLB,, | Performed by: INTERNAL MEDICINE

## 2019-05-16 PROCEDURE — 73521 X-RAY EXAM HIPS BI 2 VIEWS: CPT | Mod: 26,,, | Performed by: RADIOLOGY

## 2019-05-16 PROCEDURE — 99205 OFFICE O/P NEW HI 60 MIN: CPT | Mod: 25,S$GLB,, | Performed by: INTERNAL MEDICINE

## 2019-05-16 PROCEDURE — 3079F DIAST BP 80-89 MM HG: CPT | Mod: CPTII,S$GLB,, | Performed by: INTERNAL MEDICINE

## 2019-05-16 PROCEDURE — 73521 XR HIPS BILATERAL 2 VIEW INCL AP PELVIS: ICD-10-PCS | Mod: 26,,, | Performed by: RADIOLOGY

## 2019-05-16 PROCEDURE — 73521 X-RAY EXAM HIPS BI 2 VIEWS: CPT | Mod: TC,FY,PO

## 2019-05-16 PROCEDURE — 72100 X-RAY EXAM L-S SPINE 2/3 VWS: CPT | Mod: 26,,, | Performed by: RADIOLOGY

## 2019-05-16 PROCEDURE — 72100 X-RAY EXAM L-S SPINE 2/3 VWS: CPT | Mod: TC,FY,PO

## 2019-05-16 PROCEDURE — 96372 THER/PROPH/DIAG INJ SC/IM: CPT | Mod: S$GLB,,, | Performed by: INTERNAL MEDICINE

## 2019-05-16 PROCEDURE — 99205 PR OFFICE/OUTPT VISIT, NEW, LEVL V, 60-74 MIN: ICD-10-PCS | Mod: 25,S$GLB,, | Performed by: INTERNAL MEDICINE

## 2019-05-16 PROCEDURE — 72040 X-RAY EXAM NECK SPINE 2-3 VW: CPT | Mod: 26,,, | Performed by: RADIOLOGY

## 2019-05-16 PROCEDURE — 1101F PR PT FALLS ASSESS DOC 0-1 FALLS W/OUT INJ PAST YR: ICD-10-PCS | Mod: CPTII,S$GLB,, | Performed by: INTERNAL MEDICINE

## 2019-05-16 PROCEDURE — 3079F PR MOST RECENT DIASTOLIC BLOOD PRESSURE 80-89 MM HG: ICD-10-PCS | Mod: CPTII,S$GLB,, | Performed by: INTERNAL MEDICINE

## 2019-05-16 PROCEDURE — 99999 PR PBB SHADOW E&M-EST. PATIENT-LVL III: ICD-10-PCS | Mod: PBBFAC,,, | Performed by: INTERNAL MEDICINE

## 2019-05-16 PROCEDURE — 96372 PR INJECTION,THERAP/PROPH/DIAG2ST, IM OR SUBCUT: ICD-10-PCS | Mod: S$GLB,,, | Performed by: INTERNAL MEDICINE

## 2019-05-16 RX ORDER — IBUPROFEN 200 MG
200 TABLET ORAL EVERY 6 HOURS PRN
COMMUNITY
End: 2019-06-13

## 2019-05-16 RX ORDER — TIMOLOL MALEATE 5 MG/ML
1 SOLUTION/ DROPS OPHTHALMIC DAILY
COMMUNITY
Start: 2018-01-26 | End: 2021-02-18

## 2019-05-16 RX ORDER — MELOXICAM 15 MG/1
15 TABLET ORAL DAILY
Qty: 30 TABLET | Refills: 6 | Status: SHIPPED | OUTPATIENT
Start: 2019-05-16 | End: 2019-06-13

## 2019-05-16 RX ORDER — LATANOPROST 50 UG/ML
1 SOLUTION/ DROPS OPHTHALMIC DAILY
COMMUNITY
Start: 2018-04-13

## 2019-05-16 RX ORDER — KETOROLAC TROMETHAMINE 30 MG/ML
60 INJECTION, SOLUTION INTRAMUSCULAR; INTRAVENOUS
Status: COMPLETED | OUTPATIENT
Start: 2019-05-16 | End: 2019-05-16

## 2019-05-16 RX ORDER — DICLOFENAC SODIUM 10 MG/G
2 GEL TOPICAL DAILY
Qty: 100 G | Refills: 5 | Status: SHIPPED | OUTPATIENT
Start: 2019-05-16 | End: 2019-11-15

## 2019-05-16 RX ADMIN — KETOROLAC TROMETHAMINE 60 MG: 30 INJECTION, SOLUTION INTRAMUSCULAR; INTRAVENOUS at 11:05

## 2019-05-16 NOTE — PROGRESS NOTES
Administered 2 cc ( 30 mg/ml ) of toradol to the right upper outer gluteal. Informed of s/s to report verbalized understanding. No adverse reactions noted.    Lot # [id number]  Expiration 1 may 2020
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
## 2019-05-23 ENCOUNTER — PATIENT MESSAGE (OUTPATIENT)
Dept: RHEUMATOLOGY | Facility: CLINIC | Age: 74
End: 2019-05-23

## 2019-06-05 ENCOUNTER — TELEPHONE (OUTPATIENT)
Dept: RHEUMATOLOGY | Facility: CLINIC | Age: 74
End: 2019-06-05

## 2019-06-05 NOTE — TELEPHONE ENCOUNTER
----- Message from Fani Noble sent at 6/5/2019 11:03 AM CDT -----  Contact: Pat  Type: Needs Medical Advice    Who Called:  patient  Best Call Back Number: [phone]  Additional Information: patient wants to be seen today by anyone that can see her--she has an appt on 6/13 but states she is in so much pain & doesn't feel that she can wait that long--patient lays around all day & moans in pain every time she moves--patient taking Meloxicam but found out that she can't take any other meds with that--patient states that the Meloxicam isn't helping her at all & she feels that not even if she could take Ibuprofen right now that it would help as the pain is too bad--is there anything that she can take to give her relief until she can be seen on 6/13? please advise--thank you

## 2019-06-13 ENCOUNTER — OFFICE VISIT (OUTPATIENT)
Dept: RHEUMATOLOGY | Facility: CLINIC | Age: 74
End: 2019-06-13
Payer: MEDICARE

## 2019-06-13 VITALS
WEIGHT: 160 LBS | HEART RATE: 68 BPM | DIASTOLIC BLOOD PRESSURE: 85 MMHG | BODY MASS INDEX: 28.35 KG/M2 | HEIGHT: 63 IN | SYSTOLIC BLOOD PRESSURE: 139 MMHG

## 2019-06-13 DIAGNOSIS — M45.9 ANKYLOSING SPONDYLITIS, UNSPECIFIED SITE OF SPINE: ICD-10-CM

## 2019-06-13 DIAGNOSIS — M13.80 MIGRATORY POLYARTHRITIS: Primary | ICD-10-CM

## 2019-06-13 DIAGNOSIS — M47.819 SPONDYLARTHRITIS: ICD-10-CM

## 2019-06-13 PROCEDURE — 3075F PR MOST RECENT SYSTOLIC BLOOD PRESS GE 130-139MM HG: ICD-10-PCS | Mod: CPTII,S$GLB,, | Performed by: INTERNAL MEDICINE

## 2019-06-13 PROCEDURE — 99215 PR OFFICE/OUTPT VISIT, EST, LEVL V, 40-54 MIN: ICD-10-PCS | Mod: S$GLB,,, | Performed by: INTERNAL MEDICINE

## 2019-06-13 PROCEDURE — 3075F SYST BP GE 130 - 139MM HG: CPT | Mod: CPTII,S$GLB,, | Performed by: INTERNAL MEDICINE

## 2019-06-13 PROCEDURE — 99999 PR PBB SHADOW E&M-EST. PATIENT-LVL III: CPT | Mod: PBBFAC,,, | Performed by: INTERNAL MEDICINE

## 2019-06-13 PROCEDURE — 1101F PR PT FALLS ASSESS DOC 0-1 FALLS W/OUT INJ PAST YR: ICD-10-PCS | Mod: CPTII,S$GLB,, | Performed by: INTERNAL MEDICINE

## 2019-06-13 PROCEDURE — 1101F PT FALLS ASSESS-DOCD LE1/YR: CPT | Mod: CPTII,S$GLB,, | Performed by: INTERNAL MEDICINE

## 2019-06-13 PROCEDURE — 3079F DIAST BP 80-89 MM HG: CPT | Mod: CPTII,S$GLB,, | Performed by: INTERNAL MEDICINE

## 2019-06-13 PROCEDURE — 99215 OFFICE O/P EST HI 40 MIN: CPT | Mod: S$GLB,,, | Performed by: INTERNAL MEDICINE

## 2019-06-13 PROCEDURE — 99999 PR PBB SHADOW E&M-EST. PATIENT-LVL III: ICD-10-PCS | Mod: PBBFAC,,, | Performed by: INTERNAL MEDICINE

## 2019-06-13 PROCEDURE — 3079F PR MOST RECENT DIASTOLIC BLOOD PRESSURE 80-89 MM HG: ICD-10-PCS | Mod: CPTII,S$GLB,, | Performed by: INTERNAL MEDICINE

## 2019-06-13 RX ORDER — SUCRALFATE 1 G/1
1 TABLET ORAL 4 TIMES DAILY
Qty: 120 TABLET | Refills: 3 | Status: SHIPPED | OUTPATIENT
Start: 2019-06-13 | End: 2019-06-14

## 2019-06-13 RX ORDER — IBUPROFEN 600 MG/1
600 TABLET ORAL EVERY 8 HOURS PRN
Qty: 90 TABLET | Refills: 5 | Status: SHIPPED | OUTPATIENT
Start: 2019-06-13 | End: 2019-07-13

## 2019-06-13 ASSESSMENT — ROUTINE ASSESSMENT OF PATIENT INDEX DATA (RAPID3)
TOTAL RAPID3 SCORE: 8.5
MDHAQ FUNCTION SCORE: 3
PAIN SCORE: 6
PATIENT GLOBAL ASSESSMENT SCORE: 9.5
PSYCHOLOGICAL DISTRESS SCORE: 1.1

## 2019-06-13 NOTE — PROGRESS NOTES
Subjective:       Patient ID: Pauline Rodriguez is a 73 y.o. female.    Chief Complaint: No chief complaint on file.    Follow up: 72 yo female positive, severe oa changes B hips cervical and lumbar fusion,  Pain is located in multiple joints, both shoulder(s), both elbow(s), both wrist(s), both MCP(s): 1st, 2nd, 3rd, 4th and 5th, both PIP(s): 1st, 2nd, 3rd, 4th and 5th, both DIP(s): 1st and 2nd, both hip(s), both knee(s) and both MTP(s): 1st, 2nd, 3rd, 4th and 5th, is described as aching, pulsating, shooting and throbbing, and is constant, moderate .  Associated symptoms include: crepitation, decreased range of motion, edema, effusion, tenderness and warmth.      Review of Systems   Constitutional: Positive for chills and fatigue. Negative for activity change, appetite change, diaphoresis, fever and unexpected weight change.   HENT: Negative for congestion, dental problem, ear discharge, ear pain, facial swelling, mouth sores, nosebleeds, postnasal drip, rhinorrhea, sinus pressure, sneezing, sore throat, tinnitus, trouble swallowing and voice change.    Eyes: Negative for photophobia, pain, discharge, redness and itching.   Respiratory: Negative for apnea, cough, chest tightness, shortness of breath and wheezing.    Cardiovascular: Positive for leg swelling. Negative for chest pain and palpitations.   Gastrointestinal: Positive for abdominal pain. Negative for abdominal distention, constipation, diarrhea, nausea and vomiting.   Endocrine: Negative for cold intolerance, heat intolerance, polydipsia and polyuria.   Genitourinary: Negative for decreased urine volume, difficulty urinating, dysuria, flank pain, frequency, hematuria and urgency.   Musculoskeletal: Positive for arthralgias, back pain, gait problem, joint swelling, myalgias, neck pain and neck stiffness.   Skin: Negative for pallor, rash and wound.   Allergic/Immunologic: Negative for immunocompromised state.   Neurological: Positive for weakness. Negative for  "dizziness, tremors, numbness and headaches.   Hematological: Negative for adenopathy. Does not bruise/bleed easily.   Psychiatric/Behavioral: Negative for sleep disturbance. The patient is not nervous/anxious.          Objective:   /85 (BP Location: Left arm, Patient Position: Sitting, BP Method: Medium (Automatic))   Pulse 68   Ht 5' 3" (1.6 m)   Wt 72.6 kg (160 lb)   BMI 28.34 kg/m²      Physical Exam   Nursing note and vitals reviewed.  Constitutional: She is oriented to person, place, and time and well-developed, well-nourished, and in no distress.   HENT:   Head: Normocephalic and atraumatic.   Mouth/Throat: Oropharynx is clear and moist.   Eyes: EOM are normal. Pupils are equal, round, and reactive to light.   Neck: Neck supple. No thyromegaly present.   Cardiovascular: Normal rate, regular rhythm and normal heart sounds.  Exam reveals no gallop and no friction rub.    No murmur heard.  Pulmonary/Chest: Breath sounds normal. She has no wheezes. She has no rales. She exhibits no tenderness.   Abdominal: There is no tenderness. There is no rebound and no guarding.       Right Side Rheumatological Exam     Examination finds the elbow, 1st MCP, 2nd MCP, 3rd MCP, 4th MCP and 5th MCP normal.    The patient is tender to palpation of the shoulder, wrist, knee, 1st PIP, 1st MCP, 2nd PIP, 2nd MCP, 3rd PIP, 3rd MCP, 4th PIP, 4th MCP, 5th PIP and 5th MCP    She has swelling of the knee, 1st PIP, 1st MCP, 2nd PIP, 2nd MCP, 3rd PIP, 3rd MCP, 4th PIP, 4th MCP, 5th PIP and 5th MCP    The patient has an enlarged wrist, 1st PIP, 2nd PIP, 3rd PIP, 4th PIP and 5th PIP    Shoulder Exam   Tenderness Location: no tenderness    Range of Motion   Active abduction: abnormal   Adduction: abnormal  Sensation: normal    Knee Exam   Tenderness Location: medial joint line  Patellofemoral Crepitus: positive  Effusion: negative  Sensation: normal    Hip Exam   Tenderness Location: posterior  Sensation: normal    Elbow/Wrist Exam "   Tenderness Location: no tenderness  Sensation: normal    Left Side Rheumatological Exam     Examination finds the elbow, knee, 1st MCP, 2nd MCP, 3rd MCP, 4th MCP and 5th MCP normal.    The patient is tender to palpation of the shoulder, wrist, knee, 1st PIP, 1st MCP, 2nd PIP, 2nd MCP, 3rd PIP, 3rd MCP, 4th PIP, 4th MCP, 5th PIP and 5th MCP.    She has swelling of the 1st PIP, 1st MCP, 2nd PIP, 2nd MCP, 3rd PIP, 3rd MCP, 4th PIP, 4th MCP, 5th PIP and 5th MCP    The patient has an enlarged wrist, 1st PIP, 2nd PIP, 3rd PIP, 4th PIP and 5th PIP.    Shoulder Exam   Tenderness Location: no tenderness    Range of Motion   Active abduction: abnormal   Sensation: normal    Knee Exam   Tenderness Location: lateral joint line and medial joint line    Patellofemoral Crepitus: positive  Effusion: negative  Sensation: normal    Hip Exam   Tenderness Location: posterior  Sensation: normal    Elbow/Wrist Exam   Sensation: normal      Back/Neck Exam   General Inspection   Gait: normal       Tenderness Right paramedian tenderness of the Lower C-Spine and Lower L-Spine.Left paramedian tenderness of the Upper C-Spine and Lower L-Spine.      Lymphadenopathy:     She has no cervical adenopathy.   Neurological: She is alert and oriented to person, place, and time. She has normal sensation and normal strength. Gait normal.   Reflex Scores:       Patellar reflexes are 3+ on the right side and 3+ on the left side.  Skin: No rash noted. No erythema. No pallor.     Psychiatric: Mood and affect normal.   Musculoskeletal: She exhibits tenderness and deformity. She exhibits no edema.           Results for orders placed or performed in visit on 05/16/19   LAURA Screen w/Reflex   Result Value Ref Range    LAURA Screen Positive (A) Negative <1:160   Anti Sm/RNP Antibody   Result Value Ref Range    Anti Sm/RNP Antibody 1.34 0.00 - 19.99 EU    Anti-Sm/RNP Interpretation Negative Negative   Anti-DNA antibody, double-stranded   Result Value Ref Range     ds DNA Ab Negative 1:10 Negative 1:10   Anti-Histone Antibody   Result Value Ref Range    Anti-Histone Antibody 0.6 0.0 - 0.9 Units   Anti-scleroderma antibody   Result Value Ref Range    Scleroderma SCL- 2 <20 UNITS   Anti-Smith antibody   Result Value Ref Range    Anti Sm Antibody 0.71 0.00 - 19.99 EU    Anti-Sm Interpretation Negative Negative   Sedimentation rate   Result Value Ref Range    Sed Rate 16 0 - 20 mm/Hr   Rheumatoid factor   Result Value Ref Range    Rheumatoid Factor <10.0 0.0 - 15.0 IU/mL   Sjogrens syndrome-A extractable nuclear antibody   Result Value Ref Range    Anti-SSA Antibody 1.12 0.00 - 19.99 EU    Anti-SSA Interpretation Negative Negative   Sjogrens syndrome-B extractable nuclear antibody   Result Value Ref Range    Anti-SSB Antibody 0.57 0.00 - 19.99 EU    Anti-SSB Interpretation Negative Negative   TSH   Result Value Ref Range    TSH 0.925 0.400 - 4.000 uIU/mL   Thyroid peroxidase antibody   Result Value Ref Range    Thyroperoxidase Antibodies <6.0 <6.0 IU/mL   CBC auto differential   Result Value Ref Range    WBC 6.83 3.90 - 12.70 K/uL    RBC 3.93 (L) 4.00 - 5.40 M/uL    Hemoglobin 11.4 (L) 12.0 - 16.0 g/dL    Hematocrit 35.9 (L) 37.0 - 48.5 %    Mean Corpuscular Volume 91 82 - 98 fL    Mean Corpuscular Hemoglobin 29.0 27.0 - 31.0 pg    Mean Corpuscular Hemoglobin Conc 31.8 (L) 32.0 - 36.0 g/dL    RDW 13.2 11.5 - 14.5 %    Platelets 256 150 - 350 K/uL    MPV 10.6 9.2 - 12.9 fL    Immature Granulocytes 0.1 0.0 - 0.5 %    Gran # (ANC) 4.1 1.8 - 7.7 K/uL    Immature Grans (Abs) 0.01 0.00 - 0.04 K/uL    Lymph # 2.2 1.0 - 4.8 K/uL    Mono # 0.4 0.3 - 1.0 K/uL    Eos # 0.2 0.0 - 0.5 K/uL    Baso # 0.03 0.00 - 0.20 K/uL    nRBC 0 0 /100 WBC    Gran% 59.8 38.0 - 73.0 %    Lymph% 31.5 18.0 - 48.0 %    Mono% 5.6 4.0 - 15.0 %    Eosinophil% 2.6 0.0 - 8.0 %    Basophil% 0.4 0.0 - 1.9 %    Differential Method Automated    Comprehensive metabolic panel   Result Value Ref Range    Sodium 140 136 -  145 mmol/L    Potassium 3.4 (L) 3.5 - 5.1 mmol/L    Chloride 104 95 - 110 mmol/L    CO2 27 23 - 29 mmol/L    Glucose 147 (H) 70 - 110 mg/dL    BUN, Bld 16 8 - 23 mg/dL    Creatinine 0.8 0.5 - 1.4 mg/dL    Calcium 10.0 8.7 - 10.5 mg/dL    Total Protein 6.9 6.0 - 8.4 g/dL    Albumin 3.9 3.5 - 5.2 g/dL    Total Bilirubin 0.9 0.1 - 1.0 mg/dL    Alkaline Phosphatase 97 55 - 135 U/L    AST 22 10 - 40 U/L    ALT 14 10 - 44 U/L    Anion Gap 9 8 - 16 mmol/L    eGFR if African American >60.0 >60 mL/min/1.73 m^2    eGFR if non African American >60.0 >60 mL/min/1.73 m^2   C-reactive protein   Result Value Ref Range    CRP 5.7 0.0 - 8.2 mg/L   T4, free   Result Value Ref Range    Free T4 1.04 0.71 - 1.51 ng/dL   T3, free   Result Value Ref Range    T3, Free 2.4 2.3 - 4.2 pg/mL   Vitamin D   Result Value Ref Range    Vit D, 25-Hydroxy 11 (L) 30 - 96 ng/mL   Cyclic citrul peptide antibody, IgG   Result Value Ref Range    CCP Antibodies 3.6 <5.0 U/mL   HLA B27 Antigen   Result Value Ref Range    B27 Testing Date 05/24/2019 12:00 AM     HLA B27 Result Negative    LUARA Titer   Result Value Ref Range    LAURA HEP-2 Titer Positive 1:160 Speckled         Assessment:       1. Migratory polyarthritis    2. Spondylarthritis    3. Ankylosing spondylitis, unspecified site of spine            Plan:       Diagnoses and all orders for this visit:    Migratory polyarthritis  -     ibuprofen (ADVIL,MOTRIN) 600 MG tablet; Take 1 tablet (600 mg total) by mouth every 8 (eight) hours as needed for Pain.  -     Discontinue: sucralfate (CARAFATE) 1 gram tablet; Take 1 tablet (1 g total) by mouth 4 (four) times daily.  -     CBC auto differential; Future  -     Comprehensive metabolic panel; Future  -     C-reactive protein; Future  -     Sedimentation rate; Future    Spondylarthritis  -     ibuprofen (ADVIL,MOTRIN) 600 MG tablet; Take 1 tablet (600 mg total) by mouth every 8 (eight) hours as needed for Pain.  -     Discontinue: sucralfate (CARAFATE) 1  gram tablet; Take 1 tablet (1 g total) by mouth 4 (four) times daily.  -     CBC auto differential; Future  -     Comprehensive metabolic panel; Future  -     C-reactive protein; Future  -     Sedimentation rate; Future    Ankylosing spondylitis, unspecified site of spine  -     ibuprofen (ADVIL,MOTRIN) 600 MG tablet; Take 1 tablet (600 mg total) by mouth every 8 (eight) hours as needed for Pain.  -     Discontinue: sucralfate (CARAFATE) 1 gram tablet; Take 1 tablet (1 g total) by mouth 4 (four) times daily.  -     CBC auto differential; Future  -     Comprehensive metabolic panel; Future  -     C-reactive protein; Future  -     Sedimentation rate; Future     xray cervical spine was fused by xray  More than 50% of the 60 minute encounter was spent face to face counseling the patient regarding current status and future plan of care as well as side of the medications. All questions were answered to patient's satisfaction

## 2019-06-14 ENCOUNTER — PATIENT MESSAGE (OUTPATIENT)
Dept: RHEUMATOLOGY | Facility: CLINIC | Age: 74
End: 2019-06-14

## 2019-06-17 RX ORDER — SUCRALFATE 1 G/10ML
1 SUSPENSION ORAL 4 TIMES DAILY
Qty: 414 ML | Refills: 3 | Status: SHIPPED | OUTPATIENT
Start: 2019-06-17 | End: 2019-11-15

## 2019-07-14 ENCOUNTER — PATIENT MESSAGE (OUTPATIENT)
Dept: RHEUMATOLOGY | Facility: CLINIC | Age: 74
End: 2019-07-14

## 2019-07-22 ENCOUNTER — PATIENT MESSAGE (OUTPATIENT)
Dept: RHEUMATOLOGY | Facility: CLINIC | Age: 74
End: 2019-07-22

## 2019-07-22 DIAGNOSIS — R94.6 ABNORMAL RESULTS OF THYROID FUNCTION STUDIES: ICD-10-CM

## 2019-07-22 DIAGNOSIS — M13.80 MIGRATORY POLYARTHRITIS: Primary | ICD-10-CM

## 2019-07-22 DIAGNOSIS — M45.9 ANKYLOSING SPONDYLITIS, UNSPECIFIED SITE OF SPINE: ICD-10-CM

## 2019-07-22 DIAGNOSIS — M47.819 SPONDYLARTHRITIS: ICD-10-CM

## 2019-07-23 ENCOUNTER — PATIENT MESSAGE (OUTPATIENT)
Dept: RHEUMATOLOGY | Facility: CLINIC | Age: 74
End: 2019-07-23

## 2019-07-23 RX ORDER — IBUPROFEN AND FAMOTIDINE 26.6; 8 MG/1; MG/1
1 TABLET ORAL 3 TIMES DAILY
Qty: 90 TABLET | Refills: 6 | Status: SHIPPED | OUTPATIENT
Start: 2019-07-23 | End: 2019-11-15

## 2019-07-25 ENCOUNTER — TELEPHONE (OUTPATIENT)
Dept: RHEUMATOLOGY | Facility: CLINIC | Age: 74
End: 2019-07-25

## 2019-07-25 ENCOUNTER — TELEPHONE (OUTPATIENT)
Dept: PHARMACY | Facility: CLINIC | Age: 74
End: 2019-07-25

## 2019-07-25 ENCOUNTER — TELEPHONE (OUTPATIENT)
Dept: PHARMACY | Facility: AMBULARY SURGERY CENTER | Age: 74
End: 2019-07-25

## 2019-07-25 NOTE — TELEPHONE ENCOUNTER
Spoke with patient notifying her PA on Duexis was approved resulting in a copayment of $499.00.    Explained to patient that copayment was high b/c she has a high tier deductible of $400 to meet annually, then her copayment of $99.00.      I explained I have sent her information to Yana Mirza for assistance and she would be reaching out to the patient.    Patient extremely thankful for the assistance.    PA information:  [facility]  [phone]  PA case ID # [id number]  PA Approval Dates: 7/25/19-12/31/19  PA Approved for Duexis 800-26.6 MG # 90 per 30 days    Thanks,   Laurel Rojo CPhT, B.A  Patient Care Advocate   [facility]   Phone: [phone] Ext [phone]  Fax: [phone]

## 2019-07-25 NOTE — TELEPHONE ENCOUNTER
----- Message from Laurel Rojo sent at 7/25/2019  3:57 PM CDT -----  Regarding: Duexis PA Approved   Good Afternoon,    Duexis PA approved through 12/31/19 resulting in a copayment of $499.00.    Explained to patient that copayment was high b/c she has a high tier deductible of $400 to meet annually, then her copayment of $99.00.      I have sent her information to Yana Mirza for assistance and she would be reaching out to the patient.    Patient extremely thankful for the assistance.    Thanks,   Laurel Rojo CPhT, B.A  Patient Care Advocate   [facility]   Phone: [phone] Ext [phone]  Fax: [phone]

## 2019-07-29 ENCOUNTER — TELEPHONE (OUTPATIENT)
Dept: RHEUMATOLOGY | Facility: CLINIC | Age: 74
End: 2019-07-29

## 2019-07-29 DIAGNOSIS — M45.9 ANKYLOSING SPONDYLITIS, UNSPECIFIED SITE OF SPINE: ICD-10-CM

## 2019-07-29 DIAGNOSIS — M47.819 SPONDYLARTHRITIS: Primary | ICD-10-CM

## 2019-10-04 ENCOUNTER — PATIENT MESSAGE (OUTPATIENT)
Dept: RHEUMATOLOGY | Facility: CLINIC | Age: 74
End: 2019-10-04

## 2019-10-21 ENCOUNTER — LAB VISIT (OUTPATIENT)
Dept: LAB | Facility: HOSPITAL | Age: 74
End: 2019-10-21
Attending: INTERNAL MEDICINE
Payer: MEDICARE

## 2019-10-21 DIAGNOSIS — M47.819 SPONDYLARTHRITIS: ICD-10-CM

## 2019-10-21 DIAGNOSIS — M45.9 ANKYLOSING SPONDYLITIS, UNSPECIFIED SITE OF SPINE: ICD-10-CM

## 2019-10-21 DIAGNOSIS — M13.80 MIGRATORY POLYARTHRITIS: ICD-10-CM

## 2019-10-21 LAB
ALBUMIN SERPL BCP-MCNC: 3.7 G/DL (ref 3.5–5.2)
ALP SERPL-CCNC: 95 U/L (ref 55–135)
ALT SERPL W/O P-5'-P-CCNC: 10 U/L (ref 10–44)
ANION GAP SERPL CALC-SCNC: 8 MMOL/L (ref 8–16)
AST SERPL-CCNC: 17 U/L (ref 10–40)
BASOPHILS # BLD AUTO: 0.03 K/UL (ref 0–0.2)
BASOPHILS NFR BLD: 0.4 % (ref 0–1.9)
BILIRUB SERPL-MCNC: 0.9 MG/DL (ref 0.1–1)
BUN SERPL-MCNC: 22 MG/DL (ref 8–23)
CALCIUM SERPL-MCNC: 9.3 MG/DL (ref 8.7–10.5)
CHLORIDE SERPL-SCNC: 104 MMOL/L (ref 95–110)
CO2 SERPL-SCNC: 29 MMOL/L (ref 23–29)
CREAT SERPL-MCNC: 0.8 MG/DL (ref 0.5–1.4)
CRP SERPL-MCNC: 6.2 MG/L (ref 0–8.2)
DIFFERENTIAL METHOD: ABNORMAL
EOSINOPHIL # BLD AUTO: 0.1 K/UL (ref 0–0.5)
EOSINOPHIL NFR BLD: 1.9 % (ref 0–8)
ERYTHROCYTE [DISTWIDTH] IN BLOOD BY AUTOMATED COUNT: 13.2 % (ref 11.5–14.5)
ERYTHROCYTE [SEDIMENTATION RATE] IN BLOOD BY WESTERGREN METHOD: 16 MM/HR (ref 0–20)
EST. GFR  (AFRICAN AMERICAN): >60 ML/MIN/1.73 M^2
EST. GFR  (NON AFRICAN AMERICAN): >60 ML/MIN/1.73 M^2
GLUCOSE SERPL-MCNC: 96 MG/DL (ref 70–110)
HCT VFR BLD AUTO: 36.8 % (ref 37–48.5)
HGB BLD-MCNC: 11.4 G/DL (ref 12–16)
IMM GRANULOCYTES # BLD AUTO: 0.02 K/UL (ref 0–0.04)
IMM GRANULOCYTES NFR BLD AUTO: 0.3 % (ref 0–0.5)
LYMPHOCYTES # BLD AUTO: 1.7 K/UL (ref 1–4.8)
LYMPHOCYTES NFR BLD: 25.6 % (ref 18–48)
MCH RBC QN AUTO: 28.3 PG (ref 27–31)
MCHC RBC AUTO-ENTMCNC: 31 G/DL (ref 32–36)
MCV RBC AUTO: 91 FL (ref 82–98)
MONOCYTES # BLD AUTO: 0.7 K/UL (ref 0.3–1)
MONOCYTES NFR BLD: 9.9 % (ref 4–15)
NEUTROPHILS # BLD AUTO: 4.2 K/UL (ref 1.8–7.7)
NEUTROPHILS NFR BLD: 61.9 % (ref 38–73)
NRBC BLD-RTO: 0 /100 WBC
PLATELET # BLD AUTO: 259 K/UL (ref 150–350)
PMV BLD AUTO: 10.6 FL (ref 9.2–12.9)
POTASSIUM SERPL-SCNC: 3.3 MMOL/L (ref 3.5–5.1)
PROT SERPL-MCNC: 6.6 G/DL (ref 6–8.4)
RBC # BLD AUTO: 4.03 M/UL (ref 4–5.4)
SODIUM SERPL-SCNC: 141 MMOL/L (ref 136–145)
WBC # BLD AUTO: 6.79 K/UL (ref 3.9–12.7)

## 2019-10-21 PROCEDURE — 85025 COMPLETE CBC W/AUTO DIFF WBC: CPT

## 2019-10-21 PROCEDURE — 85651 RBC SED RATE NONAUTOMATED: CPT | Mod: PO

## 2019-10-21 PROCEDURE — 36415 COLL VENOUS BLD VENIPUNCTURE: CPT | Mod: PO

## 2019-10-21 PROCEDURE — 80053 COMPREHEN METABOLIC PANEL: CPT

## 2019-10-21 PROCEDURE — 86140 C-REACTIVE PROTEIN: CPT

## 2019-10-24 ENCOUNTER — PATIENT MESSAGE (OUTPATIENT)
Dept: RHEUMATOLOGY | Facility: CLINIC | Age: 74
End: 2019-10-24

## 2019-11-09 ENCOUNTER — PATIENT MESSAGE (OUTPATIENT)
Dept: RHEUMATOLOGY | Facility: CLINIC | Age: 74
End: 2019-11-09

## 2019-11-11 ENCOUNTER — PATIENT MESSAGE (OUTPATIENT)
Dept: RHEUMATOLOGY | Facility: CLINIC | Age: 74
End: 2019-11-11

## 2019-11-15 ENCOUNTER — OFFICE VISIT (OUTPATIENT)
Dept: RHEUMATOLOGY | Facility: CLINIC | Age: 74
End: 2019-11-15
Payer: MEDICARE

## 2019-11-15 VITALS
HEIGHT: 63 IN | SYSTOLIC BLOOD PRESSURE: 160 MMHG | DIASTOLIC BLOOD PRESSURE: 85 MMHG | HEART RATE: 60 BPM | BODY MASS INDEX: 28.9 KG/M2 | WEIGHT: 163.13 LBS

## 2019-11-15 DIAGNOSIS — M45.9 ANKYLOSING SPONDYLITIS, UNSPECIFIED SITE OF SPINE: Primary | ICD-10-CM

## 2019-11-15 DIAGNOSIS — F32.A DEPRESSION, UNSPECIFIED DEPRESSION TYPE: ICD-10-CM

## 2019-11-15 DIAGNOSIS — I10 ESSENTIAL HYPERTENSION: ICD-10-CM

## 2019-11-15 DIAGNOSIS — K21.9 GASTROESOPHAGEAL REFLUX DISEASE, ESOPHAGITIS PRESENCE NOT SPECIFIED: ICD-10-CM

## 2019-11-15 DIAGNOSIS — E55.9 VITAMIN D DEFICIENCY: ICD-10-CM

## 2019-11-15 PROCEDURE — 3079F PR MOST RECENT DIASTOLIC BLOOD PRESSURE 80-89 MM HG: ICD-10-PCS | Mod: CPTII,S$GLB,, | Performed by: PHYSICIAN ASSISTANT

## 2019-11-15 PROCEDURE — 99999 PR PBB SHADOW E&M-EST. PATIENT-LVL III: ICD-10-PCS | Mod: PBBFAC,,, | Performed by: PHYSICIAN ASSISTANT

## 2019-11-15 PROCEDURE — 1101F PT FALLS ASSESS-DOCD LE1/YR: CPT | Mod: CPTII,S$GLB,, | Performed by: PHYSICIAN ASSISTANT

## 2019-11-15 PROCEDURE — 3077F SYST BP >= 140 MM HG: CPT | Mod: CPTII,S$GLB,, | Performed by: PHYSICIAN ASSISTANT

## 2019-11-15 PROCEDURE — 3077F PR MOST RECENT SYSTOLIC BLOOD PRESSURE >= 140 MM HG: ICD-10-PCS | Mod: CPTII,S$GLB,, | Performed by: PHYSICIAN ASSISTANT

## 2019-11-15 PROCEDURE — 99214 OFFICE O/P EST MOD 30 MIN: CPT | Mod: S$GLB,,, | Performed by: PHYSICIAN ASSISTANT

## 2019-11-15 PROCEDURE — 99214 PR OFFICE/OUTPT VISIT, EST, LEVL IV, 30-39 MIN: ICD-10-PCS | Mod: S$GLB,,, | Performed by: PHYSICIAN ASSISTANT

## 2019-11-15 PROCEDURE — 1101F PR PT FALLS ASSESS DOC 0-1 FALLS W/OUT INJ PAST YR: ICD-10-PCS | Mod: CPTII,S$GLB,, | Performed by: PHYSICIAN ASSISTANT

## 2019-11-15 PROCEDURE — 99999 PR PBB SHADOW E&M-EST. PATIENT-LVL III: CPT | Mod: PBBFAC,,, | Performed by: PHYSICIAN ASSISTANT

## 2019-11-15 PROCEDURE — 3079F DIAST BP 80-89 MM HG: CPT | Mod: CPTII,S$GLB,, | Performed by: PHYSICIAN ASSISTANT

## 2019-11-15 RX ORDER — DORZOLAMIDE HYDROCHLORIDE AND TIMOLOL MALEATE 20; 5 MG/ML; MG/ML
1 SOLUTION/ DROPS OPHTHALMIC DAILY
COMMUNITY
Start: 2019-11-04

## 2019-11-15 RX ORDER — FAMOTIDINE 20 MG/1
20 TABLET, FILM COATED ORAL 2 TIMES DAILY
Qty: 180 TABLET | Refills: 1 | Status: SHIPPED | OUTPATIENT
Start: 2019-11-15 | End: 2021-02-18

## 2019-11-15 NOTE — PATIENT INSTRUCTIONS
START FAMOTIDINE 20 MG TWICE DAILY  START OTC IBUPROFEN 400-600 MG 2-3 TIMES DAILY AS NEEDED    CALL BACK OR EMAIL NAME OF ANTIDEPRESSANT AND WHAT PHARMACY IS PREFERRED.    CONSIDER PHYSICAL THERAPY FOR NECK AND HIP PAIN    MONITOR BLOOD PRESSURE

## 2019-11-16 ENCOUNTER — PATIENT MESSAGE (OUTPATIENT)
Dept: RHEUMATOLOGY | Facility: CLINIC | Age: 74
End: 2019-11-16

## 2019-11-18 ASSESSMENT — ROUTINE ASSESSMENT OF PATIENT INDEX DATA (RAPID3)
PSYCHOLOGICAL DISTRESS SCORE: 6.6
TOTAL RAPID3 SCORE: 6.33
MDHAQ FUNCTION SCORE: 1.8
FATIGUE SCORE: 2.2
PAIN SCORE: 6.5
PATIENT GLOBAL ASSESSMENT SCORE: 6.5

## 2019-11-18 NOTE — PROGRESS NOTES
Subjective:       Patient ID: Pauline Rodriguez is a 74 y.o. female.    Chief Complaint: Disease Management and Pain    Mrs. Rodriguez is a 74 year old female who presents to clinic for follow up on ankylosing spondylitis. She is a new patient to me. She has been doing poorly since her last visit. She complains of severe pain involving her R hip that radiates to the groin. X-ray showed severe OA, but she is not interested in Ortho eval or any injections or intervention. She also complains of bilateral hand numbness. She complains of pain involving her neck, low back, ankles and toes. She is unable to stand for more than a few minutes to due pain. Duexis is not helpful, but OTC ibuprofen is helpful. She complains of anhedonia, low mood, and tearfulness. She does not want to leave her house. No SI/HI. Previously treated with antidepressant years ago and wants to resume this.     Review of Systems   Constitutional: Positive for activity change. Negative for appetite change, chills, fatigue and fever.   Eyes: Negative for visual disturbance.   Respiratory: Negative for cough and shortness of breath.    Cardiovascular: Negative for chest pain, palpitations and leg swelling.   Gastrointestinal: Negative for abdominal pain, constipation, diarrhea, nausea and vomiting.   Musculoskeletal: Positive for arthralgias, back pain, gait problem, joint swelling, myalgias, neck pain and neck stiffness.   Neurological: Positive for numbness. Negative for dizziness, weakness, light-headedness and headaches.   Psychiatric/Behavioral: Positive for dysphoric mood. The patient is nervous/anxious.          Objective:     Vitals:    11/15/19 1352   BP: (!) 160/85   Pulse: 60       Past Medical History:   Diagnosis Date    Essential hypertension      Past Surgical History:   Procedure Laterality Date    HYSTERECTOMY            Physical Exam   Constitutional: She is well-developed, well-nourished, and in no distress.   Eyes: Right conjunctiva is not  injected. Left conjunctiva is not injected. Right eye exhibits normal extraocular motion. Left eye exhibits normal extraocular motion.   Neck: No JVD present. Muscular tenderness present. No spinous process tenderness present. Decreased range of motion present. No thyromegaly present.   Cardiovascular: Normal rate and regular rhythm.  Exam reveals no decreased pulses.    Pulmonary/Chest: She has no wheezes. She has no rhonchi. She has no rales.       Right Side Rheumatological Exam     Examination finds the shoulder, elbow, wrist and knee normal.    The patient is tender to palpation of the 1st PIP, 1st MCP, 2nd PIP, 2nd MCP, 3rd PIP, 3rd MCP, 4th PIP, 4th MCP, 5th PIP and 5th MCP    The patient has an enlarged 2nd DIP, 3rd DIP, 4th DIP and 5th DIP    Left Side Rheumatological Exam     Examination finds the shoulder, elbow, wrist and knee normal.    The patient is tender to palpation of the 1st PIP, 1st MCP, 2nd PIP, 2nd MCP, 3rd PIP, 3rd MCP, 4th PIP, 4th MCP, 5th PIP and 5th MCP.    The patient has an enlarged 2nd DIP, 3rd DIP, 4th DIP and 5th DIP.      Lymphadenopathy:     She has no cervical adenopathy.   Neurological: Gait normal.   Negative phalen maneuver.    Skin: No rash noted.     Psychiatric: Affect normal. Her mood appears anxious. She exhibits a depressed mood.       Recent labs reviewed:  Component      Latest Ref Rng & Units 10/21/2019 5/16/2019   WBC      3.90 - 12.70 K/uL 6.79    RBC      4.00 - 5.40 M/uL 4.03    Hemoglobin      12.0 - 16.0 g/dL 11.4 (L)    Hematocrit      37.0 - 48.5 % 36.8 (L)    MCV      82 - 98 fL 91    MCH      27.0 - 31.0 pg 28.3    MCHC      32.0 - 36.0 g/dL 31.0 (L)    RDW      11.5 - 14.5 % 13.2    Platelets      150 - 350 K/uL 259    MPV      9.2 - 12.9 fL 10.6    Immature Granulocytes      0.0 - 0.5 % 0.3    Gran # (ANC)      1.8 - 7.7 K/uL 4.2    Immature Grans (Abs)      0.00 - 0.04 K/uL 0.02    Lymph #      1.0 - 4.8 K/uL 1.7    Mono #      0.3 - 1.0 K/uL 0.7    Eos  #      0.0 - 0.5 K/uL 0.1    Baso #      0.00 - 0.20 K/uL 0.03    nRBC      0 /100 WBC 0    Gran%      38.0 - 73.0 % 61.9    Lymph%      18.0 - 48.0 % 25.6    Mono%      4.0 - 15.0 % 9.9    Eosinophil%      0.0 - 8.0 % 1.9    Basophil%      0.0 - 1.9 % 0.4    Differential Method       Automated    Sodium      136 - 145 mmol/L 141    Potassium      3.5 - 5.1 mmol/L 3.3 (L)    Chloride      95 - 110 mmol/L 104    CO2      23 - 29 mmol/L 29    Glucose      70 - 110 mg/dL 96    BUN, Bld      8 - 23 mg/dL 22    Creatinine      0.5 - 1.4 mg/dL 0.8    Calcium      8.7 - 10.5 mg/dL 9.3    PROTEIN TOTAL      6.0 - 8.4 g/dL 6.6    Albumin      3.5 - 5.2 g/dL 3.7    BILIRUBIN TOTAL      0.1 - 1.0 mg/dL 0.9    Alkaline Phosphatase      55 - 135 U/L 95    AST      10 - 40 U/L 17    ALT      10 - 44 U/L 10    Anion Gap      8 - 16 mmol/L 8    eGFR if African American      >60 mL/min/1.73 m:2 >60.0    eGFR if non African American      >60 mL/min/1.73 m:2 >60.0    B27 Testing Date        05/24/2019 12:00 AM   HLA B27 Result        Negative   Vit D, 25-Hydroxy      30 - 96 ng/mL  11 (L)   CRP      0.0 - 8.2 mg/L 6.2    Sed Rate      0 - 20 mm/Hr 16      Assessment:       1. Ankylosing spondylitis, unspecified site of spine    2. Gastroesophageal reflux disease, esophagitis presence not specified    3. Essential hypertension    4. Depression, unspecified depression type    5. Vitamin D deficiency             Plan:       Ankylosing spondylitis, unspecified site of spine    Gastroesophageal reflux disease, esophagitis presence not specified  -     famotidine (PEPCID) 20 MG tablet; Take 1 tablet (20 mg total) by mouth 2 (two) times daily.  Dispense: 180 tablet; Refill: 1    Essential hypertension    Depression, unspecified depression type    Vitamin D deficiency         Assessment:  74 year old female with  Ankylosing spondylitis  --uncontrolled hypertension  --uncontrolled depression  --vitamin D deficiency    Plan:  1. D/C duexis.  Start OTC ibuprofen 400-600 mg 2-3 times daily PRN. Add famotidine 20 mg BID. Consider plaquenil in the future if no improvement on NSAIDs. Pt is hesitant to take any immunomodulating medications.  2. Consider PT neck and hip pain  3. Pt will call back with name of antidepressant she has tolerated well in the past. Recommended counseling, pt declined.  4. Monitor BP at home and keep a log. Notify clinic if BP is persistently >140/90.  Follow up with PCP for HTN control    Follow up:  3 months

## 2019-11-21 ENCOUNTER — TELEPHONE (OUTPATIENT)
Dept: RHEUMATOLOGY | Facility: CLINIC | Age: 74
End: 2019-11-21

## 2019-11-21 NOTE — TELEPHONE ENCOUNTER
----- Message from Guerline Traore PA-C sent at 11/18/2019  4:06 PM CST -----  Pt changed her mind regarding starting an antidepressant.

## 2019-11-26 ENCOUNTER — PATIENT MESSAGE (OUTPATIENT)
Dept: RHEUMATOLOGY | Facility: CLINIC | Age: 74
End: 2019-11-26

## 2019-12-18 ENCOUNTER — PATIENT MESSAGE (OUTPATIENT)
Dept: RHEUMATOLOGY | Facility: CLINIC | Age: 74
End: 2019-12-18

## 2019-12-23 NOTE — TELEPHONE ENCOUNTER
I would recommend that she stop duexis given her side effects. Regarding the restasis as a cause, I would recommend she call the prescribing physician to see if they have experienced this as a side effects. If not, and she continues to have ringing in her ear then I would recommend she seen an Audiologist/ENT for evaluation. Typically, those visits are set up together at [facility]. Alternative option would be to see her primary care physician to discuss further.

## 2020-01-08 ENCOUNTER — TELEPHONE (OUTPATIENT)
Dept: RHEUMATOLOGY | Facility: CLINIC | Age: 75
End: 2020-01-08

## 2020-01-08 NOTE — TELEPHONE ENCOUNTER
----- Message from aHrika Chadwick sent at 1/8/2020 10:49 AM CST -----  Contact: Susannah  from [facility] phone  [phone] eXT [phone]   Carolina from [facility] called with case number [id number] she is calling on behalf of the patient who informed she had a a side effect to Duexis 1 tab 3 times a day. Please call back [phone] Ext [phone]

## 2020-01-08 NOTE — TELEPHONE ENCOUNTER
Returned Carolina call regarding side effects that were reported by patient on 12-26-19. Carolina informed office that patient reported having gas for two weeks after taking duexis. Nurse informed patient was seen in office on 11-15-19 and was advised to stop taking duexis due to patient reporting it was not helping. Patient profile was up dated regarding side effects to duexis.

## 2020-03-13 ENCOUNTER — PATIENT MESSAGE (OUTPATIENT)
Dept: RHEUMATOLOGY | Facility: CLINIC | Age: 75
End: 2020-03-13

## 2020-04-03 ENCOUNTER — PATIENT MESSAGE (OUTPATIENT)
Dept: RHEUMATOLOGY | Facility: CLINIC | Age: 75
End: 2020-04-03

## 2020-04-03 DIAGNOSIS — R76.8 POSITIVE ANA (ANTINUCLEAR ANTIBODY): ICD-10-CM

## 2020-04-03 DIAGNOSIS — M45.9 ANKYLOSING SPONDYLITIS, UNSPECIFIED SITE OF SPINE: ICD-10-CM

## 2020-04-03 DIAGNOSIS — E55.9 VITAMIN D DEFICIENCY: Primary | ICD-10-CM

## 2020-05-19 ENCOUNTER — PATIENT MESSAGE (OUTPATIENT)
Dept: RHEUMATOLOGY | Facility: CLINIC | Age: 75
End: 2020-05-19

## 2020-05-19 DIAGNOSIS — M25.551 PAIN OF RIGHT HIP JOINT: Primary | ICD-10-CM

## 2020-05-20 ENCOUNTER — PATIENT MESSAGE (OUTPATIENT)
Dept: RHEUMATOLOGY | Facility: CLINIC | Age: 75
End: 2020-05-20

## 2020-05-21 ENCOUNTER — PATIENT MESSAGE (OUTPATIENT)
Dept: RHEUMATOLOGY | Facility: CLINIC | Age: 75
End: 2020-05-21

## 2020-05-21 NOTE — TELEPHONE ENCOUNTER
Symptoms sound suspicious for hip osteoarthritis. X-ray of her hips last year showed severe arthritis on the R side. I have placed ortho referral.

## 2020-06-08 ENCOUNTER — LAB VISIT (OUTPATIENT)
Dept: LAB | Facility: HOSPITAL | Age: 75
End: 2020-06-08
Attending: INTERNAL MEDICINE
Payer: MEDICARE

## 2020-06-08 DIAGNOSIS — E55.9 VITAMIN D DEFICIENCY: ICD-10-CM

## 2020-06-08 DIAGNOSIS — M45.9 ANKYLOSING SPONDYLITIS, UNSPECIFIED SITE OF SPINE: ICD-10-CM

## 2020-06-08 DIAGNOSIS — R76.8 POSITIVE ANA (ANTINUCLEAR ANTIBODY): ICD-10-CM

## 2020-06-08 LAB
25(OH)D3+25(OH)D2 SERPL-MCNC: 12 NG/ML (ref 30–96)
ALBUMIN SERPL BCP-MCNC: 4.1 G/DL (ref 3.5–5.2)
ALP SERPL-CCNC: 100 U/L (ref 55–135)
ALT SERPL W/O P-5'-P-CCNC: 11 U/L (ref 10–44)
ANION GAP SERPL CALC-SCNC: 8 MMOL/L (ref 8–16)
AST SERPL-CCNC: 17 U/L (ref 10–40)
BASOPHILS # BLD AUTO: 0.04 K/UL (ref 0–0.2)
BASOPHILS NFR BLD: 0.6 % (ref 0–1.9)
BILIRUB SERPL-MCNC: 1 MG/DL (ref 0.1–1)
BUN SERPL-MCNC: 17 MG/DL (ref 8–23)
CALCIUM SERPL-MCNC: 9.4 MG/DL (ref 8.7–10.5)
CHLORIDE SERPL-SCNC: 104 MMOL/L (ref 95–110)
CO2 SERPL-SCNC: 28 MMOL/L (ref 23–29)
CREAT SERPL-MCNC: 0.8 MG/DL (ref 0.5–1.4)
CRP SERPL-MCNC: 4.8 MG/L (ref 0–8.2)
DIFFERENTIAL METHOD: ABNORMAL
EOSINOPHIL # BLD AUTO: 0.2 K/UL (ref 0–0.5)
EOSINOPHIL NFR BLD: 2.2 % (ref 0–8)
ERYTHROCYTE [DISTWIDTH] IN BLOOD BY AUTOMATED COUNT: 13.2 % (ref 11.5–14.5)
ERYTHROCYTE [SEDIMENTATION RATE] IN BLOOD BY WESTERGREN METHOD: 18 MM/HR (ref 0–20)
EST. GFR  (AFRICAN AMERICAN): >60 ML/MIN/1.73 M^2
EST. GFR  (NON AFRICAN AMERICAN): >60 ML/MIN/1.73 M^2
GLUCOSE SERPL-MCNC: 137 MG/DL (ref 70–110)
HCT VFR BLD AUTO: 36.8 % (ref 37–48.5)
HGB BLD-MCNC: 11.4 G/DL (ref 12–16)
IMM GRANULOCYTES # BLD AUTO: 0.02 K/UL (ref 0–0.04)
IMM GRANULOCYTES NFR BLD AUTO: 0.3 % (ref 0–0.5)
LYMPHOCYTES # BLD AUTO: 1.5 K/UL (ref 1–4.8)
LYMPHOCYTES NFR BLD: 21.3 % (ref 18–48)
MCH RBC QN AUTO: 29.5 PG (ref 27–31)
MCHC RBC AUTO-ENTMCNC: 31 G/DL (ref 32–36)
MCV RBC AUTO: 95 FL (ref 82–98)
MONOCYTES # BLD AUTO: 0.5 K/UL (ref 0.3–1)
MONOCYTES NFR BLD: 7.5 % (ref 4–15)
NEUTROPHILS # BLD AUTO: 4.7 K/UL (ref 1.8–7.7)
NEUTROPHILS NFR BLD: 68.1 % (ref 38–73)
NRBC BLD-RTO: 0 /100 WBC
PLATELET # BLD AUTO: 273 K/UL (ref 150–350)
PMV BLD AUTO: 10.6 FL (ref 9.2–12.9)
POTASSIUM SERPL-SCNC: 3.5 MMOL/L (ref 3.5–5.1)
PROT SERPL-MCNC: 7.1 G/DL (ref 6–8.4)
RBC # BLD AUTO: 3.86 M/UL (ref 4–5.4)
SODIUM SERPL-SCNC: 140 MMOL/L (ref 136–145)
WBC # BLD AUTO: 6.89 K/UL (ref 3.9–12.7)

## 2020-06-08 PROCEDURE — 86140 C-REACTIVE PROTEIN: CPT

## 2020-06-08 PROCEDURE — 86039 ANTINUCLEAR ANTIBODIES (ANA): CPT

## 2020-06-08 PROCEDURE — 82306 VITAMIN D 25 HYDROXY: CPT

## 2020-06-08 PROCEDURE — 85025 COMPLETE CBC W/AUTO DIFF WBC: CPT

## 2020-06-08 PROCEDURE — 80053 COMPREHEN METABOLIC PANEL: CPT

## 2020-06-08 PROCEDURE — 36415 COLL VENOUS BLD VENIPUNCTURE: CPT | Mod: PO

## 2020-06-08 PROCEDURE — 85651 RBC SED RATE NONAUTOMATED: CPT | Mod: PO

## 2020-06-08 PROCEDURE — 86038 ANTINUCLEAR ANTIBODIES: CPT

## 2020-06-08 PROCEDURE — 86235 NUCLEAR ANTIGEN ANTIBODY: CPT | Mod: 59

## 2020-06-10 LAB
ANA PATTERN 1: NORMAL
ANA SER QL IF: POSITIVE
ANA TITR SER IF: NORMAL {TITER}
ANTI SM ANTIBODY: 0.12 RATIO (ref 0–0.99)
ANTI SM/RNP ANTIBODY: 0.1 RATIO (ref 0–0.99)
ANTI-SM INTERPRETATION: NEGATIVE
ANTI-SM/RNP INTERPRETATION: NEGATIVE
ANTI-SSA ANTIBODY: 0.08 RATIO (ref 0–0.99)
ANTI-SSA INTERPRETATION: NEGATIVE
ANTI-SSB ANTIBODY: 0.06 RATIO (ref 0–0.99)
ANTI-SSB INTERPRETATION: NEGATIVE
DSDNA AB SER-ACNC: NORMAL [IU]/ML

## 2020-06-15 ENCOUNTER — OFFICE VISIT (OUTPATIENT)
Dept: ORTHOPEDICS | Facility: CLINIC | Age: 75
End: 2020-06-15
Payer: MEDICARE

## 2020-06-15 VITALS — BODY MASS INDEX: 27.85 KG/M2 | HEIGHT: 64 IN | WEIGHT: 163.13 LBS

## 2020-06-15 DIAGNOSIS — M25.551 RIGHT HIP PAIN: Primary | ICD-10-CM

## 2020-06-15 PROCEDURE — 99999 PR PBB SHADOW E&M-EST. PATIENT-LVL III: ICD-10-PCS | Mod: PBBFAC,,, | Performed by: ORTHOPAEDIC SURGERY

## 2020-06-15 PROCEDURE — 1125F PR PAIN SEVERITY QUANTIFIED, PAIN PRESENT: ICD-10-PCS | Mod: S$GLB,,, | Performed by: ORTHOPAEDIC SURGERY

## 2020-06-15 PROCEDURE — 1125F AMNT PAIN NOTED PAIN PRSNT: CPT | Mod: S$GLB,,, | Performed by: ORTHOPAEDIC SURGERY

## 2020-06-15 PROCEDURE — 99203 OFFICE O/P NEW LOW 30 MIN: CPT | Mod: S$GLB,,, | Performed by: ORTHOPAEDIC SURGERY

## 2020-06-15 PROCEDURE — 99203 PR OFFICE/OUTPT VISIT, NEW, LEVL III, 30-44 MIN: ICD-10-PCS | Mod: S$GLB,,, | Performed by: ORTHOPAEDIC SURGERY

## 2020-06-15 PROCEDURE — 99999 PR PBB SHADOW E&M-EST. PATIENT-LVL III: CPT | Mod: PBBFAC,,, | Performed by: ORTHOPAEDIC SURGERY

## 2020-06-15 PROCEDURE — 1101F PT FALLS ASSESS-DOCD LE1/YR: CPT | Mod: CPTII,S$GLB,, | Performed by: ORTHOPAEDIC SURGERY

## 2020-06-15 PROCEDURE — 1101F PR PT FALLS ASSESS DOC 0-1 FALLS W/OUT INJ PAST YR: ICD-10-PCS | Mod: CPTII,S$GLB,, | Performed by: ORTHOPAEDIC SURGERY

## 2020-06-15 PROCEDURE — 1159F MED LIST DOCD IN RCRD: CPT | Mod: S$GLB,,, | Performed by: ORTHOPAEDIC SURGERY

## 2020-06-15 PROCEDURE — 1159F PR MEDICATION LIST DOCUMENTED IN MEDICAL RECORD: ICD-10-PCS | Mod: S$GLB,,, | Performed by: ORTHOPAEDIC SURGERY

## 2020-06-15 NOTE — LETTER
Shannon 15, 2020      Guerline Traore PA-C  [address] [facility] [address] [place] [address]           [facility] [place]  [address] [place] [address]  Phone: [phone]          Patient: Pauline Rodriguez   MR Number: [id number]   YOB: 1945   Date of Visit: 6/15/2020       Dear Guerline Traore:    Thank you for referring Pauline Rodriguez to me for evaluation. Attached you will find relevant portions of my assessment and plan of care.    If you have questions, please do not hesitate to call me. I look forward to following Pauline Rodriguez along with you.    Sincerely,    Russell Shaw MD    Enclosure  CC:  No Recipients    If you would like to receive this communication electronically, please contact [email or web address] or [phone] to request more information on [employer] access.    For providers and/or their staff who would like to refer a patient to [facility], please contact us through our one-stop-shop provider referral line, [facility] , at [phone].    If you feel you have received this communication in error or would no longer like to receive these types of communications, please e-mail [email or web address]

## 2020-06-15 NOTE — PROGRESS NOTES
74 years old complaining of pain in her right hip which has had now for years.  Come on gradually.  No trauma.  Pain is 5/10 pain scale.  Most bothersome if she does stand after being seated.  She points to the groin as location of her pain    Exam shows internal and external rotation about the hip reproduce her symptoms, skin is intact, compartments are soft, she is tender in the lumbar spine as well    X-rays show arthritic changes of the hip and the lumbar spine    Assessment:  Right hip arthritis, lumbar spine arthritis    Plan:  At this point she wants to manage this on her own, we did give her the option of fluoroscopic guidance intra-articular injection into the right hip, follow-up as needed    Further History  Aching pain  Worse with activity  Relieved with rest  No other associated symptoms  No other radiation    Further Exam  Alert and oriented  Pleasant  Contralateral limb has appropriate range of motion for age and condition  Contralateral limb has appropriate strength for age and condition  Contralateral limb has appropriate stability  for age and condition  No adenopathy  Pulses are appropriate for current condition  Skin is intact        Chief Complaint    Chief Complaint   Patient presents with    Right Hip - Pain       HPI  Pauline Rodriguez is a 74 y.o.  female who presents with       Past Medical History  Past Medical History:   Diagnosis Date    Essential hypertension        Past Surgical History  Past Surgical History:   Procedure Laterality Date    HYSTERECTOMY         Medications  Current Outpatient Medications   Medication Sig    benazepril (LOTENSIN) 20 MG tablet Take 1 tablet (20 mg total) by mouth once daily.    dorzolamide-timolol 2-0.5% (COSOPT) 22.3-6.8 mg/mL ophthalmic solution     hydrochlorothiazide (HYDRODIURIL) 25 MG tablet Take 1 tablet (25 mg total) by mouth once daily.    latanoprost 0.005 % ophthalmic solution     famotidine (PEPCID) 20 MG tablet Take 1 tablet (20 mg  total) by mouth 2 (two) times daily.    timolol maleate 0.5% (TIMOPTIC) 0.5 % Drop Place 1 drop into both eyes once daily.      No current facility-administered medications for this visit.        Allergies  Review of patient's allergies indicates:  No Known Allergies    Family History  Family History   Problem Relation Age of Onset    Diabetes Maternal Aunt        Social History  Social History     Socioeconomic History    Marital status:      Spouse name: Not on file    Number of children: Not on file    Years of education: Not on file    Highest education level: Not on file   Occupational History    Not on file   Social Needs    Financial resource strain: Not on file    Food insecurity     Worry: Not on file     Inability: Not on file    Transportation needs     Medical: Not on file     Non-medical: Not on file   Tobacco Use    Smoking status: Never Smoker    Smokeless tobacco: Never Used   Substance and Sexual Activity    Alcohol use: No    Drug use: Not on file    Sexual activity: Not on file   Lifestyle    Physical activity     Days per week: Not on file     Minutes per session: Not on file    Stress: Not on file   Relationships    Social connections     Talks on phone: Not on file     Gets together: Not on file     Attends Zoroastrianism service: Not on file     Active member of club or organization: Not on file     Attends meetings of clubs or organizations: Not on file     Relationship status: Not on file   Other Topics Concern    Not on file   Social History Narrative    Not on file               Review of Systems     Constitutional: Negative    HENT: Negative  Eyes: Negative  Respiratory: Negative  Cardiovascular: Negative  Musculoskeletal: HPI  Skin: Negative  Neurological: Negative  Hematological: Negative  Endocrine: Negative                 Physical Exam    There were no vitals filed for this visit.  Body mass index is 28 kg/m².  Physical Examination:     General appearance -   well appearing, and in no distress  Mental status - awake  Neck - supple  Chest -  symmetric air entry  Heart - normal rate   Abdomen - soft      Assessment     1. Right hip pain          Plan

## 2020-06-16 ENCOUNTER — TELEPHONE (OUTPATIENT)
Dept: ORTHOPEDICS | Facility: CLINIC | Age: 75
End: 2020-06-16

## 2020-06-16 NOTE — TELEPHONE ENCOUNTER
Returned kathleen's call back with no answer unable to leave a message, called patient number in chart and there was no answer   ----- Message from Michael Erwin sent at 6/16/2020 10:43 AM CDT -----  Regarding: Patient in pain  Contact: Kathleen  -  [phone]  Needs help after drs manipulation yesterday

## 2020-06-22 ENCOUNTER — HOSPITAL ENCOUNTER (OUTPATIENT)
Dept: RADIOLOGY | Facility: HOSPITAL | Age: 75
Discharge: HOME | End: 2020-06-22
Attending: ORTHOPAEDIC SURGERY
Payer: MEDICARE

## 2020-06-22 DIAGNOSIS — M25.551 RIGHT HIP PAIN: ICD-10-CM

## 2020-06-22 PROCEDURE — 77002 NEEDLE LOCALIZATION BY XRAY: CPT | Mod: 26,,, | Performed by: RADIOLOGY

## 2020-06-22 PROCEDURE — 25500020 PHARM REV CODE 255: Mod: PO | Performed by: ORTHOPAEDIC SURGERY

## 2020-06-22 PROCEDURE — 77002 NEEDLE LOCALIZATION BY XRAY: CPT | Mod: TC,PO

## 2020-06-22 PROCEDURE — 20610 DRAIN/INJ JOINT/BURSA W/O US: CPT | Mod: RT,,, | Performed by: RADIOLOGY

## 2020-06-22 PROCEDURE — 77002 IR ASPIRATION INJECTION LARGE JOINT W FLUORO: ICD-10-PCS | Mod: 26,,, | Performed by: RADIOLOGY

## 2020-06-22 PROCEDURE — 63600175 PHARM REV CODE 636 W HCPCS: Mod: PO | Performed by: ORTHOPAEDIC SURGERY

## 2020-06-22 PROCEDURE — 20610 IR ASPIRATION INJECTION LARGE JOINT W FLUORO: ICD-10-PCS | Mod: RT,,, | Performed by: RADIOLOGY

## 2020-06-22 PROCEDURE — 25000003 PHARM REV CODE 250: Mod: PO | Performed by: ORTHOPAEDIC SURGERY

## 2020-06-22 RX ORDER — BUPIVACAINE HYDROCHLORIDE 2.5 MG/ML
10 INJECTION, SOLUTION EPIDURAL; INFILTRATION; INTRACAUDAL ONCE
Status: COMPLETED | OUTPATIENT
Start: 2020-06-22 | End: 2020-06-22

## 2020-06-22 RX ADMIN — BUPIVACAINE HYDROCHLORIDE 25 MG: 2.5 INJECTION, SOLUTION EPIDURAL; INFILTRATION; INTRACAUDAL; PERINEURAL at 01:06

## 2020-06-22 RX ADMIN — IOHEXOL 20 ML: 240 INJECTION, SOLUTION INTRATHECAL; INTRAVASCULAR; INTRAVENOUS; ORAL at 01:06

## 2020-06-22 RX ADMIN — TRIAMCINOLONE ACETONIDE 5 MG: 10 INJECTION, SUSPENSION INTRA-ARTICULAR; INTRALESIONAL at 01:06

## 2020-09-18 ENCOUNTER — PATIENT MESSAGE (OUTPATIENT)
Dept: RHEUMATOLOGY | Facility: CLINIC | Age: 75
End: 2020-09-18

## 2020-09-18 NOTE — TELEPHONE ENCOUNTER
It would ideal if she could see Dr. Hester since it's been over a year since she last evaluated her and she specially requested her in the message. Can you add her to the wait list if someone falls off the schedule for next week?

## 2021-01-14 ENCOUNTER — PATIENT MESSAGE (OUTPATIENT)
Dept: RHEUMATOLOGY | Facility: CLINIC | Age: 76
End: 2021-01-14

## 2021-02-18 ENCOUNTER — DOCUMENTATION ONLY (OUTPATIENT)
Dept: RHEUMATOLOGY | Facility: CLINIC | Age: 76
End: 2021-02-18

## 2021-02-18 ENCOUNTER — HOSPITAL ENCOUNTER (OUTPATIENT)
Dept: RADIOLOGY | Facility: HOSPITAL | Age: 76
Discharge: HOME | End: 2021-02-18
Attending: INTERNAL MEDICINE
Payer: MEDICARE

## 2021-02-18 ENCOUNTER — TELEPHONE (OUTPATIENT)
Dept: ORTHOPEDICS | Facility: CLINIC | Age: 76
End: 2021-02-18

## 2021-02-18 ENCOUNTER — OFFICE VISIT (OUTPATIENT)
Dept: RHEUMATOLOGY | Facility: CLINIC | Age: 76
End: 2021-02-18
Payer: MEDICARE

## 2021-02-18 VITALS
SYSTOLIC BLOOD PRESSURE: 160 MMHG | DIASTOLIC BLOOD PRESSURE: 90 MMHG | BODY MASS INDEX: 26.29 KG/M2 | HEART RATE: 70 BPM | WEIGHT: 154 LBS | HEIGHT: 64 IN

## 2021-02-18 DIAGNOSIS — R93.7 ABNORMAL THREE VIEW X-RAY OF CERVICAL SPINE: Primary | ICD-10-CM

## 2021-02-18 DIAGNOSIS — F32.A DEPRESSION, UNSPECIFIED DEPRESSION TYPE: ICD-10-CM

## 2021-02-18 DIAGNOSIS — M25.552 BILATERAL HIP PAIN: ICD-10-CM

## 2021-02-18 DIAGNOSIS — E55.9 VITAMIN D DEFICIENCY: ICD-10-CM

## 2021-02-18 DIAGNOSIS — E16.1 OTHER HYPOGLYCEMIA: ICD-10-CM

## 2021-02-18 DIAGNOSIS — M47.819 SPONDYLARTHRITIS: ICD-10-CM

## 2021-02-18 DIAGNOSIS — M45.9 ANKYLOSING SPONDYLITIS, UNSPECIFIED SITE OF SPINE: ICD-10-CM

## 2021-02-18 DIAGNOSIS — M25.551 PAIN OF RIGHT HIP JOINT: ICD-10-CM

## 2021-02-18 DIAGNOSIS — R93.7 ABNORMAL THREE VIEW X-RAY OF CERVICAL SPINE: ICD-10-CM

## 2021-02-18 DIAGNOSIS — M25.551 BILATERAL HIP PAIN: ICD-10-CM

## 2021-02-18 PROCEDURE — 99999 PR PBB SHADOW E&M-EST. PATIENT-LVL III: CPT | Mod: PBBFAC,,, | Performed by: INTERNAL MEDICINE

## 2021-02-18 PROCEDURE — 3077F PR MOST RECENT SYSTOLIC BLOOD PRESSURE >= 140 MM HG: ICD-10-PCS | Mod: CPTII,S$GLB,, | Performed by: INTERNAL MEDICINE

## 2021-02-18 PROCEDURE — 99215 OFFICE O/P EST HI 40 MIN: CPT | Mod: S$GLB,,, | Performed by: INTERNAL MEDICINE

## 2021-02-18 PROCEDURE — 1159F MED LIST DOCD IN RCRD: CPT | Mod: S$GLB,,, | Performed by: INTERNAL MEDICINE

## 2021-02-18 PROCEDURE — 99999 PR PBB SHADOW E&M-EST. PATIENT-LVL III: ICD-10-PCS | Mod: PBBFAC,,, | Performed by: INTERNAL MEDICINE

## 2021-02-18 PROCEDURE — 73552 XR FEMUR 2 VIEW RIGHT: ICD-10-PCS | Mod: 26,RT,, | Performed by: RADIOLOGY

## 2021-02-18 PROCEDURE — 1125F AMNT PAIN NOTED PAIN PRSNT: CPT | Mod: S$GLB,,, | Performed by: INTERNAL MEDICINE

## 2021-02-18 PROCEDURE — 73552 X-RAY EXAM OF FEMUR 2/>: CPT | Mod: 26,RT,, | Performed by: RADIOLOGY

## 2021-02-18 PROCEDURE — 99215 PR OFFICE/OUTPT VISIT, EST, LEVL V, 40-54 MIN: ICD-10-PCS | Mod: S$GLB,,, | Performed by: INTERNAL MEDICINE

## 2021-02-18 PROCEDURE — 72050 X-RAY EXAM NECK SPINE 4/5VWS: CPT | Mod: TC,FY,PO

## 2021-02-18 PROCEDURE — 72050 X-RAY EXAM NECK SPINE 4/5VWS: CPT | Mod: 26,,, | Performed by: RADIOLOGY

## 2021-02-18 PROCEDURE — 72050 XR CERVICAL SPINE COMPLETE 5 VIEW: ICD-10-PCS | Mod: 26,,, | Performed by: RADIOLOGY

## 2021-02-18 PROCEDURE — 1125F PR PAIN SEVERITY QUANTIFIED, PAIN PRESENT: ICD-10-PCS | Mod: S$GLB,,, | Performed by: INTERNAL MEDICINE

## 2021-02-18 PROCEDURE — 1159F PR MEDICATION LIST DOCUMENTED IN MEDICAL RECORD: ICD-10-PCS | Mod: S$GLB,,, | Performed by: INTERNAL MEDICINE

## 2021-02-18 PROCEDURE — 73552 X-RAY EXAM OF FEMUR 2/>: CPT | Mod: TC,FY,PO,RT

## 2021-02-18 PROCEDURE — 73521 XR HIPS BILATERAL 2 VIEW INCL AP PELVIS: ICD-10-PCS | Mod: 26,,, | Performed by: RADIOLOGY

## 2021-02-18 PROCEDURE — 3080F PR MOST RECENT DIASTOLIC BLOOD PRESSURE >= 90 MM HG: ICD-10-PCS | Mod: CPTII,S$GLB,, | Performed by: INTERNAL MEDICINE

## 2021-02-18 PROCEDURE — 3077F SYST BP >= 140 MM HG: CPT | Mod: CPTII,S$GLB,, | Performed by: INTERNAL MEDICINE

## 2021-02-18 PROCEDURE — 3080F DIAST BP >= 90 MM HG: CPT | Mod: CPTII,S$GLB,, | Performed by: INTERNAL MEDICINE

## 2021-02-18 PROCEDURE — 73521 X-RAY EXAM HIPS BI 2 VIEWS: CPT | Mod: 26,,, | Performed by: RADIOLOGY

## 2021-02-18 PROCEDURE — 73521 X-RAY EXAM HIPS BI 2 VIEWS: CPT | Mod: TC,FY,PO

## 2021-02-18 RX ORDER — IBUPROFEN AND FAMOTIDINE 800; 26.6 MG/1; MG/1
1 TABLET, COATED ORAL
COMMUNITY
End: 2021-02-18 | Stop reason: SDUPTHER

## 2021-02-18 RX ORDER — CYCLOSPORINE 0.5 MG/ML
1 EMULSION OPHTHALMIC 2 TIMES DAILY
COMMUNITY
Start: 2021-01-13

## 2021-02-18 RX ORDER — IBUPROFEN AND FAMOTIDINE 800; 26.6 MG/1; MG/1
1 TABLET, COATED ORAL 3 TIMES DAILY
Qty: 90 TABLET | Refills: 12 | Status: SHIPPED | OUTPATIENT
Start: 2021-02-18 | End: 2021-03-20

## 2021-02-18 ASSESSMENT — ROUTINE ASSESSMENT OF PATIENT INDEX DATA (RAPID3)
FATIGUE SCORE: 2.2
TOTAL RAPID3 SCORE: 7.22
PSYCHOLOGICAL DISTRESS SCORE: 0
MDHAQ FUNCTION SCORE: 1.7
PAIN SCORE: 8
PATIENT GLOBAL ASSESSMENT SCORE: 8

## 2021-02-19 ENCOUNTER — TELEPHONE (OUTPATIENT)
Dept: RHEUMATOLOGY | Facility: CLINIC | Age: 76
End: 2021-02-19

## 2021-02-24 ENCOUNTER — DOCUMENTATION ONLY (OUTPATIENT)
Dept: RHEUMATOLOGY | Facility: CLINIC | Age: 76
End: 2021-02-24

## 2021-06-21 ENCOUNTER — OFFICE VISIT (OUTPATIENT)
Dept: FAMILY MEDICINE | Facility: CLINIC | Age: 76
End: 2021-06-21
Payer: MEDICARE

## 2021-06-21 ENCOUNTER — LAB VISIT (OUTPATIENT)
Dept: LAB | Facility: HOSPITAL | Age: 76
End: 2021-06-21
Attending: FAMILY MEDICINE
Payer: MEDICARE

## 2021-06-21 VITALS
DIASTOLIC BLOOD PRESSURE: 86 MMHG | HEART RATE: 66 BPM | TEMPERATURE: 98 F | SYSTOLIC BLOOD PRESSURE: 154 MMHG | HEIGHT: 64 IN | OXYGEN SATURATION: 96 % | WEIGHT: 154.31 LBS | BODY MASS INDEX: 26.34 KG/M2

## 2021-06-21 DIAGNOSIS — I10 ESSENTIAL HYPERTENSION: Primary | ICD-10-CM

## 2021-06-21 DIAGNOSIS — N28.9 ABNORMAL KIDNEY FUNCTION: ICD-10-CM

## 2021-06-21 DIAGNOSIS — E78.49 OTHER HYPERLIPIDEMIA: ICD-10-CM

## 2021-06-21 DIAGNOSIS — R22.1 LOCALIZED SWELLING, MASS AND LUMP, NECK: ICD-10-CM

## 2021-06-21 DIAGNOSIS — M25.551 RIGHT HIP PAIN: ICD-10-CM

## 2021-06-21 DIAGNOSIS — E87.6 HYPOKALEMIA: ICD-10-CM

## 2021-06-21 DIAGNOSIS — Z01.818 PREOPERATIVE CLEARANCE: ICD-10-CM

## 2021-06-21 DIAGNOSIS — E55.9 VITAMIN D DEFICIENCY: ICD-10-CM

## 2021-06-21 PROCEDURE — 3079F PR MOST RECENT DIASTOLIC BLOOD PRESSURE 80-89 MM HG: ICD-10-PCS | Mod: CPTII,S$GLB,, | Performed by: FAMILY MEDICINE

## 2021-06-21 PROCEDURE — 1101F PT FALLS ASSESS-DOCD LE1/YR: CPT | Mod: CPTII,S$GLB,, | Performed by: FAMILY MEDICINE

## 2021-06-21 PROCEDURE — 99204 PR OFFICE/OUTPT VISIT, NEW, LEVL IV, 45-59 MIN: ICD-10-PCS | Mod: S$GLB,,, | Performed by: FAMILY MEDICINE

## 2021-06-21 PROCEDURE — 3288F PR FALLS RISK ASSESSMENT DOCUMENTED: ICD-10-PCS | Mod: CPTII,S$GLB,, | Performed by: FAMILY MEDICINE

## 2021-06-21 PROCEDURE — 3077F PR MOST RECENT SYSTOLIC BLOOD PRESSURE >= 140 MM HG: ICD-10-PCS | Mod: CPTII,S$GLB,, | Performed by: FAMILY MEDICINE

## 2021-06-21 PROCEDURE — 1159F MED LIST DOCD IN RCRD: CPT | Mod: S$GLB,,, | Performed by: FAMILY MEDICINE

## 2021-06-21 PROCEDURE — 1125F AMNT PAIN NOTED PAIN PRSNT: CPT | Mod: S$GLB,,, | Performed by: FAMILY MEDICINE

## 2021-06-21 PROCEDURE — 36415 COLL VENOUS BLD VENIPUNCTURE: CPT | Mod: PO | Performed by: FAMILY MEDICINE

## 2021-06-21 PROCEDURE — 3077F SYST BP >= 140 MM HG: CPT | Mod: CPTII,S$GLB,, | Performed by: FAMILY MEDICINE

## 2021-06-21 PROCEDURE — 99204 OFFICE O/P NEW MOD 45 MIN: CPT | Mod: S$GLB,,, | Performed by: FAMILY MEDICINE

## 2021-06-21 PROCEDURE — 3288F FALL RISK ASSESSMENT DOCD: CPT | Mod: CPTII,S$GLB,, | Performed by: FAMILY MEDICINE

## 2021-06-21 PROCEDURE — 99999 PR PBB SHADOW E&M-EST. PATIENT-LVL V: ICD-10-PCS | Mod: PBBFAC,,, | Performed by: FAMILY MEDICINE

## 2021-06-21 PROCEDURE — 99999 PR PBB SHADOW E&M-EST. PATIENT-LVL V: CPT | Mod: PBBFAC,,, | Performed by: FAMILY MEDICINE

## 2021-06-21 PROCEDURE — 1101F PR PT FALLS ASSESS DOC 0-1 FALLS W/OUT INJ PAST YR: ICD-10-PCS | Mod: CPTII,S$GLB,, | Performed by: FAMILY MEDICINE

## 2021-06-21 PROCEDURE — 80048 BASIC METABOLIC PNL TOTAL CA: CPT | Performed by: FAMILY MEDICINE

## 2021-06-21 PROCEDURE — 3079F DIAST BP 80-89 MM HG: CPT | Mod: CPTII,S$GLB,, | Performed by: FAMILY MEDICINE

## 2021-06-21 PROCEDURE — 1125F PR PAIN SEVERITY QUANTIFIED, PAIN PRESENT: ICD-10-PCS | Mod: S$GLB,,, | Performed by: FAMILY MEDICINE

## 2021-06-21 PROCEDURE — 1159F PR MEDICATION LIST DOCUMENTED IN MEDICAL RECORD: ICD-10-PCS | Mod: S$GLB,,, | Performed by: FAMILY MEDICINE

## 2021-06-21 RX ORDER — IBUPROFEN AND FAMOTIDINE 26.6; 8 MG/1; MG/1
1 TABLET ORAL 3 TIMES DAILY PRN
COMMUNITY
End: 2021-10-18 | Stop reason: SDUPTHER

## 2021-06-22 LAB
ANION GAP SERPL CALC-SCNC: 11 MMOL/L (ref 8–16)
BUN SERPL-MCNC: 21 MG/DL (ref 8–23)
CALCIUM SERPL-MCNC: 9.6 MG/DL (ref 8.7–10.5)
CHLORIDE SERPL-SCNC: 106 MMOL/L (ref 95–110)
CO2 SERPL-SCNC: 26 MMOL/L (ref 23–29)
CREAT SERPL-MCNC: 1 MG/DL (ref 0.5–1.4)
EST. GFR  (AFRICAN AMERICAN): >60 ML/MIN/1.73 M^2
EST. GFR  (NON AFRICAN AMERICAN): 55.2 ML/MIN/1.73 M^2
GLUCOSE SERPL-MCNC: 96 MG/DL (ref 70–110)
POTASSIUM SERPL-SCNC: 4.1 MMOL/L (ref 3.5–5.1)
SODIUM SERPL-SCNC: 143 MMOL/L (ref 136–145)

## 2021-06-30 ENCOUNTER — PATIENT MESSAGE (OUTPATIENT)
Dept: ADMINISTRATIVE | Facility: HOSPITAL | Age: 76
End: 2021-06-30

## 2021-06-30 ENCOUNTER — PATIENT OUTREACH (OUTPATIENT)
Dept: ADMINISTRATIVE | Facility: HOSPITAL | Age: 76
End: 2021-06-30

## 2021-06-30 DIAGNOSIS — Z12.11 COLON CANCER SCREENING: ICD-10-CM

## 2021-07-07 ENCOUNTER — PATIENT MESSAGE (OUTPATIENT)
Dept: ADMINISTRATIVE | Facility: HOSPITAL | Age: 76
End: 2021-07-07

## 2021-07-08 ENCOUNTER — PATIENT MESSAGE (OUTPATIENT)
Dept: FAMILY MEDICINE | Facility: CLINIC | Age: 76
End: 2021-07-08

## 2021-07-08 DIAGNOSIS — A49.9 UTI (URINARY TRACT INFECTION), BACTERIAL: Primary | ICD-10-CM

## 2021-07-08 DIAGNOSIS — N39.0 UTI (URINARY TRACT INFECTION), BACTERIAL: Primary | ICD-10-CM

## 2021-07-09 ENCOUNTER — TELEPHONE (OUTPATIENT)
Dept: FAMILY MEDICINE | Facility: CLINIC | Age: 76
End: 2021-07-09

## 2021-07-09 ENCOUNTER — PATIENT MESSAGE (OUTPATIENT)
Dept: RHEUMATOLOGY | Facility: CLINIC | Age: 76
End: 2021-07-09

## 2021-07-09 ENCOUNTER — PATIENT MESSAGE (OUTPATIENT)
Dept: FAMILY MEDICINE | Facility: CLINIC | Age: 76
End: 2021-07-09

## 2021-07-09 ENCOUNTER — LAB VISIT (OUTPATIENT)
Dept: LAB | Facility: HOSPITAL | Age: 76
End: 2021-07-09
Attending: FAMILY MEDICINE
Payer: MEDICARE

## 2021-07-09 DIAGNOSIS — N39.0 UTI (URINARY TRACT INFECTION), BACTERIAL: ICD-10-CM

## 2021-07-09 DIAGNOSIS — A49.9 UTI (URINARY TRACT INFECTION), BACTERIAL: ICD-10-CM

## 2021-07-09 PROCEDURE — 87086 URINE CULTURE/COLONY COUNT: CPT | Performed by: FAMILY MEDICINE

## 2021-07-09 PROCEDURE — 87088 URINE BACTERIA CULTURE: CPT | Performed by: FAMILY MEDICINE

## 2021-07-09 PROCEDURE — 87184 SC STD DISK METHOD PER PLATE: CPT | Performed by: FAMILY MEDICINE

## 2021-07-09 PROCEDURE — 87186 SC STD MICRODIL/AGAR DIL: CPT | Mod: 59 | Performed by: FAMILY MEDICINE

## 2021-07-09 PROCEDURE — 87077 CULTURE AEROBIC IDENTIFY: CPT | Performed by: FAMILY MEDICINE

## 2021-07-09 RX ORDER — AMOXICILLIN AND CLAVULANATE POTASSIUM 875; 125 MG/1; MG/1
1 TABLET, FILM COATED ORAL 2 TIMES DAILY
Qty: 14 TABLET | Refills: 0 | Status: SHIPPED | OUTPATIENT
Start: 2021-07-09 | End: 2021-07-16

## 2021-07-13 LAB — BACTERIA UR CULT: ABNORMAL

## 2021-08-06 ENCOUNTER — DOCUMENT SCAN (OUTPATIENT)
Dept: HOME HEALTH SERVICES | Facility: HOSPITAL | Age: 76
End: 2021-08-06

## 2021-08-11 ENCOUNTER — EXTERNAL HOME HEALTH (OUTPATIENT)
Dept: HOME HEALTH SERVICES | Facility: HOSPITAL | Age: 76
End: 2021-08-11

## 2021-08-23 ENCOUNTER — DOCUMENT SCAN (OUTPATIENT)
Dept: HOME HEALTH SERVICES | Facility: HOSPITAL | Age: 76
End: 2021-08-23

## 2021-09-21 ENCOUNTER — TELEPHONE (OUTPATIENT)
Dept: RHEUMATOLOGY | Facility: CLINIC | Age: 76
End: 2021-09-21

## 2021-10-18 ENCOUNTER — OFFICE VISIT (OUTPATIENT)
Dept: RHEUMATOLOGY | Facility: CLINIC | Age: 76
End: 2021-10-18
Payer: MEDICARE

## 2021-10-18 ENCOUNTER — IMMUNIZATION (OUTPATIENT)
Dept: FAMILY MEDICINE | Facility: CLINIC | Age: 76
End: 2021-10-18
Payer: MEDICARE

## 2021-10-18 VITALS
SYSTOLIC BLOOD PRESSURE: 170 MMHG | DIASTOLIC BLOOD PRESSURE: 75 MMHG | HEIGHT: 64 IN | HEART RATE: 75 BPM | BODY MASS INDEX: 25.1 KG/M2 | WEIGHT: 147 LBS

## 2021-10-18 DIAGNOSIS — Z23 NEED FOR VACCINATION: Primary | ICD-10-CM

## 2021-10-18 DIAGNOSIS — M47.819 SPONDYLARTHRITIS: ICD-10-CM

## 2021-10-18 DIAGNOSIS — E55.9 VITAMIN D DEFICIENCY: ICD-10-CM

## 2021-10-18 DIAGNOSIS — M25.551 BILATERAL HIP PAIN: ICD-10-CM

## 2021-10-18 DIAGNOSIS — M25.551 RIGHT HIP PAIN: ICD-10-CM

## 2021-10-18 DIAGNOSIS — M45.9 ANKYLOSING SPONDYLITIS, UNSPECIFIED SITE OF SPINE: Primary | ICD-10-CM

## 2021-10-18 DIAGNOSIS — M25.552 BILATERAL HIP PAIN: ICD-10-CM

## 2021-10-18 PROCEDURE — 3078F DIAST BP <80 MM HG: CPT | Mod: HCNC,CPTII,S$GLB, | Performed by: INTERNAL MEDICINE

## 2021-10-18 PROCEDURE — 99499 RISK ADDL DX/OHS AUDIT: ICD-10-PCS | Mod: S$GLB,,, | Performed by: INTERNAL MEDICINE

## 2021-10-18 PROCEDURE — 99999 PR PBB SHADOW E&M-EST. PATIENT-LVL III: ICD-10-PCS | Mod: PBBFAC,HCNC,, | Performed by: INTERNAL MEDICINE

## 2021-10-18 PROCEDURE — 99999 PR PBB SHADOW E&M-EST. PATIENT-LVL III: CPT | Mod: PBBFAC,HCNC,, | Performed by: INTERNAL MEDICINE

## 2021-10-18 PROCEDURE — 1125F PR PAIN SEVERITY QUANTIFIED, PAIN PRESENT: ICD-10-PCS | Mod: HCNC,CPTII,S$GLB, | Performed by: INTERNAL MEDICINE

## 2021-10-18 PROCEDURE — 1125F AMNT PAIN NOTED PAIN PRSNT: CPT | Mod: HCNC,CPTII,S$GLB, | Performed by: INTERNAL MEDICINE

## 2021-10-18 PROCEDURE — 1160F RVW MEDS BY RX/DR IN RCRD: CPT | Mod: HCNC,CPTII,S$GLB, | Performed by: INTERNAL MEDICINE

## 2021-10-18 PROCEDURE — 1159F MED LIST DOCD IN RCRD: CPT | Mod: HCNC,CPTII,S$GLB, | Performed by: INTERNAL MEDICINE

## 2021-10-18 PROCEDURE — 99499 UNLISTED E&M SERVICE: CPT | Mod: S$GLB,,, | Performed by: INTERNAL MEDICINE

## 2021-10-18 PROCEDURE — 99215 OFFICE O/P EST HI 40 MIN: CPT | Mod: HCNC,S$GLB,, | Performed by: INTERNAL MEDICINE

## 2021-10-18 PROCEDURE — 99215 PR OFFICE/OUTPT VISIT, EST, LEVL V, 40-54 MIN: ICD-10-PCS | Mod: HCNC,S$GLB,, | Performed by: INTERNAL MEDICINE

## 2021-10-18 PROCEDURE — 91300 COVID-19, MRNA, LNP-S, PF, 30 MCG/0.3 ML DOSE VACCINE: CPT | Mod: HCNC,PBBFAC | Performed by: FAMILY MEDICINE

## 2021-10-18 PROCEDURE — 1160F PR REVIEW ALL MEDS BY PRESCRIBER/CLIN PHARMACIST DOCUMENTED: ICD-10-PCS | Mod: HCNC,CPTII,S$GLB, | Performed by: INTERNAL MEDICINE

## 2021-10-18 PROCEDURE — 3078F PR MOST RECENT DIASTOLIC BLOOD PRESSURE < 80 MM HG: ICD-10-PCS | Mod: HCNC,CPTII,S$GLB, | Performed by: INTERNAL MEDICINE

## 2021-10-18 PROCEDURE — 3077F PR MOST RECENT SYSTOLIC BLOOD PRESSURE >= 140 MM HG: ICD-10-PCS | Mod: HCNC,CPTII,S$GLB, | Performed by: INTERNAL MEDICINE

## 2021-10-18 PROCEDURE — 0003A COVID-19, MRNA, LNP-S, PF, 30 MCG/0.3 ML DOSE VACCINE: CPT | Mod: HCNC,PBBFAC | Performed by: FAMILY MEDICINE

## 2021-10-18 PROCEDURE — 1159F PR MEDICATION LIST DOCUMENTED IN MEDICAL RECORD: ICD-10-PCS | Mod: HCNC,CPTII,S$GLB, | Performed by: INTERNAL MEDICINE

## 2021-10-18 PROCEDURE — 3077F SYST BP >= 140 MM HG: CPT | Mod: HCNC,CPTII,S$GLB, | Performed by: INTERNAL MEDICINE

## 2021-10-18 RX ORDER — IBUPROFEN AND FAMOTIDINE 26.6; 8 MG/1; MG/1
1 TABLET ORAL 3 TIMES DAILY PRN
Qty: 90 TABLET | Refills: 12 | Status: SHIPPED | OUTPATIENT
Start: 2021-10-18 | End: 2022-10-18

## 2021-10-18 RX ORDER — HYDROCODONE BITARTRATE AND ACETAMINOPHEN 5; 325 MG/1; MG/1
TABLET ORAL
COMMUNITY
Start: 2021-08-04 | End: 2021-10-18

## 2021-12-19 ENCOUNTER — OFFICE VISIT (OUTPATIENT)
Dept: FAMILY MEDICINE | Facility: CLINIC | Age: 76
End: 2021-12-19
Payer: MEDICARE

## 2021-12-19 DIAGNOSIS — J40 BRONCHITIS: Primary | ICD-10-CM

## 2021-12-19 PROCEDURE — 99442 PR PHYSICIAN TELEPHONE EVALUATION 11-20 MIN: ICD-10-PCS | Mod: HCNC,95,, | Performed by: PHYSICIAN ASSISTANT

## 2021-12-19 PROCEDURE — 99442 PR PHYSICIAN TELEPHONE EVALUATION 11-20 MIN: CPT | Mod: HCNC,95,, | Performed by: PHYSICIAN ASSISTANT

## 2021-12-19 RX ORDER — PREDNISONE 20 MG/1
20 TABLET ORAL DAILY
Qty: 5 TABLET | Refills: 0 | Status: SHIPPED | OUTPATIENT
Start: 2021-12-19 | End: 2021-12-28

## 2021-12-19 RX ORDER — AZITHROMYCIN 250 MG/1
TABLET, FILM COATED ORAL
Qty: 6 TABLET | Refills: 0 | Status: SHIPPED | OUTPATIENT
Start: 2021-12-19 | End: 2023-08-09

## 2021-12-19 RX ORDER — PROMETHAZINE HYDROCHLORIDE AND DEXTROMETHORPHAN HYDROBROMIDE 6.25; 15 MG/5ML; MG/5ML
5 SYRUP ORAL 3 TIMES DAILY PRN
Qty: 118 ML | Refills: 0 | Status: SHIPPED | OUTPATIENT
Start: 2021-12-19 | End: 2021-12-26

## 2021-12-28 ENCOUNTER — OFFICE VISIT (OUTPATIENT)
Dept: FAMILY MEDICINE | Facility: CLINIC | Age: 76
End: 2021-12-28
Payer: MEDICARE

## 2021-12-28 ENCOUNTER — HOSPITAL ENCOUNTER (OUTPATIENT)
Dept: RADIOLOGY | Facility: HOSPITAL | Age: 76
Discharge: HOME | End: 2021-12-28
Attending: PHYSICIAN ASSISTANT
Payer: MEDICARE

## 2021-12-28 DIAGNOSIS — J22 LOWER RESP. TRACT INFECTION: ICD-10-CM

## 2021-12-28 DIAGNOSIS — I10 ESSENTIAL HYPERTENSION: ICD-10-CM

## 2021-12-28 DIAGNOSIS — J22 LOWER RESP. TRACT INFECTION: Primary | ICD-10-CM

## 2021-12-28 LAB
INFLUENZA A, MOLECULAR: NEGATIVE
INFLUENZA B, MOLECULAR: NEGATIVE
SPECIMEN SOURCE: NORMAL

## 2021-12-28 PROCEDURE — 71046 X-RAY EXAM CHEST 2 VIEWS: CPT | Mod: TC,HCNC,FY,PO

## 2021-12-28 PROCEDURE — 87502 INFLUENZA DNA AMP PROBE: CPT | Mod: PO | Performed by: PHYSICIAN ASSISTANT

## 2021-12-28 PROCEDURE — 99214 OFFICE O/P EST MOD 30 MIN: CPT | Mod: 95,,, | Performed by: PHYSICIAN ASSISTANT

## 2021-12-28 PROCEDURE — 71046 X-RAY EXAM CHEST 2 VIEWS: CPT | Mod: 26,HCNC,, | Performed by: RADIOLOGY

## 2021-12-28 PROCEDURE — 71046 XR CHEST PA AND LATERAL: ICD-10-PCS | Mod: 26,HCNC,, | Performed by: RADIOLOGY

## 2021-12-28 PROCEDURE — 99214 PR OFFICE/OUTPT VISIT, EST, LEVL IV, 30-39 MIN: ICD-10-PCS | Mod: 95,,, | Performed by: PHYSICIAN ASSISTANT

## 2021-12-28 RX ORDER — PROMETHAZINE HYDROCHLORIDE AND DEXTROMETHORPHAN HYDROBROMIDE 6.25; 15 MG/5ML; MG/5ML
5 SYRUP ORAL NIGHTLY PRN
Qty: 118 ML | Refills: 0 | Status: SHIPPED | OUTPATIENT
Start: 2021-12-28 | End: 2021-12-30 | Stop reason: SDUPTHER

## 2021-12-28 RX ORDER — DOXYCYCLINE 100 MG/1
100 CAPSULE ORAL 2 TIMES DAILY
Qty: 20 CAPSULE | Refills: 0 | Status: SHIPPED | OUTPATIENT
Start: 2021-12-28 | End: 2022-01-07

## 2021-12-28 RX ORDER — BENZONATATE 200 MG/1
200 CAPSULE ORAL 3 TIMES DAILY PRN
Qty: 30 CAPSULE | Refills: 0 | Status: SHIPPED | OUTPATIENT
Start: 2021-12-28 | End: 2022-01-07

## 2021-12-30 ENCOUNTER — OFFICE VISIT (OUTPATIENT)
Dept: FAMILY MEDICINE | Facility: CLINIC | Age: 76
End: 2021-12-30
Payer: MEDICARE

## 2021-12-30 DIAGNOSIS — J22 LOWER RESP. TRACT INFECTION: ICD-10-CM

## 2021-12-30 PROCEDURE — 99214 OFFICE O/P EST MOD 30 MIN: CPT | Mod: 95,,, | Performed by: PHYSICIAN ASSISTANT

## 2021-12-30 PROCEDURE — 99214 PR OFFICE/OUTPT VISIT, EST, LEVL IV, 30-39 MIN: ICD-10-PCS | Mod: 95,,, | Performed by: PHYSICIAN ASSISTANT

## 2021-12-30 RX ORDER — PREDNISONE 10 MG/1
TABLET ORAL
Qty: 18 TABLET | Refills: 0 | Status: SHIPPED | OUTPATIENT
Start: 2021-12-30 | End: 2022-01-08

## 2021-12-30 RX ORDER — PROMETHAZINE HYDROCHLORIDE AND DEXTROMETHORPHAN HYDROBROMIDE 6.25; 15 MG/5ML; MG/5ML
5 SYRUP ORAL EVERY 6 HOURS PRN
Qty: 118 ML | Refills: 0 | Status: SHIPPED | OUTPATIENT
Start: 2021-12-30 | End: 2022-01-09

## 2021-12-30 RX ORDER — ALBUTEROL SULFATE 90 UG/1
2 AEROSOL, METERED RESPIRATORY (INHALATION) EVERY 6 HOURS PRN
Qty: 18 G | Refills: 0 | Status: SHIPPED | OUTPATIENT
Start: 2021-12-30 | End: 2023-08-09

## 2022-01-11 ENCOUNTER — PATIENT MESSAGE (OUTPATIENT)
Dept: FAMILY MEDICINE | Facility: CLINIC | Age: 77
End: 2022-01-11
Payer: MEDICARE

## 2022-01-11 DIAGNOSIS — I10 HYPERTENSION: ICD-10-CM

## 2022-01-11 NOTE — TELEPHONE ENCOUNTER
No new care gaps identified.  Powered by [email or web address]. Reference number: [id number].   1/11/2022 9:59:58 AM CST

## 2022-01-12 RX ORDER — HYDROCHLOROTHIAZIDE 25 MG/1
25 TABLET ORAL EVERY MORNING
Qty: 90 TABLET | Refills: 1 | Status: SHIPPED | OUTPATIENT
Start: 2022-01-12 | End: 2022-05-30

## 2022-01-12 RX ORDER — BENAZEPRIL HYDROCHLORIDE 20 MG/1
20 TABLET ORAL DAILY
Qty: 90 TABLET | Refills: 1 | Status: SHIPPED | OUTPATIENT
Start: 2022-01-12 | End: 2022-05-30

## 2022-03-10 ENCOUNTER — PATIENT MESSAGE (OUTPATIENT)
Dept: RHEUMATOLOGY | Facility: CLINIC | Age: 77
End: 2022-03-10
Payer: MEDICARE

## 2022-04-12 ENCOUNTER — IMMUNIZATION (OUTPATIENT)
Dept: FAMILY MEDICINE | Facility: CLINIC | Age: 77
End: 2022-04-12
Payer: MEDICARE

## 2022-04-12 DIAGNOSIS — Z23 NEED FOR VACCINATION: Primary | ICD-10-CM

## 2022-04-12 PROCEDURE — 91305 COVID-19, MRNA, LNP-S, PF, 30 MCG/0.3 ML DOSE VACCINE (PFIZER): CPT | Mod: PBBFAC | Performed by: FAMILY MEDICINE

## 2022-05-09 ENCOUNTER — PATIENT MESSAGE (OUTPATIENT)
Dept: SMOKING CESSATION | Facility: CLINIC | Age: 77
End: 2022-05-09
Payer: MEDICARE

## 2022-05-30 DIAGNOSIS — I10 HYPERTENSION: ICD-10-CM

## 2022-05-30 RX ORDER — BENAZEPRIL HYDROCHLORIDE 20 MG/1
TABLET ORAL
Qty: 90 TABLET | Refills: 0 | Status: SHIPPED | OUTPATIENT
Start: 2022-05-30 | End: 2023-08-09

## 2022-05-30 RX ORDER — HYDROCHLOROTHIAZIDE 25 MG/1
TABLET ORAL
Qty: 90 TABLET | Refills: 0 | Status: SHIPPED | OUTPATIENT
Start: 2022-05-30 | End: 2023-08-09

## 2022-05-30 NOTE — TELEPHONE ENCOUNTER
Refill Routing Note   Medication(s) are not appropriate for processing by [facility] for the following reason(s):      - Required vitals are abnormal    [facility] action(s):  Defer          Medication reconciliation completed: No     Appointments  past 12m or future 3m with PCP    Date Provider   Last Visit   6/21/2021 Jeaneth Baires MD   Next Visit   Visit date not found Jeaneth Baires MD   ED visits in past 90 days: 0        Note composed:3:19 PM 05/30/2022

## 2022-05-30 NOTE — TELEPHONE ENCOUNTER
No new care gaps identified.  [facility] Embedded Care Gaps. Reference number: [id number]. 5/30/2022   3:15:09 PM CDT

## 2022-07-07 ENCOUNTER — PATIENT MESSAGE (OUTPATIENT)
Dept: ADMINISTRATIVE | Facility: HOSPITAL | Age: 77
End: 2022-07-07
Payer: MEDICARE

## 2022-07-07 ENCOUNTER — PATIENT OUTREACH (OUTPATIENT)
Dept: ADMINISTRATIVE | Facility: HOSPITAL | Age: 77
End: 2022-07-07
Payer: MEDICARE

## 2022-08-05 ENCOUNTER — PATIENT MESSAGE (OUTPATIENT)
Dept: FAMILY MEDICINE | Facility: CLINIC | Age: 77
End: 2022-08-05
Payer: MEDICARE

## 2022-08-05 ENCOUNTER — TELEPHONE (OUTPATIENT)
Dept: FAMILY MEDICINE | Facility: CLINIC | Age: 77
End: 2022-08-05
Payer: MEDICARE

## 2022-08-05 DIAGNOSIS — U07.1 LAB TEST POSITIVE FOR DETECTION OF COVID-19 VIRUS: Primary | ICD-10-CM

## 2022-08-05 NOTE — TELEPHONE ENCOUNTER
Patient tested positive for covid today and want to know can she get the covid medication to take and sent to [facility]

## 2022-08-05 NOTE — TELEPHONE ENCOUNTER
----- Message from Chanda Son, Patient Care Assistant sent at 8/5/2022 11:47 AM CDT -----  Contact: Pt Daughter Lupis  Type: Same Day Appointment    Caller is requesting a same day appointment.  Caller declined first available appt listed below.    Name of caller:Pt Randal Baugh  When is the first available appt:08/08/2022  Symptoms:Covid Positive/Covid Infusion  Best Call back number:[phone]  Additional Info:Pt daughter is calling to see about getting a virtual appt to get advice as to how she can treat her mom. Please advise-Thank you~

## 2022-08-19 ENCOUNTER — PATIENT MESSAGE (OUTPATIENT)
Dept: ADMINISTRATIVE | Facility: HOSPITAL | Age: 77
End: 2022-08-19
Payer: MEDICARE

## 2022-08-31 DIAGNOSIS — Z78.0 MENOPAUSE: ICD-10-CM

## 2022-09-20 ENCOUNTER — TELEPHONE (OUTPATIENT)
Dept: FAMILY MEDICINE | Facility: CLINIC | Age: 77
End: 2022-09-20
Payer: MEDICARE

## 2023-08-09 ENCOUNTER — OFFICE VISIT (OUTPATIENT)
Dept: RHEUMATOLOGY | Facility: CLINIC | Age: 78
End: 2023-08-09
Payer: MEDICARE

## 2023-08-09 VITALS
HEIGHT: 63 IN | BODY MASS INDEX: 26.8 KG/M2 | HEART RATE: 74 BPM | WEIGHT: 151.25 LBS | SYSTOLIC BLOOD PRESSURE: 170 MMHG | DIASTOLIC BLOOD PRESSURE: 83 MMHG

## 2023-08-09 DIAGNOSIS — M54.2 NECK PAIN, BILATERAL: ICD-10-CM

## 2023-08-09 DIAGNOSIS — M54.41 LOW BACK PAIN WITH RIGHT-SIDED SCIATICA, UNSPECIFIED BACK PAIN LATERALITY, UNSPECIFIED CHRONICITY: ICD-10-CM

## 2023-08-09 DIAGNOSIS — M25.511 CHRONIC RIGHT SHOULDER PAIN: Primary | ICD-10-CM

## 2023-08-09 DIAGNOSIS — M47.819 SPONDYLARTHRITIS: ICD-10-CM

## 2023-08-09 DIAGNOSIS — M47.817 LUMBAR AND SACRAL SPONDYLARTHRITIS: ICD-10-CM

## 2023-08-09 DIAGNOSIS — M45.9 ANKYLOSING SPONDYLITIS, UNSPECIFIED SITE OF SPINE: ICD-10-CM

## 2023-08-09 DIAGNOSIS — G89.29 CHRONIC RIGHT SHOULDER PAIN: Primary | ICD-10-CM

## 2023-08-09 DIAGNOSIS — M25.551 BILATERAL HIP PAIN: ICD-10-CM

## 2023-08-09 DIAGNOSIS — M25.552 BILATERAL HIP PAIN: ICD-10-CM

## 2023-08-09 PROCEDURE — 99215 PR OFFICE/OUTPT VISIT, EST, LEVL V, 40-54 MIN: ICD-10-PCS | Mod: 25,S$GLB,, | Performed by: INTERNAL MEDICINE

## 2023-08-09 PROCEDURE — 99999 PR PBB SHADOW E&M-EST. PATIENT-LVL III: CPT | Mod: PBBFAC,,, | Performed by: INTERNAL MEDICINE

## 2023-08-09 PROCEDURE — 3077F SYST BP >= 140 MM HG: CPT | Mod: CPTII,S$GLB,, | Performed by: INTERNAL MEDICINE

## 2023-08-09 PROCEDURE — 96372 THER/PROPH/DIAG INJ SC/IM: CPT | Mod: S$GLB,,, | Performed by: INTERNAL MEDICINE

## 2023-08-09 PROCEDURE — 99999 PR PBB SHADOW E&M-EST. PATIENT-LVL III: ICD-10-PCS | Mod: PBBFAC,,, | Performed by: INTERNAL MEDICINE

## 2023-08-09 PROCEDURE — 3079F DIAST BP 80-89 MM HG: CPT | Mod: CPTII,S$GLB,, | Performed by: INTERNAL MEDICINE

## 2023-08-09 PROCEDURE — 1101F PT FALLS ASSESS-DOCD LE1/YR: CPT | Mod: CPTII,S$GLB,, | Performed by: INTERNAL MEDICINE

## 2023-08-09 PROCEDURE — 3077F PR MOST RECENT SYSTOLIC BLOOD PRESSURE >= 140 MM HG: ICD-10-PCS | Mod: CPTII,S$GLB,, | Performed by: INTERNAL MEDICINE

## 2023-08-09 PROCEDURE — 3288F FALL RISK ASSESSMENT DOCD: CPT | Mod: CPTII,S$GLB,, | Performed by: INTERNAL MEDICINE

## 2023-08-09 PROCEDURE — 96372 PR INJECTION,THERAP/PROPH/DIAG2ST, IM OR SUBCUT: ICD-10-PCS | Mod: S$GLB,,, | Performed by: INTERNAL MEDICINE

## 2023-08-09 PROCEDURE — 3079F PR MOST RECENT DIASTOLIC BLOOD PRESSURE 80-89 MM HG: ICD-10-PCS | Mod: CPTII,S$GLB,, | Performed by: INTERNAL MEDICINE

## 2023-08-09 PROCEDURE — 3288F PR FALLS RISK ASSESSMENT DOCUMENTED: ICD-10-PCS | Mod: CPTII,S$GLB,, | Performed by: INTERNAL MEDICINE

## 2023-08-09 PROCEDURE — 1101F PR PT FALLS ASSESS DOC 0-1 FALLS W/OUT INJ PAST YR: ICD-10-PCS | Mod: CPTII,S$GLB,, | Performed by: INTERNAL MEDICINE

## 2023-08-09 PROCEDURE — 1125F PR PAIN SEVERITY QUANTIFIED, PAIN PRESENT: ICD-10-PCS | Mod: CPTII,S$GLB,, | Performed by: INTERNAL MEDICINE

## 2023-08-09 PROCEDURE — 99215 OFFICE O/P EST HI 40 MIN: CPT | Mod: 25,S$GLB,, | Performed by: INTERNAL MEDICINE

## 2023-08-09 PROCEDURE — 1125F AMNT PAIN NOTED PAIN PRSNT: CPT | Mod: CPTII,S$GLB,, | Performed by: INTERNAL MEDICINE

## 2023-08-09 RX ORDER — FAMOTIDINE 20 MG/1
20 TABLET, FILM COATED ORAL 2 TIMES DAILY
Qty: 180 TABLET | Refills: 3 | Status: SHIPPED | OUTPATIENT
Start: 2023-08-09 | End: 2024-08-08

## 2023-08-09 RX ORDER — KETOROLAC TROMETHAMINE 30 MG/ML
60 INJECTION, SOLUTION INTRAMUSCULAR; INTRAVENOUS
Status: COMPLETED | OUTPATIENT
Start: 2023-08-09 | End: 2023-08-09

## 2023-08-09 RX ORDER — IBUPROFEN 800 MG/1
800 TABLET ORAL 2 TIMES DAILY
Qty: 180 TABLET | Refills: 1 | Status: SHIPPED | OUTPATIENT
Start: 2023-08-09 | End: 2024-02-05

## 2023-08-09 RX ADMIN — KETOROLAC TROMETHAMINE 60 MG: 30 INJECTION, SOLUTION INTRAMUSCULAR; INTRAVENOUS at 02:08
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
## 2023-08-30 ENCOUNTER — HOSPITAL ENCOUNTER (OUTPATIENT)
Dept: RADIOLOGY | Facility: HOSPITAL | Age: 78
Discharge: HOME | End: 2023-08-30
Attending: INTERNAL MEDICINE
Payer: MEDICARE

## 2023-08-30 ENCOUNTER — TELEPHONE (OUTPATIENT)
Dept: RHEUMATOLOGY | Facility: CLINIC | Age: 78
End: 2023-08-30
Payer: MEDICARE

## 2023-08-30 DIAGNOSIS — G89.29 CHRONIC RIGHT SHOULDER PAIN: ICD-10-CM

## 2023-08-30 DIAGNOSIS — M25.511 CHRONIC RIGHT SHOULDER PAIN: ICD-10-CM

## 2023-08-30 DIAGNOSIS — M47.817 LUMBAR AND SACRAL SPONDYLARTHRITIS: ICD-10-CM

## 2023-08-30 PROCEDURE — 73521 X-RAY EXAM HIPS BI 2 VIEWS: CPT | Mod: 26,,, | Performed by: RADIOLOGY

## 2023-08-30 PROCEDURE — 73000 X-RAY EXAM OF COLLAR BONE: CPT | Mod: TC,FY,PO,RT

## 2023-08-30 PROCEDURE — 72100 X-RAY EXAM L-S SPINE 2/3 VWS: CPT | Mod: TC,FY,PO

## 2023-08-30 PROCEDURE — 72100 X-RAY EXAM L-S SPINE 2/3 VWS: CPT | Mod: 26,,, | Performed by: RADIOLOGY

## 2023-08-30 PROCEDURE — 72100 XR LUMBAR SPINE 2 OR 3 VIEWS: ICD-10-PCS | Mod: 26,,, | Performed by: RADIOLOGY

## 2023-08-30 PROCEDURE — 73521 X-RAY EXAM HIPS BI 2 VIEWS: CPT | Mod: TC,FY,PO

## 2023-08-30 PROCEDURE — 73000 X-RAY EXAM OF COLLAR BONE: CPT | Mod: 26,RT,, | Performed by: RADIOLOGY

## 2023-08-30 PROCEDURE — 73521 XR HIPS BILATERAL 2 VIEW INCL AP PELVIS: ICD-10-PCS | Mod: 26,,, | Performed by: RADIOLOGY

## 2023-08-30 PROCEDURE — 73030 XR SHOULDER COMPLETE 2 OR MORE VIEWS RIGHT: ICD-10-PCS | Mod: 26,RT,, | Performed by: RADIOLOGY

## 2023-08-30 PROCEDURE — 73000 XR CLAVICLE RIGHT: ICD-10-PCS | Mod: 26,RT,, | Performed by: RADIOLOGY

## 2023-08-30 PROCEDURE — 73030 X-RAY EXAM OF SHOULDER: CPT | Mod: TC,FY,PO,RT

## 2023-08-30 PROCEDURE — 73030 X-RAY EXAM OF SHOULDER: CPT | Mod: 26,RT,, | Performed by: RADIOLOGY

## 2023-08-30 NOTE — TELEPHONE ENCOUNTER
----- Message from Lynne Real sent at 8/30/2023  2:20 PM CDT -----  Type:  Needs Medical Advice    Who Called: pt   Best Call Back Number: [phone]    Additional Information:  Requesting call back wants to get xray order for neck .. At the office now waiting on orders     please advise thank you

## 2023-10-17 ENCOUNTER — OFFICE VISIT (OUTPATIENT)
Dept: URGENT CARE | Facility: CLINIC | Age: 78
End: 2023-10-17
Payer: MEDICARE

## 2023-10-17 VITALS
DIASTOLIC BLOOD PRESSURE: 88 MMHG | WEIGHT: 148.81 LBS | HEART RATE: 67 BPM | TEMPERATURE: 98 F | BODY MASS INDEX: 26.37 KG/M2 | OXYGEN SATURATION: 95 % | SYSTOLIC BLOOD PRESSURE: 163 MMHG | HEIGHT: 63 IN | RESPIRATION RATE: 17 BRPM

## 2023-10-17 DIAGNOSIS — R03.0 ELEVATED BLOOD PRESSURE READING: Primary | ICD-10-CM

## 2023-10-17 DIAGNOSIS — I10 HYPERTENSION, UNSPECIFIED TYPE: ICD-10-CM

## 2023-10-17 PROCEDURE — 99213 OFFICE O/P EST LOW 20 MIN: CPT | Mod: S$GLB,,, | Performed by: PHYSICIAN ASSISTANT

## 2023-10-17 PROCEDURE — 99213 PR OFFICE/OUTPT VISIT, EST, LEVL III, 20-29 MIN: ICD-10-PCS | Mod: S$GLB,,, | Performed by: PHYSICIAN ASSISTANT

## 2023-10-17 RX ORDER — HYDROCHLOROTHIAZIDE 25 MG/1
25 TABLET ORAL DAILY
Qty: 30 TABLET | Refills: 0 | Status: SHIPPED | OUTPATIENT
Start: 2023-10-17 | End: 2023-11-17

## 2023-10-17 RX ORDER — BENAZEPRIL HYDROCHLORIDE 20 MG/1
20 TABLET ORAL DAILY
Qty: 30 TABLET | Refills: 0 | Status: SHIPPED | OUTPATIENT
Start: 2023-10-17 | End: 2023-11-17

## 2023-10-17 NOTE — PROGRESS NOTES
"Subjective:      Patient ID: Pauline Rodriguez is a 78 y.o. female.    Vitals:  height is 5' 2.6" (1.59 m) and weight is 67.5 kg (148 lb 12.8 oz). Her oral temperature is 98.4 °F (36.9 °C). Her blood pressure is 163/88 (abnormal) and her pulse is 67. Her respiration is 17 and oxygen saturation is 95%.     Chief Complaint: Other Misc (Blood pressure high. - Entered by patient)    Pt. Presents with high blood pressure 200/90. States she's been out of BP medication for several months.        Constitution: Negative for fatigue.   Cardiovascular:  Negative for chest pain, leg swelling and palpitations.   Respiratory:  Negative for cough and shortness of breath.    Genitourinary:  Negative for dysuria, frequency, urgency and urine decreased.   Neurological:  Negative for dizziness, passing out and speech difficulty.      Objective:     Physical Exam   Constitutional: She is oriented to person, place, and time. She does not appear ill. No distress.   HENT:   Head: Normocephalic and atraumatic.   Ears:   Right Ear: External ear normal.   Left Ear: External ear normal.   Eyes: Conjunctivae are normal. Right eye exhibits no discharge. Left eye exhibits no discharge. Extraocular movement intact   Cardiovascular: Normal rate, regular rhythm and normal heart sounds.   No murmur heard.  Pulmonary/Chest: Effort normal. No respiratory distress.   Abdominal: Normal appearance.   Musculoskeletal: Normal range of motion.         General: Normal range of motion.      Right lower leg: No edema.      Left lower leg: No edema.   Neurological: no focal deficit. She is alert and oriented to person, place, and time. No cranial nerve deficit. Gait normal.   Skin: Skin is warm, dry and not pale. jaundice  Psychiatric: Her behavior is normal. Mood, judgment and thought content normal.   Nursing note and vitals reviewed.      Assessment:     1. Elevated blood pressure reading    2. Hypertension, unspecified type        Plan:       Elevated blood " pressure reading    Hypertension, unspecified type    Other orders  -     benazepriL (LOTENSIN) 20 MG tablet; Take 1 tablet (20 mg total) by mouth once daily.  Dispense: 30 tablet; Refill: 0  -     hydroCHLOROthiazide (HYDRODIURIL) 25 MG tablet; Take 1 tablet (25 mg total) by mouth once daily.  Dispense: 30 tablet; Refill: 0    Advised follow-up within the next 30 days with PCP.    Patient Instructions   You must understand that you've received an Urgent Care treatment only and that you may be released before all your medical problems are known or treated. You, the patient, will arrange for follow up care as instructed.  Follow up with your PCP or specialty clinic as directed in the next 1-2 weeks if not improved or as needed.  You can call [phone] to schedule an appointment with the appropriate provider.  If your condition worsens we recommend that you receive another evaluation at the emergency room immediately or contact your primary medical clinics after hours call service to discuss your concerns.  Please return here or go to the Emergency Department for any concerns or worsening of condition.

## 2023-10-17 NOTE — PATIENT INSTRUCTIONS

## 2023-10-25 ENCOUNTER — PATIENT OUTREACH (OUTPATIENT)
Dept: ADMINISTRATIVE | Facility: HOSPITAL | Age: 78
End: 2023-10-25
Payer: MEDICARE

## 2023-10-25 NOTE — PROGRESS NOTES

## 2023-11-03 ENCOUNTER — TELEPHONE (OUTPATIENT)
Dept: FAMILY MEDICINE | Facility: CLINIC | Age: 78
End: 2023-11-03
Payer: MEDICARE

## 2023-11-03 NOTE — TELEPHONE ENCOUNTER
Spoke to pt, rescheduled 11/7/23 appt with Marta Toledo.  Got pt rescheduled for 11/13/23 with ISA Doss.

## 2023-11-07 ENCOUNTER — TELEPHONE (OUTPATIENT)
Dept: FAMILY MEDICINE | Facility: CLINIC | Age: 78
End: 2023-11-07
Payer: MEDICARE

## 2023-11-13 ENCOUNTER — OFFICE VISIT (OUTPATIENT)
Dept: OBSTETRICS AND GYNECOLOGY | Facility: CLINIC | Age: 78
End: 2023-11-13
Payer: MEDICARE

## 2023-11-13 VITALS
HEIGHT: 63 IN | WEIGHT: 145.5 LBS | DIASTOLIC BLOOD PRESSURE: 74 MMHG | SYSTOLIC BLOOD PRESSURE: 106 MMHG | BODY MASS INDEX: 25.78 KG/M2

## 2023-11-13 DIAGNOSIS — R10.2 PELVIC PAIN: Primary | ICD-10-CM

## 2023-11-13 DIAGNOSIS — S30.814A ABRASION OF VAGINA, INITIAL ENCOUNTER: ICD-10-CM

## 2023-11-13 PROCEDURE — 99999 PR PBB SHADOW E&M-EST. PATIENT-LVL IV: ICD-10-PCS | Mod: PBBFAC,,, | Performed by: SPECIALIST

## 2023-11-13 PROCEDURE — 3288F FALL RISK ASSESSMENT DOCD: CPT | Mod: CPTII,S$GLB,, | Performed by: SPECIALIST

## 2023-11-13 PROCEDURE — 3078F DIAST BP <80 MM HG: CPT | Mod: CPTII,S$GLB,, | Performed by: SPECIALIST

## 2023-11-13 PROCEDURE — 99999 PR PBB SHADOW E&M-EST. PATIENT-LVL IV: CPT | Mod: PBBFAC,,, | Performed by: SPECIALIST

## 2023-11-13 PROCEDURE — 99203 PR OFFICE/OUTPT VISIT, NEW, LEVL III, 30-44 MIN: ICD-10-PCS | Mod: S$GLB,,, | Performed by: SPECIALIST

## 2023-11-13 PROCEDURE — 99203 OFFICE O/P NEW LOW 30 MIN: CPT | Mod: S$GLB,,, | Performed by: SPECIALIST

## 2023-11-13 PROCEDURE — 1101F PR PT FALLS ASSESS DOC 0-1 FALLS W/OUT INJ PAST YR: ICD-10-PCS | Mod: CPTII,S$GLB,, | Performed by: SPECIALIST

## 2023-11-13 PROCEDURE — 3074F SYST BP LT 130 MM HG: CPT | Mod: CPTII,S$GLB,, | Performed by: SPECIALIST

## 2023-11-13 PROCEDURE — 3074F PR MOST RECENT SYSTOLIC BLOOD PRESSURE < 130 MM HG: ICD-10-PCS | Mod: CPTII,S$GLB,, | Performed by: SPECIALIST

## 2023-11-13 PROCEDURE — 1101F PT FALLS ASSESS-DOCD LE1/YR: CPT | Mod: CPTII,S$GLB,, | Performed by: SPECIALIST

## 2023-11-13 PROCEDURE — 1159F PR MEDICATION LIST DOCUMENTED IN MEDICAL RECORD: ICD-10-PCS | Mod: CPTII,S$GLB,, | Performed by: SPECIALIST

## 2023-11-13 PROCEDURE — 1125F AMNT PAIN NOTED PAIN PRSNT: CPT | Mod: CPTII,S$GLB,, | Performed by: SPECIALIST

## 2023-11-13 PROCEDURE — 3288F PR FALLS RISK ASSESSMENT DOCUMENTED: ICD-10-PCS | Mod: CPTII,S$GLB,, | Performed by: SPECIALIST

## 2023-11-13 PROCEDURE — 1125F PR PAIN SEVERITY QUANTIFIED, PAIN PRESENT: ICD-10-PCS | Mod: CPTII,S$GLB,, | Performed by: SPECIALIST

## 2023-11-13 PROCEDURE — 1159F MED LIST DOCD IN RCRD: CPT | Mod: CPTII,S$GLB,, | Performed by: SPECIALIST

## 2023-11-13 PROCEDURE — 3078F PR MOST RECENT DIASTOLIC BLOOD PRESSURE < 80 MM HG: ICD-10-PCS | Mod: CPTII,S$GLB,, | Performed by: SPECIALIST

## 2023-11-13 RX ORDER — ESTRADIOL 0.1 MG/G
1 CREAM VAGINAL DAILY
Qty: 42.5 G | Refills: 1 | Status: SHIPPED | OUTPATIENT
Start: 2023-11-13 | End: 2024-02-01

## 2023-11-13 NOTE — PROGRESS NOTES
77 yo WF, history hyst present for eval vaginal spotting recently after apparently washing are and and scarping with her finger  denies f/c, dysuria, hematuria, pain, hematuria  Past Medical History:   Diagnosis Date    Essential hypertension        Past Surgical History:   Procedure Laterality Date    CYST REMOVAL      HYSTERECTOMY      JOINT REPLACEMENT Right 07/01/2021    Hip replacement       Family History   Problem Relation Age of Onset    Diabetes Maternal Aunt     Breast cancer Neg Hx     Ovarian cancer Neg Hx        Social History     Socioeconomic History    Marital status:    Tobacco Use    Smoking status: Never    Smokeless tobacco: Never   Substance and Sexual Activity    Alcohol use: No    Drug use: Never    Sexual activity: Not Currently     Birth control/protection: See Surgical Hx       Current Outpatient Medications   Medication Sig Dispense Refill    benazepriL (LOTENSIN) 20 MG tablet Take 1 tablet (20 mg total) by mouth once daily. 30 tablet 0    dorzolamide-timolol 2-0.5% (COSOPT) 22.3-6.8 mg/mL ophthalmic solution Place 1 drop into both eyes once daily.       famotidine (PEPCID) 20 MG tablet Take 1 tablet (20 mg total) by mouth 2 (two) times daily. 180 tablet 3    hydroCHLOROthiazide (HYDRODIURIL) 25 MG tablet Take 1 tablet (25 mg total) by mouth once daily. 30 tablet 0    ibuprofen (ADVIL,MOTRIN) 800 MG tablet Take 1 tablet (800 mg total) by mouth 2 (two) times a day. 180 tablet 1    latanoprost 0.005 % ophthalmic solution Place 1 drop into both eyes once daily.       RESTASIS 0.05 % ophthalmic emulsion Place 1 drop into both eyes 2 (two) times daily.       estradioL (ESTRACE) 0.01 % (0.1 mg/gram) vaginal cream Place 1 g vaginally once daily. 42.5 g 1     No current facility-administered medications for this visit.       Review of patient's allergies indicates:   Allergen Reactions    Latex, natural rubber Dermatitis       Review of System:   General: no chills, fever, night sweats,  weight gain or weight loss  Psychological: no depression or suicidal ideation  Breasts: no new or changing breast lumps, nipple discharge or masses.  Respiratory: no cough, shortness of breath, or wheezing  Cardiovascular: no chest pain or dyspnea on exertion  Gastrointestinal: no abdominal pain, change in bowel habits, or black or bloody stools  Genito-Urinary: no incontinence, urinary frequency/urgency or vulvar/vaginal symptoms, pelvic pain or abnormal vaginal bleeding. AS ABOVE  Musculoskeletal: no gait disturbance or muscular weakness     PE:   APPEARANCE: Well nourished, well developed, in no acute distress.  NECK: Neck symmetric without masses or thyromegaly.  NODES: No inguinal lymph node enlargement.  ABDOMEN: Soft. No tenderness or masses. No hepatosplenomegaly. No hernias.  BREASTS: deferred    PELVIC: Normal external female genitalia without lesions. Normal hair distribution. Adequate perineal body, normal urethral meatus. Vagina dry and atrophic  An abrasion at the right periurethral region is noted. without lesions or discharge. No significant cystocele or rectocele. Uterus and cervix surgically absent. Bimanual exam revealed no masses, tenderness or abnormality.    I discussed atrophy and abrasion and rec topical E2 twice daily x 2 weeks  Will in addition, obtain pelvic u/s for c/o nonspecific pelvic discomfort    I spent a total of 30 minutes on the day of the visit. This includes face to face time and non-face to face time preparing to see the patient (eg, review of tests), obtaining and/or reviewing separately obtained history, documenting clinical information in the electronic or other health record, independently interpreting results and communicating results to the patient/family/caregiver, or care coordinator.       NOTE  NURSING PERSONAL PRESENT FOR ENTIRE PHYSICAL EXAM

## 2023-11-17 ENCOUNTER — LAB VISIT (OUTPATIENT)
Dept: LAB | Facility: HOSPITAL | Age: 78
End: 2023-11-17
Attending: PHYSICIAN ASSISTANT
Payer: MEDICARE

## 2023-11-17 ENCOUNTER — OFFICE VISIT (OUTPATIENT)
Dept: FAMILY MEDICINE | Facility: CLINIC | Age: 78
End: 2023-11-17
Payer: MEDICARE

## 2023-11-17 ENCOUNTER — TELEPHONE (OUTPATIENT)
Dept: FAMILY MEDICINE | Facility: CLINIC | Age: 78
End: 2023-11-17

## 2023-11-17 VITALS
HEART RATE: 75 BPM | OXYGEN SATURATION: 99 % | BODY MASS INDEX: 25.94 KG/M2 | DIASTOLIC BLOOD PRESSURE: 76 MMHG | WEIGHT: 146.38 LBS | HEIGHT: 63 IN | SYSTOLIC BLOOD PRESSURE: 116 MMHG

## 2023-11-17 DIAGNOSIS — N76.0 BV (BACTERIAL VAGINOSIS): Primary | ICD-10-CM

## 2023-11-17 DIAGNOSIS — N30.00 ACUTE CYSTITIS WITHOUT HEMATURIA: ICD-10-CM

## 2023-11-17 DIAGNOSIS — I10 PRIMARY HYPERTENSION: ICD-10-CM

## 2023-11-17 DIAGNOSIS — R30.0 DYSURIA: Primary | ICD-10-CM

## 2023-11-17 DIAGNOSIS — B96.89 BV (BACTERIAL VAGINOSIS): Primary | ICD-10-CM

## 2023-11-17 DIAGNOSIS — R10.2 PELVIC PAIN: ICD-10-CM

## 2023-11-17 PROBLEM — E87.6 HYPOKALEMIA: Status: RESOLVED | Noted: 2017-07-24 | Resolved: 2023-11-17

## 2023-11-17 LAB
ALBUMIN SERPL BCP-MCNC: 4 G/DL (ref 3.5–5.2)
ALP SERPL-CCNC: 94 U/L (ref 55–135)
ALT SERPL W/O P-5'-P-CCNC: 8 U/L (ref 10–44)
ANION GAP SERPL CALC-SCNC: 9 MMOL/L (ref 8–16)
AST SERPL-CCNC: 19 U/L (ref 10–40)
BACTERIA #/AREA URNS HPF: ABNORMAL /HPF
BACTERIA GENITAL QL WET PREP: ABNORMAL
BASOPHILS # BLD AUTO: 0.04 K/UL (ref 0–0.2)
BASOPHILS NFR BLD: 0.5 % (ref 0–1.9)
BILIRUB SERPL-MCNC: 1.6 MG/DL (ref 0.1–1)
BILIRUB UR QL STRIP: ABNORMAL
BUN SERPL-MCNC: 24 MG/DL (ref 8–23)
CALCIUM SERPL-MCNC: 9.8 MG/DL (ref 8.7–10.5)
CHLORIDE SERPL-SCNC: 104 MMOL/L (ref 95–110)
CLARITY UR: CLEAR
CLUE CELLS VAG QL WET PREP: ABNORMAL
CO2 SERPL-SCNC: 30 MMOL/L (ref 23–29)
COLOR UR: YELLOW
CREAT SERPL-MCNC: 1.1 MG/DL (ref 0.5–1.4)
DIFFERENTIAL METHOD: NORMAL
EOSINOPHIL # BLD AUTO: 0.1 K/UL (ref 0–0.5)
EOSINOPHIL NFR BLD: 1.8 % (ref 0–8)
ERYTHROCYTE [DISTWIDTH] IN BLOOD BY AUTOMATED COUNT: 13 % (ref 11.5–14.5)
EST. GFR  (NO RACE VARIABLE): 51.4 ML/MIN/1.73 M^2
FILAMENT FUNGI VAG WET PREP-#/AREA: ABNORMAL
GLUCOSE SERPL-MCNC: 96 MG/DL (ref 70–110)
GLUCOSE UR QL STRIP: NEGATIVE
HCT VFR BLD AUTO: 37.2 % (ref 37–48.5)
HGB BLD-MCNC: 12 G/DL (ref 12–16)
HGB UR QL STRIP: ABNORMAL
HYALINE CASTS #/AREA URNS LPF: 0 /LPF
IMM GRANULOCYTES # BLD AUTO: 0.02 K/UL (ref 0–0.04)
IMM GRANULOCYTES NFR BLD AUTO: 0.3 % (ref 0–0.5)
KETONES UR QL STRIP: ABNORMAL
LEUKOCYTE ESTERASE UR QL STRIP: ABNORMAL
LYMPHOCYTES # BLD AUTO: 2.1 K/UL (ref 1–4.8)
LYMPHOCYTES NFR BLD: 25.8 % (ref 18–48)
MCH RBC QN AUTO: 28.9 PG (ref 27–31)
MCHC RBC AUTO-ENTMCNC: 32.3 G/DL (ref 32–36)
MCV RBC AUTO: 90 FL (ref 82–98)
MICROSCOPIC COMMENT: ABNORMAL
MONOCYTES # BLD AUTO: 0.7 K/UL (ref 0.3–1)
MONOCYTES NFR BLD: 9.2 % (ref 4–15)
NEUTROPHILS # BLD AUTO: 5 K/UL (ref 1.8–7.7)
NEUTROPHILS NFR BLD: 62.4 % (ref 38–73)
NITRITE UR QL STRIP: NEGATIVE
NRBC BLD-RTO: 0 /100 WBC
PH UR STRIP: 6 [PH] (ref 5–8)
PLATELET # BLD AUTO: 263 K/UL (ref 150–450)
PMV BLD AUTO: 10.6 FL (ref 9.2–12.9)
POTASSIUM SERPL-SCNC: 3.3 MMOL/L (ref 3.5–5.1)
PROT SERPL-MCNC: 7.3 G/DL (ref 6–8.4)
PROT UR QL STRIP: ABNORMAL
RBC # BLD AUTO: 4.15 M/UL (ref 4–5.4)
RBC #/AREA URNS HPF: 10 /HPF (ref 0–4)
SODIUM SERPL-SCNC: 143 MMOL/L (ref 136–145)
SP GR UR STRIP: 1.02 (ref 1–1.03)
SPECIMEN SOURCE: ABNORMAL
SQUAMOUS #/AREA URNS HPF: 8 /HPF
T VAGINALIS GENITAL QL WET PREP: ABNORMAL
URN SPEC COLLECT METH UR: ABNORMAL
WBC # BLD AUTO: 7.96 K/UL (ref 3.9–12.7)
WBC #/AREA URNS HPF: 80 /HPF (ref 0–5)
WBC #/AREA VAG WET PREP: ABNORMAL
YEAST GENITAL QL WET PREP: ABNORMAL

## 2023-11-17 PROCEDURE — 1101F PT FALLS ASSESS-DOCD LE1/YR: CPT | Mod: CPTII,S$GLB,, | Performed by: PHYSICIAN ASSISTANT

## 2023-11-17 PROCEDURE — 99999 PR PBB SHADOW E&M-EST. PATIENT-LVL IV: ICD-10-PCS | Mod: PBBFAC,,, | Performed by: PHYSICIAN ASSISTANT

## 2023-11-17 PROCEDURE — 3078F DIAST BP <80 MM HG: CPT | Mod: CPTII,S$GLB,, | Performed by: PHYSICIAN ASSISTANT

## 2023-11-17 PROCEDURE — 3288F FALL RISK ASSESSMENT DOCD: CPT | Mod: CPTII,S$GLB,, | Performed by: PHYSICIAN ASSISTANT

## 2023-11-17 PROCEDURE — 36415 COLL VENOUS BLD VENIPUNCTURE: CPT | Mod: PO | Performed by: PHYSICIAN ASSISTANT

## 2023-11-17 PROCEDURE — 1160F RVW MEDS BY RX/DR IN RCRD: CPT | Mod: CPTII,S$GLB,, | Performed by: PHYSICIAN ASSISTANT

## 2023-11-17 PROCEDURE — 99214 PR OFFICE/OUTPT VISIT, EST, LEVL IV, 30-39 MIN: ICD-10-PCS | Mod: S$GLB,,, | Performed by: PHYSICIAN ASSISTANT

## 2023-11-17 PROCEDURE — 1159F MED LIST DOCD IN RCRD: CPT | Mod: CPTII,S$GLB,, | Performed by: PHYSICIAN ASSISTANT

## 2023-11-17 PROCEDURE — 87210 SMEAR WET MOUNT SALINE/INK: CPT | Mod: PO | Performed by: PHYSICIAN ASSISTANT

## 2023-11-17 PROCEDURE — 3074F SYST BP LT 130 MM HG: CPT | Mod: CPTII,S$GLB,, | Performed by: PHYSICIAN ASSISTANT

## 2023-11-17 PROCEDURE — 87086 URINE CULTURE/COLONY COUNT: CPT | Performed by: PHYSICIAN ASSISTANT

## 2023-11-17 PROCEDURE — 87186 SC STD MICRODIL/AGAR DIL: CPT | Performed by: PHYSICIAN ASSISTANT

## 2023-11-17 PROCEDURE — 99999 PR PBB SHADOW E&M-EST. PATIENT-LVL IV: CPT | Mod: PBBFAC,,, | Performed by: PHYSICIAN ASSISTANT

## 2023-11-17 PROCEDURE — 1126F PR PAIN SEVERITY QUANTIFIED, NO PAIN PRESENT: ICD-10-PCS | Mod: CPTII,S$GLB,, | Performed by: PHYSICIAN ASSISTANT

## 2023-11-17 PROCEDURE — 3078F PR MOST RECENT DIASTOLIC BLOOD PRESSURE < 80 MM HG: ICD-10-PCS | Mod: CPTII,S$GLB,, | Performed by: PHYSICIAN ASSISTANT

## 2023-11-17 PROCEDURE — 1160F PR REVIEW ALL MEDS BY PRESCRIBER/CLIN PHARMACIST DOCUMENTED: ICD-10-PCS | Mod: CPTII,S$GLB,, | Performed by: PHYSICIAN ASSISTANT

## 2023-11-17 PROCEDURE — 3074F PR MOST RECENT SYSTOLIC BLOOD PRESSURE < 130 MM HG: ICD-10-PCS | Mod: CPTII,S$GLB,, | Performed by: PHYSICIAN ASSISTANT

## 2023-11-17 PROCEDURE — 80053 COMPREHEN METABOLIC PANEL: CPT | Performed by: PHYSICIAN ASSISTANT

## 2023-11-17 PROCEDURE — 87088 URINE BACTERIA CULTURE: CPT | Performed by: PHYSICIAN ASSISTANT

## 2023-11-17 PROCEDURE — 1159F PR MEDICATION LIST DOCUMENTED IN MEDICAL RECORD: ICD-10-PCS | Mod: CPTII,S$GLB,, | Performed by: PHYSICIAN ASSISTANT

## 2023-11-17 PROCEDURE — 87077 CULTURE AEROBIC IDENTIFY: CPT | Performed by: PHYSICIAN ASSISTANT

## 2023-11-17 PROCEDURE — 3288F PR FALLS RISK ASSESSMENT DOCUMENTED: ICD-10-PCS | Mod: CPTII,S$GLB,, | Performed by: PHYSICIAN ASSISTANT

## 2023-11-17 PROCEDURE — 81000 URINALYSIS NONAUTO W/SCOPE: CPT | Mod: PO | Performed by: PHYSICIAN ASSISTANT

## 2023-11-17 PROCEDURE — 99214 OFFICE O/P EST MOD 30 MIN: CPT | Mod: S$GLB,,, | Performed by: PHYSICIAN ASSISTANT

## 2023-11-17 PROCEDURE — 85025 COMPLETE CBC W/AUTO DIFF WBC: CPT | Performed by: PHYSICIAN ASSISTANT

## 2023-11-17 PROCEDURE — 1126F AMNT PAIN NOTED NONE PRSNT: CPT | Mod: CPTII,S$GLB,, | Performed by: PHYSICIAN ASSISTANT

## 2023-11-17 PROCEDURE — 1101F PR PT FALLS ASSESS DOC 0-1 FALLS W/OUT INJ PAST YR: ICD-10-PCS | Mod: CPTII,S$GLB,, | Performed by: PHYSICIAN ASSISTANT

## 2023-11-17 RX ORDER — METRONIDAZOLE 500 MG/1
500 TABLET ORAL EVERY 12 HOURS
Qty: 14 TABLET | Refills: 0 | Status: SHIPPED | OUTPATIENT
Start: 2023-11-17 | End: 2023-11-24

## 2023-11-17 RX ORDER — BENAZEPRIL/HYDROCHLOROTHIAZIDE 20 MG-25MG
1 TABLET ORAL DAILY
Qty: 90 TABLET | Refills: 3 | Status: SHIPPED | OUTPATIENT
Start: 2023-11-17 | End: 2024-11-16

## 2023-11-17 RX ORDER — BENAZEPRIL/HYDROCHLOROTHIAZIDE 20 MG-25MG
1 TABLET ORAL DAILY
Qty: 90 TABLET | Refills: 3 | Status: SHIPPED | OUTPATIENT
Start: 2023-11-17 | End: 2023-11-17 | Stop reason: SDUPTHER

## 2023-11-17 RX ORDER — CEFDINIR 300 MG/1
300 CAPSULE ORAL 2 TIMES DAILY
Qty: 14 CAPSULE | Refills: 0 | Status: SHIPPED | OUTPATIENT
Start: 2023-11-17 | End: 2023-11-24

## 2023-11-17 NOTE — PATIENT INSTRUCTIONS
A few reminders from today:    Start combo blood pressure medication  Urine sample today  Vaginal swab today    Follow up with me if needed.   Please go to ER/urgent care if after hours or symptoms persist/worsen.     Do not hesitate to get in touch with me should you have any further questions.     Thank you for trusting me with your care!  I wish you health and happiness.    -Lupis Estes PA-C]\

## 2023-11-17 NOTE — TELEPHONE ENCOUNTER
Spoke to patient about results.  Patient was informed to take antibiotics and it was sent to [facility] pharmacy  Understanding was verbalized.

## 2023-11-17 NOTE — PROGRESS NOTES
Subjective     Patient ID: Pauline Rodriguez is a 78 y.o. female.    Chief Complaint: Medication Refill      HPI      Pt is new to me, PCP Dr. Owens.    Pt is a 78 year old female with HTN. She presents today to discuss her BP medication. Went multiple months without it. Went to urgent care last month and was given thirty day supply. Feeling well on current dose without lightheadedness.     Pt also complains of pelvic discomfort and pressure x 3-4 days. Went to Dr. Carlos earlier in the week, as this all started when she accidentally scratched her vagina while bathing. Was diagnosed with vaginal abrasion and given progesterone cream. The pain and bleeding from that has improved, but pelvic pressure and dysuria persist. No obvious discharge, but states she notices a foul vaginal odor.     Review of Systems   All other systems reviewed and are negative.         Objective     Physical Exam  Constitutional:       General: She is not in acute distress.     Appearance: Normal appearance. She is not ill-appearing, toxic-appearing or diaphoretic.   HENT:      Head: Normocephalic and atraumatic.   Cardiovascular:      Heart sounds: Normal heart sounds.   Pulmonary:      Effort: No respiratory distress.      Breath sounds: Normal breath sounds.   Abdominal:      General: Abdomen is flat. Bowel sounds are normal. There is no distension.      Palpations: There is no mass.      Tenderness: There is abdominal tenderness (mild LLQ tenderness). There is no right CVA tenderness, left CVA tenderness, guarding or rebound.      Hernia: No hernia is present.   Genitourinary:     Comments: Swabbed with vaginosis swab, no obvious abnormalities on external exam  Neurological:      General: No focal deficit present.      Mental Status: She is alert and oriented to person, place, and time.   Psychiatric:         Mood and Affect: Mood normal.         Behavior: Behavior normal.         Thought Content: Thought content normal.         Judgment:  Judgment normal.       1. Dysuria  - CULTURE, URINE  - Urinalysis  -because location is [facility], recommend mild, low fiber, as close to liquid as possible diet for the next 3-5 days    2. Pelvic pain  - CULTURE, URINE  - Urinalysis  - VAGINOSIS SCREEN BY DNA PROBE    3. Primary hypertension  - CBC W/ AUTO DIFFERENTIAL; Future  - COMPREHENSIVE METABOLIC PANEL; Future  - benazepril-hydrochlorthiazide (LOTENSIN HCT) 20-25 mg Tab; Take 1 tablet by mouth once daily.  Dispense: 90 tablet; Refill: 3        RTC/ER precautions given. F/U if symptoms persist or worsen    Lupis Estes PA-C

## 2023-11-19 LAB
BACTERIA UR CULT: ABNORMAL
BACTERIA UR CULT: ABNORMAL

## 2023-12-20 DIAGNOSIS — Z78.0 MENOPAUSE: ICD-10-CM

## 2024-02-01 ENCOUNTER — OFFICE VISIT (OUTPATIENT)
Dept: FAMILY MEDICINE | Facility: CLINIC | Age: 79
End: 2024-02-01
Payer: MEDICARE

## 2024-02-01 VITALS
DIASTOLIC BLOOD PRESSURE: 80 MMHG | HEIGHT: 62 IN | RESPIRATION RATE: 18 BRPM | OXYGEN SATURATION: 97 % | BODY MASS INDEX: 26.69 KG/M2 | TEMPERATURE: 98 F | WEIGHT: 145.06 LBS | HEART RATE: 62 BPM | SYSTOLIC BLOOD PRESSURE: 136 MMHG

## 2024-02-01 DIAGNOSIS — Z00.01 ANNUAL VISIT FOR GENERAL ADULT MEDICAL EXAMINATION WITH ABNORMAL FINDINGS: Primary | ICD-10-CM

## 2024-02-01 DIAGNOSIS — E78.49 OTHER HYPERLIPIDEMIA: ICD-10-CM

## 2024-02-01 DIAGNOSIS — M45.9 ANKYLOSING SPONDYLITIS, UNSPECIFIED SITE OF SPINE: ICD-10-CM

## 2024-02-01 DIAGNOSIS — I10 ESSENTIAL HYPERTENSION: ICD-10-CM

## 2024-02-01 PROCEDURE — 99397 PER PM REEVAL EST PAT 65+ YR: CPT | Mod: S$GLB,,, | Performed by: FAMILY MEDICINE

## 2024-02-01 PROCEDURE — 3079F DIAST BP 80-89 MM HG: CPT | Mod: CPTII,S$GLB,, | Performed by: FAMILY MEDICINE

## 2024-02-01 PROCEDURE — 1126F AMNT PAIN NOTED NONE PRSNT: CPT | Mod: CPTII,S$GLB,, | Performed by: FAMILY MEDICINE

## 2024-02-01 PROCEDURE — 1159F MED LIST DOCD IN RCRD: CPT | Mod: CPTII,S$GLB,, | Performed by: FAMILY MEDICINE

## 2024-02-01 PROCEDURE — 1101F PT FALLS ASSESS-DOCD LE1/YR: CPT | Mod: CPTII,S$GLB,, | Performed by: FAMILY MEDICINE

## 2024-02-01 PROCEDURE — 3075F SYST BP GE 130 - 139MM HG: CPT | Mod: CPTII,S$GLB,, | Performed by: FAMILY MEDICINE

## 2024-02-01 PROCEDURE — 3288F FALL RISK ASSESSMENT DOCD: CPT | Mod: CPTII,S$GLB,, | Performed by: FAMILY MEDICINE

## 2024-02-01 PROCEDURE — 99999 PR PBB SHADOW E&M-EST. PATIENT-LVL IV: CPT | Mod: PBBFAC,,, | Performed by: FAMILY MEDICINE

## 2024-02-04 NOTE — PROGRESS NOTES
Assessment:       1. Annual visit for general adult medical examination with abnormal findings    2. Essential hypertension    3. Other hyperlipidemia    4. Ankylosing spondylitis, unspecified site of spine        Plan:       Annual visit for general adult medical examination with abnormal findings  -     Comprehensive Metabolic Panel; Future; Expected date: 02/01/2024  -     Lipid Panel; Future; Expected date: 02/01/2024  -     Microalbumin/Creatinine Ratio, Urine; Future; Expected date: 02/01/2024    Essential hypertension:  Stable  -     Microalbumin/Creatinine Ratio, Urine; Future; Expected date: 02/01/2024    Other hyperlipidemia:  Uncontrolled  -     Comprehensive Metabolic Panel; Future; Expected date: 02/01/2024  -     Lipid Panel; Future; Expected date: 02/01/2024    Ankylosing spondylitis, unspecified site of spine:  Stable         Recommend healthy habits, avoid any ultra processed foods.  The patient decline bone density test.  The patient's BMI has been recorded in the chart. The patient has been provided educational materials regarding the benefits of attaining and maintaining a normal weight. We will continue to address and follow this issue during follow up visits.   Patient agreed with assessment and plan. Patient verbalized understanding.     Subjective:       Patient ID: Pauline Rodriguez is a 78 y.o. female.    Chief Complaint: Annual Exam    HPI    Patient here for annual examination, has hypertension, currently taking benazepril hydrochlorothiazide, the patient his hesitant to take any blood work, the last blood work showed presence of abnormal kidney function.  The last cholesterol levels were checked more than 11 years ago and were elevated.  She has ankylosis spondylitis and taking ibuprofen as needed.  She denies any memory problems.  The patient came to the office accompanied by her daughter.    Past medical history, past social history was reviewed and discussed with the patient.    Review of  Systems   Constitutional:  Negative for activity change, appetite change and chills.   HENT:  Negative for congestion and ear discharge.    Eyes:  Negative for discharge and itching.   Respiratory:  Negative for choking and chest tightness.    Cardiovascular:  Negative for chest pain and palpitations.   Gastrointestinal:  Negative for abdominal distention, abdominal pain and constipation.   Endocrine: Negative for cold intolerance and heat intolerance.   Genitourinary:  Negative for dysuria and flank pain.   Musculoskeletal:  Positive for arthralgias and back pain.   Skin:  Negative for pallor and rash.   Allergic/Immunologic: Negative for environmental allergies and food allergies.   Neurological:  Negative for dizziness, facial asymmetry and headaches.   Hematological:  Negative for adenopathy. Does not bruise/bleed easily.   Psychiatric/Behavioral:  Negative for agitation, confusion and decreased concentration.        Objective:      Physical Exam  Vitals and nursing note reviewed.   Constitutional:       General: She is not in acute distress.     Appearance: Normal appearance. She is well-developed. She is not diaphoretic.   HENT:      Head: Normocephalic and atraumatic.      Right Ear: External ear normal.      Left Ear: External ear normal.      Nose: Nose normal.      Mouth/Throat:      Pharynx: No oropharyngeal exudate.   Eyes:      General: No scleral icterus.        Right eye: No discharge.         Left eye: No discharge.      Conjunctiva/sclera: Conjunctivae normal.      Pupils: Pupils are equal, round, and reactive to light.   Cardiovascular:      Rate and Rhythm: Normal rate and regular rhythm.      Heart sounds: Normal heart sounds.   Pulmonary:      Effort: Pulmonary effort is normal. No respiratory distress.      Breath sounds: Normal breath sounds. No wheezing.   Abdominal:      General: Abdomen is flat. Bowel sounds are normal. There is no distension.      Tenderness: There is no abdominal  tenderness.   Musculoskeletal:         General: No tenderness or deformity.      Cervical back: Neck supple.   Skin:     Coloration: Skin is not pale.   Neurological:      Mental Status: She is alert.      Cranial Nerves: No cranial nerve deficit.   Psychiatric:         Behavior: Behavior normal.         Thought Content: Thought content normal.         Judgment: Judgment normal.

## 2024-10-29 ENCOUNTER — OFFICE VISIT (OUTPATIENT)
Dept: FAMILY MEDICINE | Facility: CLINIC | Age: 79
End: 2024-10-29
Payer: MEDICARE

## 2024-10-29 ENCOUNTER — TELEPHONE (OUTPATIENT)
Dept: FAMILY MEDICINE | Facility: CLINIC | Age: 79
End: 2024-10-29
Payer: MEDICARE

## 2024-10-29 VITALS
OXYGEN SATURATION: 99 % | HEART RATE: 59 BPM | BODY MASS INDEX: 24.84 KG/M2 | WEIGHT: 135.81 LBS | DIASTOLIC BLOOD PRESSURE: 78 MMHG | SYSTOLIC BLOOD PRESSURE: 138 MMHG

## 2024-10-29 DIAGNOSIS — M79.602 LEFT ARM PAIN: Primary | ICD-10-CM

## 2024-10-29 PROCEDURE — 1159F MED LIST DOCD IN RCRD: CPT | Mod: CPTII,S$GLB,, | Performed by: PHYSICIAN ASSISTANT

## 2024-10-29 PROCEDURE — 1160F RVW MEDS BY RX/DR IN RCRD: CPT | Mod: CPTII,S$GLB,, | Performed by: PHYSICIAN ASSISTANT

## 2024-10-29 PROCEDURE — 1101F PT FALLS ASSESS-DOCD LE1/YR: CPT | Mod: CPTII,S$GLB,, | Performed by: PHYSICIAN ASSISTANT

## 2024-10-29 PROCEDURE — 99214 OFFICE O/P EST MOD 30 MIN: CPT | Mod: S$GLB,,, | Performed by: PHYSICIAN ASSISTANT

## 2024-10-29 PROCEDURE — 93005 ELECTROCARDIOGRAM TRACING: CPT | Mod: S$GLB,,, | Performed by: PHYSICIAN ASSISTANT

## 2024-10-29 PROCEDURE — 93010 ELECTROCARDIOGRAM REPORT: CPT | Mod: S$GLB,,, | Performed by: INTERNAL MEDICINE

## 2024-10-29 PROCEDURE — 99999 PR PBB SHADOW E&M-EST. PATIENT-LVL III: CPT | Mod: PBBFAC,,, | Performed by: PHYSICIAN ASSISTANT

## 2024-10-29 PROCEDURE — 3075F SYST BP GE 130 - 139MM HG: CPT | Mod: CPTII,S$GLB,, | Performed by: PHYSICIAN ASSISTANT

## 2024-10-29 PROCEDURE — 1125F AMNT PAIN NOTED PAIN PRSNT: CPT | Mod: CPTII,S$GLB,, | Performed by: PHYSICIAN ASSISTANT

## 2024-10-29 PROCEDURE — 3288F FALL RISK ASSESSMENT DOCD: CPT | Mod: CPTII,S$GLB,, | Performed by: PHYSICIAN ASSISTANT

## 2024-10-29 PROCEDURE — 3078F DIAST BP <80 MM HG: CPT | Mod: CPTII,S$GLB,, | Performed by: PHYSICIAN ASSISTANT

## 2024-10-29 RX ORDER — METHOCARBAMOL 500 MG/1
500 TABLET, FILM COATED ORAL NIGHTLY PRN
Qty: 10 TABLET | Refills: 0 | Status: SHIPPED | OUTPATIENT
Start: 2024-10-29 | End: 2024-11-08

## 2024-10-30 LAB
OHS QRS DURATION: 92 MS
OHS QTC CALCULATION: 426 MS

## 2024-11-20 ENCOUNTER — OFFICE VISIT (OUTPATIENT)
Dept: FAMILY MEDICINE | Facility: CLINIC | Age: 79
End: 2024-11-20
Payer: MEDICARE

## 2024-11-20 ENCOUNTER — HOSPITAL ENCOUNTER (OUTPATIENT)
Dept: RADIOLOGY | Facility: HOSPITAL | Age: 79
Discharge: HOME | End: 2024-11-20
Attending: FAMILY MEDICINE
Payer: MEDICARE

## 2024-11-20 VITALS
DIASTOLIC BLOOD PRESSURE: 90 MMHG | SYSTOLIC BLOOD PRESSURE: 152 MMHG | HEART RATE: 62 BPM | WEIGHT: 134.25 LBS | OXYGEN SATURATION: 99 % | HEIGHT: 62 IN | BODY MASS INDEX: 24.7 KG/M2 | RESPIRATION RATE: 18 BRPM

## 2024-11-20 DIAGNOSIS — M25.512 CHRONIC LEFT SHOULDER PAIN: ICD-10-CM

## 2024-11-20 DIAGNOSIS — N18.31 CHRONIC KIDNEY DISEASE, STAGE 3A: ICD-10-CM

## 2024-11-20 DIAGNOSIS — M54.2 NECK PAIN: ICD-10-CM

## 2024-11-20 DIAGNOSIS — G89.29 CHRONIC LEFT SHOULDER PAIN: ICD-10-CM

## 2024-11-20 DIAGNOSIS — M79.602 LEFT ARM PAIN: ICD-10-CM

## 2024-11-20 DIAGNOSIS — I10 ESSENTIAL HYPERTENSION: Primary | ICD-10-CM

## 2024-11-20 PROCEDURE — 73030 X-RAY EXAM OF SHOULDER: CPT | Mod: 26,LT,, | Performed by: RADIOLOGY

## 2024-11-20 PROCEDURE — 72040 X-RAY EXAM NECK SPINE 2-3 VW: CPT | Mod: 26,,, | Performed by: RADIOLOGY

## 2024-11-20 PROCEDURE — 1125F AMNT PAIN NOTED PAIN PRSNT: CPT | Mod: CPTII,S$GLB,, | Performed by: FAMILY MEDICINE

## 2024-11-20 PROCEDURE — 3077F SYST BP >= 140 MM HG: CPT | Mod: CPTII,S$GLB,, | Performed by: FAMILY MEDICINE

## 2024-11-20 PROCEDURE — 99999 PR PBB SHADOW E&M-EST. PATIENT-LVL IV: CPT | Mod: PBBFAC,,, | Performed by: FAMILY MEDICINE

## 2024-11-20 PROCEDURE — 99214 OFFICE O/P EST MOD 30 MIN: CPT | Mod: S$GLB,,, | Performed by: FAMILY MEDICINE

## 2024-11-20 PROCEDURE — 1101F PT FALLS ASSESS-DOCD LE1/YR: CPT | Mod: CPTII,S$GLB,, | Performed by: FAMILY MEDICINE

## 2024-11-20 PROCEDURE — 72040 X-RAY EXAM NECK SPINE 2-3 VW: CPT | Mod: TC,FY,PO

## 2024-11-20 PROCEDURE — 73030 X-RAY EXAM OF SHOULDER: CPT | Mod: TC,FY,PO,LT

## 2024-11-20 PROCEDURE — 3288F FALL RISK ASSESSMENT DOCD: CPT | Mod: CPTII,S$GLB,, | Performed by: FAMILY MEDICINE

## 2024-11-20 PROCEDURE — 3079F DIAST BP 80-89 MM HG: CPT | Mod: CPTII,S$GLB,, | Performed by: FAMILY MEDICINE

## 2024-11-20 RX ORDER — METHYLPREDNISOLONE 4 MG/1
TABLET ORAL
Qty: 1 EACH | Refills: 0 | Status: SHIPPED | OUTPATIENT
Start: 2024-11-20 | End: 2024-12-11

## 2024-11-20 NOTE — PROGRESS NOTES
Assessment:       1. Essential hypertension    2. Left arm pain    3. Chronic kidney disease, stage 3a    4. Neck pain    5. Chronic left shoulder pain        Assessment & Plan    Essential hypertension:  Uncontrolled  -     Lipid Panel; Future; Expected date: 11/20/2024  -     Comprehensive Metabolic Panel; Future; Expected date: 11/20/2024    Left arm pain:  Worsening    Chronic kidney disease, stage 3a:  Stable  -     Comprehensive Metabolic Panel; Future; Expected date: 11/20/2024  -     CBC Auto Differential; Future; Expected date: 11/20/2024    Neck pain:  New problem workup needed  -     X-Ray Cervical Spine 2 or 3 Views; Future; Expected date: 11/20/2024  -     methylPREDNISolone (MEDROL DOSEPACK) 4 mg tablet; use as directed  Dispense: 1 each; Refill: 0  -     Ambulatory referral/consult to Physical/Occupational Therapy; Future; Expected date: 11/27/2024    Chronic left shoulder pain: Worsening  -     X-Ray Shoulder 2 or More Views Left; Future; Expected date: 11/20/2024  -     methylPREDNISolone (MEDROL DOSEPACK) 4 mg tablet; use as directed  Dispense: 1 each; Refill: 0  -     Ambulatory referral/consult to Physical/Occupational Therapy; Future; Expected date: 11/27/2024       Suspected pinched nerve in neck causing arm pain and limited range of motion  Considering steroid package to reduce inflammation and decompress nerve  Avoiding anti-inflammatory medications due to potential impact on kidney function  Anemia noted, requires monitoring  Kidney function declining, necessitates medication adjustments    CHRONIC KIDNEY DISEASE, STAGE 3A:  - Monitored the patient's kidney function, noting a decline since the last labs in May.  - Recent evaluation showed improvement in BUN levels but worsening anemia, with hemoglobin levels dropping from 12.3 to 10.6.  - Ordered non-fasting labs to recheck anemia and kidney function.    GLAUCOMA:  - Continue using glaucoma medications including latanoprost, dorzolamide, and  timolol as prescribed.    PAIN AND LIMITED RANGE OF MOTION:  - Observed significant difference in motion between affected and unaffected arm, noting muscle atrophy.  - Explained muscle weakness is due to lack of movement and potential nerve compression.  - Ordered x-rays of the shoulder.  - Referred patient to physical therapy 2-3 times per week for 6-8 weeks.  - Patient to avoid using left arm for strenuous activities.    PAIN AND SUSPECTED PINCHED NERVE:  - Evaluated neck tenderness and suspected a pinched nerve.  - Explained potential causes: arthritis, posture, repetitive movements, age-related degeneration.  - Ordered x-rays of the neck.  - Referred to physical therapy 2-3 times per week for 6-8 weeks.  - Plan to prescribe steroid package to reduce inflammation.  - Explained potential impact on immune system and relation to flu vaccine timing.  - Consider referral to spine specialist if needed.    DRY EYE SYNDROME:  - Continue using Restasis eye drops as prescribed.    FUNCTION:  - Noted improvement in kidney function but caution needed with medications due to declining function.    HYPERTENSION:  - Blood pressure is 146/90.    VACCINATION:  - Get flu vaccine in right arm after completing steroid treatment.    UP:  - Scheduled in 3 months to assess progress with range of motion and therapy.               Patient agreed with assessment and plan. Patient verbalized understanding.     Subjective:       Patient ID: Pauline Rodriguez is a 79 y.o. female.    Chief Complaint: Arm Pain      History of Present Illness    CHIEF COMPLAINT:  Patient presents with left arm pain and limited range of motion, which has been worsening over time.    HPI:  Patient reports progressive pain in her left arm, extending from the shoulder to the fingers, with particular discomfort in the upper arm region. She has significantly limited range of motion in the left arm compared to the right. Patient also reports occasional neck pain, which  began after the onset of arm pain. She describes a sensation similar to paresthesia but denies numbness.    Patient had an appointment 10 days ago for this issue, where a PA prescribed muscle relaxers. These medications were ineffective and caused side effects including agitation and sleep disturbances. Patient's daughter reports that her mother has a general aversion to taking medications.    Patient reports some pain in the right arm, but it is less severe than the left arm, which she describes as having minimal functionality. The condition has impacted her daily activities, making tasks such as dressing difficult.    Patient had an ER visit in May due to chest pain, which was determined not to be cardiac in origin. Dehydration was suggested as a possible cause.    Patient denies burning sensations or current chest pain in the affected arm.    MEDICATIONS:  Patient is on Lisinopril with hydrochlorothiazide for blood pressure management. She is also using Duaxis for pain, although it has been reported as ineffective. For her eyes, she is using Restasis eye drops, as well as Dorzolamide, Timolol, and Latanoprost eye drops for glaucoma.    MEDICAL HISTORY:  Patient has a history of hypertension, glaucoma, anemia, and kidney function decline.    TEST RESULTS:  In May, the patient's kidney function tests showed improvement in BUN levels. Her hemoglobin levels were 12.3 one year ago and decreased to 10.6 six months ago, indicating anemia. Patient underwent an EKG in May during an ER visit, which yielded normal results.    IMAGING:  On Monday, the patient had a thyroid ultrasound which showed normal results. A submental lymph node was noted but reported as not unusual.      ROS:  ROS as indicated in HPI.          Past medical history, past social history was reviewed and discussed with the patient.        Objective:      Physical Exam      Vitals: Blood pressure: 146/90.  General: No acute distress. Well-developed.  Well-nourished.  Eyes: EOMI. Sclerae anicteric.  HENT: Normocephalic. Atraumatic. Nares patent. Moist oral mucosa.  Cardiovascular: Regular rate. Regular rhythm. Respiratory: Normal respiratory effort. Clear to auscultation bilaterally. No rales. No rhonchi. No wheezing.  Musculoskeletal: No  obvious deformity. Limited range of motion in arm. Tenderness in shoulder area. Limited range of motion in shoulder.  Extremities: No lower extremity edema.  Neurological: Alert & oriented x3. No slurred speech. Normal gait.  Psychiatric: Normal mood. Normal affect. Good insight. Good judgment.  Skin: Warm. Dry. No rash.  Neck: Tenderness in neck area.                   This note was generated with the assistance of ambient listening technology. Verbal consent was obtained by the patient and accompanying visitor(s) for the recording of patient appointment to facilitate this note. I attest to having reviewed and edited the generated note for accuracy, though some syntax or spelling errors may persist. Please contact the author of this note for any clarification.

## 2024-11-28 DIAGNOSIS — E80.6 BILIRUBINEMIA: Primary | ICD-10-CM

## 2024-11-28 DIAGNOSIS — N18.31 STAGE 3A CHRONIC KIDNEY DISEASE: ICD-10-CM

## 2024-11-29 DIAGNOSIS — I10 PRIMARY HYPERTENSION: ICD-10-CM

## 2024-11-29 RX ORDER — BENAZEPRIL HYDROCHLORIDE AND HYDROCHLOROTHIAZIDE 20; 25 MG/1; MG/1
1 TABLET ORAL DAILY
Qty: 90 TABLET | Refills: 3 | Status: SHIPPED | OUTPATIENT
Start: 2024-11-29 | End: 2025-11-29

## 2024-12-05 ENCOUNTER — CLINICAL SUPPORT (OUTPATIENT)
Dept: REHABILITATION | Facility: HOSPITAL | Age: 79
End: 2024-12-05
Attending: FAMILY MEDICINE
Payer: MEDICARE

## 2024-12-05 DIAGNOSIS — M25.512 CHRONIC LEFT SHOULDER PAIN: ICD-10-CM

## 2024-12-05 DIAGNOSIS — R29.898 UPPER EXTREMITY WEAKNESS: ICD-10-CM

## 2024-12-05 DIAGNOSIS — M53.82 DECREASED RANGE OF MOTION OF INTERVERTEBRAL DISCS OF CERVICAL SPINE: Primary | ICD-10-CM

## 2024-12-05 DIAGNOSIS — M54.2 NECK PAIN: ICD-10-CM

## 2024-12-05 DIAGNOSIS — G89.29 CHRONIC LEFT SHOULDER PAIN: ICD-10-CM

## 2024-12-05 PROCEDURE — 97161 PT EVAL LOW COMPLEX 20 MIN: CPT | Mod: PO

## 2024-12-05 PROCEDURE — 97140 MANUAL THERAPY 1/> REGIONS: CPT | Mod: PO

## 2024-12-05 NOTE — PLAN OF CARE
"[facility]   Physical Therapy Initial Evaluation      Name: Pauline Rodriguez  Clinic Number: [id number]    Therapy Diagnosis:   Encounter Diagnoses   Name Primary?    Neck pain     Chronic left shoulder pain     Decreased range of motion of intervertebral discs of cervical spine Yes    Upper extremity weakness         Physician: Jeaneth Baires MD    Physician Orders: PT Eval and Treat   Medical Diagnosis from Referral:   M54.2 (ICD-10-CM) - Neck pain   M25.512,G89.29 (ICD-10-CM) - Chronic left shoulder pain     Evaluation Date: 12/5/2024  Authorization Period Expiration: 11/20/2025  Plan of Care Expiration: 1/30/2025  Progress Note Due: 1/5/2025  Date of Surgery: NA  Visit # / Visits authorized: 1/ 1   FOTO: 1/ 3    Precautions: Standard and Fall     Time In: 1500  Time Out: 1545  Total Billable Time: 45 minutes    Subjective     Date of onset: 2 months    History of current condition - Pat reports: a history of left upper extremity pain that radiates from her shoulder down into her finger tips. She reports attempting muscle relaxors but this was unsuccessful. She reports feeling the pain "anytime I move." She denies any MOIs but endorsed a hx of neck pain.    Falls: none    Imaging: See EPIC    Prior Therapy: none for current condition  Social History: lives with daughter   Occupation: not currently employed  Prior Level of Function: ind  Current Level of Function: ind c pain    Pain:  Current 6/10, worst 10/10, best 5/10   Location: left shoulder    Description: Aching, Throbbing, and Sharp  Aggravating Factors: Extension, Flexing, and Lifting  Easing Factors: nothing    Patients goals: improve function     Medical History:   Past Medical History:   Diagnosis Date    Essential hypertension        Surgical History:   Pauline Rodriguez  has a past surgical history that includes Hysterectomy; Cyst Removal; and Joint replacement (Right, 07/01/2021).    Medications:   Pauline has a current medication " list which includes the following prescription(s): benazepril-hydrochlorthiazide, dorzolamide-timolol 2-0.5%, latanoprost, methylprednisolone, and restasis.    Allergies:   Review of patient's allergies indicates:   Allergen Reactions    Latex, natural rubber Dermatitis        Objective      Posture: FHP and increased thoracic kyphosis    Sensation: SILT                                                                                                                                                                                                                                  Cervical ROM:                                         %                           Pain/dysfunction/movement  Flexion full pain   Extension 50% limit pain   Side-bending Right Full limit pain   Side-bending Left Full limit pain   Right rotation Full none   Left rotation Full  none          Shoulder Range of Motion: unable to assess due to empty end feel with touching of L shoulder    Upper Extremity Strength unable to assess due to empty end feel with touching of L shoulder    Joint Mobility: decreased cervical and L shoulder    Palpation: severe tenderness to palpation of L upper trap and L levator scap      Sensation: SILT      Treatment     Total Treatment time (time-based codes) separate from Evaluation: 15 minutes     Pat received the treatments listed below:      Manual Therapy to improve functional performance for 15  minutes, including:  Very gentle STM to L upper trap and L levator scap          Patient Education and Home Exercises     Education provided:   - on HEP and POC    Written Home Exercises Provided: Yes. Exercises were reviewed and Pat was able to demonstrate them prior to the end of the session.  Pat demonstrated good  understanding of the education provided. See EMR under Patient Instructions for exercises provided during therapy sessions.    Assessment     Pauline is a 79 y.o. female referred to outpatient Physical Therapy  with a medical diagnosis of   M54.2 (ICD-10-CM) - Neck pain   M25.512,G89.29 (ICD-10-CM) - Chronic left shoulder pain   Patient presents with severely increased pain and severely decreased range of motion, strength, and flexibility. These deficits limit the patient from performing everyday activities to include lifting, carrying, bending, reaching, pushing, and pulling without pain or limitations. Pt with possible red flag cardiac symptoms noted today with 2 episodes of angina provoked by anxiety with attempt of L shoulder passive range of motion. Due to inability to move L shoulder today secondary to sever empty end feel, only STM to L upper trap and L levator scap  was able to be tolerated at this time. Pt was educated on need to follow up with MD about chest pains and verbalized understanding. Due to these deficits, pt will benefit from skilled PT services to improve mobility, control pain, and restore overall function.       Patient prognosis is Fair.   Patient will benefit from skilled outpatient Physical Therapy to address the deficits stated above and in the chart below, provide patient /family education, and to maximize patientt's level of independence.     Plan of care discussed with patient: Yes  Patient's spiritual, cultural and educational needs considered and patient is agreeable to the plan of care and goals as stated below:     Anticipated Barriers for therapy: pmhx    Medical Necessity is demonstrated by the following  History  Co-morbidities and personal factors that may impact the plan of care [] LOW: no personal factors / co-morbidities  [x] MODERATE: 1-2 personal factors / co-morbidities  [] HIGH: 3+ personal factors / co-morbidities    Moderate / High Support Documentation:   Co-morbidities affecting plan of care: pmhx    Personal Factors:   no deficits     Examination  Body Structures and Functions, activity limitations and participation restrictions that may impact the plan of care [x] LOW:  addressing 1-2 elements  [] MODERATE: 3+ elements  [] HIGH: 4+ elements (please support below)    Moderate / High Support Documentation:      Clinical Presentation [x] LOW: stable  [] MODERATE: Evolving  [] HIGH: Unstable     Decision Making/ Complexity Score: low       Goals:  Short Term Goals: 4 weeks   Pt to report worst pain 7/10  Pt to improve cervical range of motion by 25%  Pt to tolerate L shoulder range of motion assessment  Pt to tolerate UE strength assessment    Long Term Goals: 8 weeks   LTGs to come pending pt tolerance to further assessment  Plan     Plan of care Certification: 12/5/2024 to 1/30/2025.    Outpatient Physical Therapy 2 times weekly for 8 weeks to include the following interventions: Cervical/Lumbar Traction, Manual Therapy, Moist Heat/ Ice, Neuromuscular Re-ed, Patient Education, Self Care, Therapeutic Activities, and Therapeutic Exercise.       Ernesto Mantilla, PT        Physician's Signature: _________________________________________ Date: ________________

## 2024-12-12 ENCOUNTER — PATIENT MESSAGE (OUTPATIENT)
Dept: FAMILY MEDICINE | Facility: CLINIC | Age: 79
End: 2024-12-12

## 2024-12-12 ENCOUNTER — E-VISIT (OUTPATIENT)
Dept: FAMILY MEDICINE | Facility: CLINIC | Age: 79
End: 2024-12-12
Payer: MEDICARE

## 2024-12-12 ENCOUNTER — PATIENT MESSAGE (OUTPATIENT)
Dept: FAMILY MEDICINE | Facility: CLINIC | Age: 79
End: 2024-12-12
Payer: MEDICARE

## 2024-12-12 DIAGNOSIS — M62.838 MUSCLE SPASM: Primary | ICD-10-CM

## 2024-12-12 RX ORDER — TIZANIDINE 4 MG/1
4 TABLET ORAL EVERY 8 HOURS PRN
Qty: 30 TABLET | Refills: 0 | Status: SHIPPED | OUTPATIENT
Start: 2024-12-12 | End: 2024-12-22

## 2024-12-12 NOTE — PROGRESS NOTES
Patient ID: Pauline Rodriguez is a 79 y.o. female.    Chief Complaint: Joint Pain (Entered automatically based on patient selection in [employer].)    The patient initiated a request through [employer] on 12/12/2024 for evaluation and management with a chief complaint of Joint Pain (Entered automatically based on patient selection in [employer].)     I evaluated the questionnaire submission on 12/12/2024.    [facility] Peq Evisit Joint Pain    12/12/2024  9:55 AM CST - Filed by Lupis Orellana (Proxy)   Do you agree to participate in an E-Visit? Yes   If you have any of the following symptoms, please present to your local emergency room or call 911:  I acknowledge   Choose the state of your primary residence [place]   What is the main issue you would like addressed today? Can i get a muscle relaxer for shoulder you are sending me to PT for?   Do you have any of the following: Following up after treatment change   Provide any additional information you feel is important. This muscle relaxer request is for the muscle in my shoulder. Physical Therapist Ernesto Mantilla is unable to exercise the area due to tightness of muscle and pain. Currently using heating pad, Tylenol, BioFreeze, and massage with no relief.   Please attach any relevant images or files    Are you able to take your vital signs? No   Is the treatment change causing side effects? No side effects   How has the treatment change affected your condition? No change in condition   Are you taking your treatment as prescribed? All of the time   Compared to your last visit, how has your arthritis been? A lot worse   How active was your arthritis during the past week? (range: 0 [Not active at all] - 100 [Extremely active]) 94   In the past 7 days, how would you rate your pain on average? (range: 0 [No pain] - 10 [Worst imaginable pain]) 9   How would you describe your stiffness, on average? Very severe   In the past 7 days, how often did your stiffness interfere with your  activities? Always   In the past 7 days, how long did your stiffness last, on average? More than 2 hours         Encounter Diagnosis   Name Primary?    Muscle spasm Yes        No orders of the defined types were placed in this encounter.     Medications Ordered This Encounter   Medications    tiZANidine (ZANAFLEX) 4 MG tablet     Sig: Take 1 tablet (4 mg total) by mouth every 8 (eight) hours as needed (muscle spasms).     Dispense:  30 tablet     Refill:  0        No follow-ups on file.      E-Visit Time Trackin minutes       Muscle spasm: Worsening  -     tiZANidine (ZANAFLEX) 4 MG tablet; Take 1 tablet (4 mg total) by mouth every 8 (eight) hours as needed (muscle spasms).  Dispense: 30 tablet; Refill: 0     Recommend tizanidine based on the kidney function, try the medication at bedtime 1st if the symptoms are worse, the patient notify us immediately.

## 2024-12-12 NOTE — TELEPHONE ENCOUNTER
LOV 11/20 for these symptoms. Request for additional medication.     EVISIT directed to PCP for follow up

## 2024-12-13 ENCOUNTER — CLINICAL SUPPORT (OUTPATIENT)
Dept: REHABILITATION | Facility: HOSPITAL | Age: 79
End: 2024-12-13
Payer: MEDICARE

## 2024-12-13 DIAGNOSIS — R29.898 UPPER EXTREMITY WEAKNESS: ICD-10-CM

## 2024-12-13 DIAGNOSIS — M53.82 DECREASED RANGE OF MOTION OF INTERVERTEBRAL DISCS OF CERVICAL SPINE: Primary | ICD-10-CM

## 2024-12-13 DIAGNOSIS — I10 PRIMARY HYPERTENSION: ICD-10-CM

## 2024-12-13 PROCEDURE — 97112 NEUROMUSCULAR REEDUCATION: CPT | Mod: PO,CQ

## 2024-12-13 PROCEDURE — 97110 THERAPEUTIC EXERCISES: CPT | Mod: PO,CQ

## 2024-12-13 PROCEDURE — 97140 MANUAL THERAPY 1/> REGIONS: CPT | Mod: PO,CQ

## 2024-12-13 NOTE — TELEPHONE ENCOUNTER
----- Message from Anju sent at 12/13/2024  1:28 PM CST -----  Contact: Breanna partida/Emmy  Pt needs an emergency short script sent over to the following pharm    Type:  RX Refill Request    Who Called:  Lara  Refill or New Rx:  new rx   RX Name and Strength:  benazepril-hydrochlorthiazide (LOTENSIN HCT) 20-25 mg Tab  How is the patient currently taking it? (ex. 1XDay):  as directed   Is this a 30 day or 90 day RX:  15 tabs  Preferred Pharmacy with phone number:    [id number] - [place], [place] - [address]  [facility] [address]  Phone: [phone] Fax: [phone]  Local or Mail Order:  local  Ordering Provider:  Lupis Matthews Call Back Number:  [phone]  Additional Information:  Pt just needs short script sent locally she is out of her medicine, until she gets her mail order script in 8 to 10 days.  thanks

## 2024-12-13 NOTE — TELEPHONE ENCOUNTER
No care due was identified.  [facility] Embedded Care Due Messages. Reference number: [id number].   12/13/2024 3:27:20 PM CST

## 2024-12-13 NOTE — PROGRESS NOTES
"Physical Therapy Daily Treatment Note     Name: Pauline Rodriguez  [facility] Number: [id number]    Therapy Diagnosis:   Encounter Diagnoses   Name Primary?    Decreased range of motion of intervertebral discs of cervical spine Yes    Upper extremity weakness      Physician: Jeaneth Baires MD    Visit Date: 12/13/2024    Physician Orders: PT Eval and Treat   Medical Diagnosis from Referral:   M54.2 (ICD-10-CM) - Neck pain   M25.512,G89.29 (ICD-10-CM) - Chronic left shoulder pain      Evaluation Date: 12/5/2024  Authorization Period Expiration: 11/20/2025  Plan of Care Expiration: 1/30/2025  Progress Note Due: 1/5/2025  Date of Surgery: NA  Visit # / Visits authorized: 1/ 10  FOTO: 1/ 3    Time In: 2:30 pm  Time Out: 3:23 pm  Total Billable Time: 53 minutes    Precautions: Standard and Fall     Subjective     Pt reports: moderate L shoulder/cervical pain levels. She notes tenderness to touch in L UT.    She was compliant with home exercise program.  Response to previous treatment: first follow up  Functional change: TBD    Pain: 4/10  Location: left shoulder      Objective     Objective measures displayed on progress notes unless otherwise noted      Treatment      Pat received therapeutic exercises to develop strength, endurance, ROM, flexibility, posture and core stabilization for 20 minutes including:    UT stretch (L side) 3 x 30"  LS stretch (L side) 3 x30"  Supine pec stretch over towel roll x 2 minutes  Supine thoracic stretch over towel roll x 2 minutes    Pat received the following manual therapy techniques: Joint mobilizations, Manual traction, and Soft tissue Mobilization were applied to the: L UE/cervical paraspinals for 25 minutes, including:    STM to L UT/LS  Suboccipital release  Gentle G/H oscillations     Pat participated in neuromuscular re-education activities to improve: Balance, Coordination, Proprioception and Posture for 08 minutes. The following activities were included:    Scap retraction x 20 5" " "holds  Seated Chin tucks 2 x 10 3" holds    Pat received hot pack for 5 minutes to L UT.    Home Exercises Provided and Patient Education Provided     Education provided:   - HEP review    Written Home Exercises Provided: Patient instructed to cont prior HEP.  Exercises were reviewed and Pat was able to demonstrate them prior to the end of the session.  Pat demonstrated good  understanding of the education provided.     See EMR under Patient Instructions for exercises provided prior visit.    Assessment     Pat returned for her first follow up visit reporting moderate L UT/shoulder pain. Majority of today's treatment consisted on manual therapy techniques in effort to decrease muscular stiffness in L UT. Pt was tender to palpation and had to begin with light STM. Postural mobility exercises introduced to address pt's thoracic kyphosis and rounded shoulder posture. Fair tolerance to today's treatment. Will monitor response to today's visit and attempt to progress appropriately.    Pat Is not progressing well towards her goals.   Pt prognosis is Fair.     Pt will continue to benefit from skilled outpatient physical therapy to address the deficits listed in the problem list box on initial evaluation, provide pt/family education and to maximize pt's level of independence in the home and community environment.     Pt's spiritual, cultural and educational needs considered and pt agreeable to plan of care and goals.     Anticipated barriers to physical therapy: pmhx    Goals: Short Term Goals: 4 weeks   Pt to report worst pain 7/10  Pt to improve cervical range of motion by 25%  Pt to tolerate L shoulder range of motion assessment  Pt to tolerate UE strength assessment     Long Term Goals: 8 weeks   LTGs to come pending pt tolerance to further assessment  Plan      Plan of care Certification: 12/5/2024 to 1/30/2025.     Outpatient Physical Therapy 2 times weekly for 8 weeks to include the following interventions: " Cervical/Lumbar Traction, Manual Therapy, Moist Heat/ Ice, Neuromuscular Re-ed, Patient Education, Self Care, Therapeutic Activities, and Therapeutic Exercise.     Micah Cifuentes, PTA

## 2024-12-13 NOTE — PROGRESS NOTES
"Physical Therapy Daily Treatment Note     Name: Pauline Rodriguez  Clinic Number: [id number]    Therapy Diagnosis:   Encounter Diagnoses   Name Primary?    Decreased range of motion of intervertebral discs of cervical spine Yes    Upper extremity weakness      Physician: Jeaneth Baires MD    Visit Date: 12/13/2024    Physician Orders: PT Eval and Treat   Medical Diagnosis from Referral:   M54.2 (ICD-10-CM) - Neck pain   M25.512,G89.29 (ICD-10-CM) - Chronic left shoulder pain      Evaluation Date: 12/5/2024  Authorization Period Expiration: 11/20/2025  Plan of Care Expiration: 1/30/2025  Progress Note Due: 1/5/2025  Date of Surgery: NA  Visit # / Visits authorized: 1/ 10  FOTO: 1/ 3    Time In: 2:30 pm  Time Out: *** pm  Total Billable Time: *** minutes    Precautions: Standard and Fall     Subjective     Pt reports: ***.  She {Actions; was/was not:93588} compliant with home exercise program.  Response to previous treatment: first follow up  Functional change: TBD    Pain: {0-10:99379::"0"}/10  Location: left shoulder      Objective     Objective measures displayed on progress notes unless otherwise noted      Treatment      Pat received therapeutic exercises to develop strength, endurance, ROM, flexibility, posture and core stabilization for *** minutes including:  Aerobic/Cardio Activity: ***  Stretching/Flexibility exercises: ***  Strengthening Exercises: UE ***                                           LE ***    Pat received the following manual therapy techniques: {AMB PT PROGRESS MANUAL THERAPY:80148} were applied to the: *** for *** minutes, including:  ***    Pat participated in neuromuscular re-education activities to improve: Balance, Coordination, Proprioception and Posture for *** minutes. The following activities were included:  ***    Pat participated in dynamic functional therapeutic activities to improve functional performance for ***  minutes, including:  ***    Pat participated in gait training to improve " functional mobility and safety for ***  minutes, including:  ***    Pat received the following supervised modalities after being cleared for contradictions: {AMB PT SUPERVISED MODES:01006}    Pat received hot pack for *** minutes to ***.    Pat received cold pack for *** minutes to ***.      Home Exercises Provided and Patient Education Provided     Education provided:   - HEP review    Written Home Exercises Provided: Patient instructed to cont prior HEP.  Exercises were reviewed and Pat was able to demonstrate them prior to the end of the session.  Pat demonstrated good  understanding of the education provided.     See EMR under Patient Instructions for exercises provided prior visit.    Assessment     ***  Pat {IS/IS NOT:60515} progressing well towards her goals.   Pt prognosis is Fair.     Pt will continue to benefit from skilled outpatient physical therapy to address the deficits listed in the problem list box on initial evaluation, provide pt/family education and to maximize pt's level of independence in the home and community environment.     Pt's spiritual, cultural and educational needs considered and pt agreeable to plan of care and goals.     Anticipated barriers to physical therapy: pmhx    Goals: Short Term Goals: 4 weeks   Pt to report worst pain 7/10  Pt to improve cervical range of motion by 25%  Pt to tolerate L shoulder range of motion assessment  Pt to tolerate UE strength assessment     Long Term Goals: 8 weeks   LTGs to come pending pt tolerance to further assessment  Plan      Plan of care Certification: 12/5/2024 to 1/30/2025.     Outpatient Physical Therapy 2 times weekly for 8 weeks to include the following interventions: Cervical/Lumbar Traction, Manual Therapy, Moist Heat/ Ice, Neuromuscular Re-ed, Patient Education, Self Care, Therapeutic Activities, and Therapeutic Exercise.     Micah Cifuentes, PTA

## 2024-12-16 RX ORDER — BENAZEPRIL HYDROCHLORIDE AND HYDROCHLOROTHIAZIDE 20; 25 MG/1; MG/1
1 TABLET ORAL DAILY
Qty: 10 TABLET | Refills: 0 | OUTPATIENT
Start: 2024-12-16 | End: 2024-12-26

## 2024-12-17 ENCOUNTER — CLINICAL SUPPORT (OUTPATIENT)
Dept: REHABILITATION | Facility: HOSPITAL | Age: 79
End: 2024-12-17
Payer: MEDICARE

## 2024-12-17 ENCOUNTER — TELEPHONE (OUTPATIENT)
Dept: NEUROSURGERY | Facility: CLINIC | Age: 79
End: 2024-12-17
Payer: MEDICARE

## 2024-12-17 DIAGNOSIS — M53.82 DECREASED RANGE OF MOTION OF INTERVERTEBRAL DISCS OF CERVICAL SPINE: Primary | ICD-10-CM

## 2024-12-17 DIAGNOSIS — R29.898 UPPER EXTREMITY WEAKNESS: ICD-10-CM

## 2024-12-17 PROCEDURE — 97140 MANUAL THERAPY 1/> REGIONS: CPT | Mod: PO

## 2024-12-17 NOTE — PROGRESS NOTES
"Physical Therapy Daily Treatment Note     Name: Pauline Rodriguez  [facility] Number: [id number]    Therapy Diagnosis:   Encounter Diagnoses   Name Primary?    Decreased range of motion of intervertebral discs of cervical spine Yes    Upper extremity weakness      Physician: Jeaneth Baires MD    Visit Date: 12/17/2024    Physician Orders: PT Eval and Treat   Medical Diagnosis from Referral:   M54.2 (ICD-10-CM) - Neck pain   M25.512,G89.29 (ICD-10-CM) - Chronic left shoulder pain      Evaluation Date: 12/5/2024  Authorization Period Expiration: 11/20/2025  Plan of Care Expiration: 1/30/2025  Progress Note Due: 1/5/2025  Date of Surgery: NA  Visit # / Visits authorized: 2/ 10  FOTO: 1/ 3    Time In: 1300 pm  Time Out: 1330 pm  Total Billable Time: 30 minutes    Precautions: Standard and Fall     Subjective     Pt reports: severe neck and shoulder pain that has now radiated to the R. She states she can't continue to bear this pain anymore    She was compliant with home exercise program.  Response to previous treatment: first follow up  Functional change: TBD    Pain: 4/10  Location: left shoulder      Objective     Objective measures displayed on progress notes unless otherwise noted      Treatment      Pat received therapeutic exercises to develop strength, endurance, ROM, flexibility, posture and core stabilization for 0 minutes including:    UT stretch (L side) 3 x 30"  LS stretch (L side) 3 x30"  Supine pec stretch over towel roll x 2 minutes  Supine thoracic stretch over towel roll x 2 minutes    Pat received the following manual therapy techniques: Joint mobilizations, Manual traction, and Soft tissue Mobilization were applied to the: L UE/cervical paraspinals for 30 minutes, including:    STM to L UT/LS  Suboccipital release  Gentle G/H oscillations   **all performed while on hot pack    Pat participated in neuromuscular re-education activities to improve: Balance, Coordination, Proprioception and Posture for 0 minutes. " "The following activities were included:    Scap retraction x 20 5" holds  Seated Chin tucks 2 x 10 3" holds      Home Exercises Provided and Patient Education Provided     Education provided:   - HEP review    Written Home Exercises Provided: Patient instructed to cont prior HEP.  Exercises were reviewed and Pat was able to demonstrate them prior to the end of the session.  Pat demonstrated good  understanding of the education provided.     See EMR under Patient Instructions for exercises provided prior visit.    Assessment     Pt noted to tolerate session poorly today due to extreme pain with very conservative measures. Due to inability to tolerate even light touch to cervical and B shoulder regions, pt was educated on benefit of further diagnostic follow up at this time. Due to abnormal rehab trend, pt will follow up with MD at this time but will continue to attend scheduled PT visits.    Pat Is not progressing well towards her goals.   Pt prognosis is Fair.     Pt will continue to benefit from skilled outpatient physical therapy to address the deficits listed in the problem list box on initial evaluation, provide pt/family education and to maximize pt's level of independence in the home and community environment.     Pt's spiritual, cultural and educational needs considered and pt agreeable to plan of care and goals.     Anticipated barriers to physical therapy: pmhx    Goals: Short Term Goals: 4 weeks   Pt to report worst pain 7/10  Pt to improve cervical range of motion by 25%  Pt to tolerate L shoulder range of motion assessment  Pt to tolerate UE strength assessment     Long Term Goals: 8 weeks   LTGs to come pending pt tolerance to further assessment  Plan      Plan of care Certification: 12/5/2024 to 1/30/2025.     Outpatient Physical Therapy 2 times weekly for 8 weeks to include the following interventions: Cervical/Lumbar Traction, Manual Therapy, Moist Heat/ Ice, Neuromuscular Re-ed, Patient Education, " Self Care, Therapeutic Activities, and Therapeutic Exercise.     Ernesto Mantilla, PT

## 2024-12-17 NOTE — TELEPHONE ENCOUNTER
----- Message from Layne sent at 12/17/2024  1:30 PM CST -----  Contact: self  Type:  Appointment Request    Caller is requesting a appointment.     Name of Caller:pt/ daughter     Would the patient rather a call back or a response via [facility]? Call back     Best Call Back Number:[phone]      Additional Information:  Please call back to advise. Thanks!

## 2024-12-17 NOTE — TELEPHONE ENCOUNTER
Pts daughter is requesting to be scheduled with neurosurg as her PT sessions have not been effective. Pt advised to contact her mother's PCP to discuss placing a referral to neurosurg for scheduling. Pts daughter verbalized understanding.

## 2024-12-18 ENCOUNTER — TELEPHONE (OUTPATIENT)
Dept: NEUROSURGERY | Facility: CLINIC | Age: 79
End: 2024-12-18
Payer: MEDICARE

## 2024-12-18 NOTE — TELEPHONE ENCOUNTER
Returned call. No answer. [facility]     ----- Message from Diego sent at 12/18/2024 10:11 AM CST -----  Type:  Sooner Appointment Request    Caller is requesting a sooner appointment.  Caller declined first available appointment listed below.  Caller will not accept being placed on the waitlist and is requesting a message be sent to doctor.    Name of Caller:  pt daughter  When is the first available appointment?  01.29.2025  Symptoms:  back pain   Would the patient rather a call back or a response via [facility]? Call back   Best Call Back Number:  [phone]  Additional Information:  [t daughter is calling for an appt with narciso posadas, pt has referral on file but pt can hardly walk or dress her self because of the pain and daughter would like sooner apt Please call back to advise. Thanks.

## 2024-12-18 NOTE — TELEPHONE ENCOUNTER
Called patient in regards to referral placed to Neurosurgery/Edi Bajwa MD. No updated imaging in chart. No answer. LVM to call [phone] for appointment scheduling

## 2024-12-24 ENCOUNTER — OFFICE VISIT (OUTPATIENT)
Dept: NEUROSURGERY | Facility: CLINIC | Age: 79
End: 2024-12-24
Payer: MEDICARE

## 2024-12-24 VITALS
WEIGHT: 134.25 LBS | BODY MASS INDEX: 24.7 KG/M2 | HEART RATE: 78 BPM | DIASTOLIC BLOOD PRESSURE: 91 MMHG | SYSTOLIC BLOOD PRESSURE: 152 MMHG | RESPIRATION RATE: 18 BRPM | HEIGHT: 62 IN

## 2024-12-24 DIAGNOSIS — M54.12 CERVICAL RADICULOPATHY: ICD-10-CM

## 2024-12-24 DIAGNOSIS — M50.30 DEGENERATIVE DISC DISEASE, CERVICAL: ICD-10-CM

## 2024-12-24 PROCEDURE — 3077F SYST BP >= 140 MM HG: CPT | Mod: CPTII,S$GLB,,

## 2024-12-24 PROCEDURE — 3080F DIAST BP >= 90 MM HG: CPT | Mod: CPTII,S$GLB,,

## 2024-12-24 PROCEDURE — 1125F AMNT PAIN NOTED PAIN PRSNT: CPT | Mod: CPTII,S$GLB,,

## 2024-12-24 PROCEDURE — 1101F PT FALLS ASSESS-DOCD LE1/YR: CPT | Mod: CPTII,S$GLB,,

## 2024-12-24 PROCEDURE — 1159F MED LIST DOCD IN RCRD: CPT | Mod: CPTII,S$GLB,,

## 2024-12-24 PROCEDURE — 3288F FALL RISK ASSESSMENT DOCD: CPT | Mod: CPTII,S$GLB,,

## 2024-12-24 PROCEDURE — 99214 OFFICE O/P EST MOD 30 MIN: CPT | Mod: S$GLB,,,

## 2024-12-24 NOTE — PROGRESS NOTES
Neurosurgery History & Physical    Patient ID: Pauline Rodriguez is a 79 y.o. female.    Chief Complaint   Patient presents with    [facility]       HPI:  Ms. Rodriguez is a 78yo female who presents today with her daughter for cervical radiculopathy.      She reports intermittent cervical pain, trapezius pain, and left shoulder pain for several months.  Denies any precipitating events.  Reports that it feels likes a knife is stabbing her from her left shoulder down jail in her left UE.  Reports pain is mostly left but can be right-sided as well.  Reports numbness and tingling to her left UE but states that it can travel to her left forearm and hand at times.  Denies bladder/bowel dysfunction.  Denies gait abnormality.      She is right-hand dominant.  She denies issues with her right arm/hand and reports no changes to her handwriting.  States that she has had difficulty gripping objects with her left hand since the end of October 2024.  Denies dropping objects once gripped.      Interventions: Tramadol, Voltaren gel, Tizanidine.  Currently participating in formal PT for her neck and left shoulder with last visit on 12/17/24.      Review of Systems   Constitutional:  Negative for activity change and fever.   Eyes:  Negative for visual disturbance.   Respiratory:  Negative for shortness of breath.    Cardiovascular:  Negative for chest pain.   Gastrointestinal:  Negative for nausea and vomiting.   Endocrine: Negative for cold intolerance, heat intolerance, polydipsia, polyphagia and polyuria.   Genitourinary:  Negative for decreased urine volume, difficulty urinating and frequency.   Musculoskeletal:  Positive for arthralgias, myalgias and neck pain. Negative for back pain and gait problem.   Neurological:  Positive for weakness and numbness. Negative for dizziness, tremors, seizures, syncope, facial asymmetry, speech difficulty, light-headedness and headaches.   Psychiatric/Behavioral:  Negative for confusion.         Past Medical History:   Diagnosis Date    Essential hypertension      Social History     Socioeconomic History    Marital status:    Tobacco Use    Smoking status: Never    Smokeless tobacco: Never   Substance and Sexual Activity    Alcohol use: No    Drug use: Never    Sexual activity: Not Currently     Birth control/protection: See Surgical Hx     Social Drivers of Health     Financial Resource Strain: Medium Risk (1/31/2024)    Overall Financial Resource Strain (CARDIA)     Difficulty of Paying Living Expenses: Somewhat hard   Food Insecurity: No Food Insecurity (1/31/2024)    Hunger Vital Sign     Worried About Running Out of Food in the Last Year: Never true     Ran Out of Food in the Last Year: Never true   Transportation Needs: No Transportation Needs (1/31/2024)    PRAPARE - Transportation     Lack of Transportation (Medical): No     Lack of Transportation (Non-Medical): No   Physical Activity: Inactive (1/31/2024)    Exercise Vital Sign     Days of Exercise per Week: 0 days     Minutes of Exercise per Session: 0 min   Stress: No Stress Concern Present (1/31/2024)    [employer] - Occupational Stress Questionnaire     Feeling of Stress : Not at all   Housing Stability: Low Risk  (1/31/2024)    Housing Stability Vital Sign     Unable to Pay for Housing in the Last Year: No     Number of Places Lived in the Last Year: 1     Unstable Housing in the Last Year: No     Family History   Problem Relation Name Age of Onset    Diabetes Maternal Aunt ally     Breast cancer Neg Hx      Ovarian cancer Neg Hx       Review of patient's allergies indicates:   Allergen Reactions    Latex, natural rubber Dermatitis       Current Outpatient Medications:     benazepril-hydrochlorthiazide (LOTENSIN HCT) 20-25 mg Tab, Take 1 tablet by mouth once daily., Disp: 90 tablet, Rfl: 3    dorzolamide-timolol 2-0.5% (COSOPT) 22.3-6.8 mg/mL ophthalmic solution, Place 1 drop into both eyes once  "daily. , Disp: , Rfl:     latanoprost 0.005 % ophthalmic solution, Place 1 drop into both eyes once daily. , Disp: , Rfl:     RESTASIS 0.05 % ophthalmic emulsion, Place 1 drop into both eyes 2 (two) times daily. , Disp: , Rfl:   Blood pressure (!) 152/91, pulse 78, resp. rate 18, height 5' 2" (1.575 m), weight 60.9 kg (134 lb 4.2 oz).      Neurological Exam  Mental Status  Awake, alert and oriented to person, place and time. Speech is normal. Language is fluent with no aphasia.    Cranial Nerves  CN II: Visual acuity is normal. Visual fields full to confrontation.  CN III, IV, VI: Extraocular movements intact bilaterally. Normal lids and orbits bilaterally. Pupils equal round and reactive to light bilaterally.  CN V: Facial sensation is normal.  CN VII: Full and symmetric facial movement.  CN VIII: Hearing is normal.  CN IX, X: Palate elevates symmetrically. Normal gag reflex.  CN XI: Shoulder shrug strength is normal.  CN XII: Tongue midline without atrophy or fasciculations.    Motor   Strength is 5/5 in all four extremities except as noted.  Right UE muscle strength intact 5/5.  Left UE exam limited due to pain.  Left tricep appears to be full strength while left bicep and deltoid difficult to test..    Sensory  Light touch is normal in upper and lower extremities.     Gait  Casual gait is normal including stance, stride, and arm swing.      Physical Exam  Eyes:      General: Lids are normal.      Extraocular Movements: Extraocular movements intact.      Pupils: Pupils are equal, round, and reactive to light.   Musculoskeletal:      Comments: Decreased left shoulder ROM   Psychiatric:         Speech: Speech normal.         Imaging:  X-ray cervical spine AP & lat dated 11/20/2024 personally reviewed and discussed with patient.  FINDINGS:  No apparent fracture or bony destructive process.  Smooth cervical kyphosis but no significant spondylolisthesis.  Severe disc height loss with prominent osteophyte production at " C5-C6 and C6-C7 as well as moderate disc height loss at C4-C5.  Mild disc height loss at C3-C4.  The C7-T1 level is not well assessed on the lateral view.  No abnormal prevertebral soft tissue thickening.     Impression:     As above.     Electronically signed by:Micah Landry  Date:                                            11/20/2024  Time:                                           15:00    Assessment/Plan:    Ms. Rodriguez is a 78yo female with cervical radiculopathy and DDD.  She is neurologically intact as best as can be examined since exam is pain limited in regard to her left bicep and deltoid.  She reports pain with slight touch which makes it difficult to test her strength in these muscle groups, especially combined with her left shoulder pain.  Her tricep appears to be full strength but her bicep and deltoid are difficult to test.  Ms. Rodriguez reports that she is opposed to surgical intervention at her age and states that she does not want to take a lot of medications for her condition.  However, she would like to understand what is causing her pain.  Therefore I ordered dynamic cervical x-rays and a MRI cervical spine without contrast to evaluate where pain may be located.  She should continue to attend PT to help improve her strength and flexibility.  After discussion of Pain Management referral, she initially declined but later decided that she will meet with Pain Management to discuss possible treatment modalities.  We will plan for Ms. Rodriguez to follow-up in 6 weeks to discuss her imaging results and to re-evaluate her progress.  She is agreeable to the plan and will notify our office if she has any questions or concerns in the meantime.

## 2024-12-26 DIAGNOSIS — Z78.0 MENOPAUSE: ICD-10-CM

## 2025-02-04 ENCOUNTER — HOSPITAL ENCOUNTER (OUTPATIENT)
Dept: RADIOLOGY | Facility: HOSPITAL | Age: 80
Discharge: HOME | End: 2025-02-04
Payer: MEDICARE

## 2025-02-04 ENCOUNTER — TELEPHONE (OUTPATIENT)
Dept: NEUROSURGERY | Facility: CLINIC | Age: 80
End: 2025-02-04
Payer: MEDICARE

## 2025-02-04 DIAGNOSIS — M50.30 DEGENERATIVE DISC DISEASE, CERVICAL: ICD-10-CM

## 2025-02-04 DIAGNOSIS — M54.12 CERVICAL RADICULOPATHY: ICD-10-CM

## 2025-02-04 PROCEDURE — 72050 X-RAY EXAM NECK SPINE 4/5VWS: CPT | Mod: TC,FY,PO

## 2025-02-04 PROCEDURE — 72050 X-RAY EXAM NECK SPINE 4/5VWS: CPT | Mod: 26,,, | Performed by: RADIOLOGY

## 2025-02-04 NOTE — TELEPHONE ENCOUNTER
----- Message from Anju sent at 2/4/2025  4:23 PM CST -----  Contact: Lupis daughter  Type:  Patient Returning Call    Who Called:  Lupis   Who Left Message for Patient:  Ashley  Does the patient know what this is regarding?:  appt  for mri today and her mom was not able to do it and you asked if the open MRI is covered by her insurance and it is covered so please send over order to [facility]  Best Call Back Number:  [phone]  Additional Information:  Call Lupis back after sending so she knows to get it edwige and thanks

## 2025-02-04 NOTE — TELEPHONE ENCOUNTER
----- Message from Elizabeth sent at 2/4/2025  1:21 PM CST -----  Regarding: advice  Contact: Lupis tineo  Type: Needs Medical Advice  Who Called:  Lupis Orellana daughter  Symptoms (please be specific):    How long has patient had these symptoms:    Pharmacy name and phone #:    Best Call Back Number: [phone]  Additional Information: Patient was unable to complete MRI due to claustrophobia.  Please call daughter to  advise.  Thanks!

## 2025-02-05 ENCOUNTER — PATIENT MESSAGE (OUTPATIENT)
Dept: NEUROSURGERY | Facility: CLINIC | Age: 80
End: 2025-02-05
Payer: MEDICARE

## 2025-02-07 ENCOUNTER — TELEPHONE (OUTPATIENT)
Dept: NEUROSURGERY | Facility: CLINIC | Age: 80
End: 2025-02-07
Payer: MEDICARE

## 2025-02-07 ENCOUNTER — TELEPHONE (OUTPATIENT)
Dept: PAIN MEDICINE | Facility: CLINIC | Age: 80
End: 2025-02-07
Payer: MEDICARE

## 2025-02-07 NOTE — TELEPHONE ENCOUNTER
Spoke with patient's daughter and moved up patient's visit to next Wednesday per her request for a sooner time

## 2025-02-07 NOTE — TELEPHONE ENCOUNTER
S/W Kathrine.  MRI order was signed and emailed per request.  Advised to contact me with any further needs.      ----- Message from Mark sent at 2/7/2025  1:23 PM CST -----  Type: Needs Medical Advice    Who Called:   [facility]rine from [facility]     Best Call Back Number: [phone]     Additional Information:  Kathrine from [facility] calling as Pt has clay on Monday for mri and they need a valid signed order from provider. They also have been having trouble receiving faxes from [facility], wants to know if office can email it back to them at [email or web address].  Please call back to advise, Thanks!

## 2025-02-11 ENCOUNTER — OFFICE VISIT (OUTPATIENT)
Dept: NEUROSURGERY | Facility: CLINIC | Age: 80
End: 2025-02-11
Payer: MEDICARE

## 2025-02-11 VITALS
BODY MASS INDEX: 24.7 KG/M2 | SYSTOLIC BLOOD PRESSURE: 156 MMHG | WEIGHT: 134.25 LBS | HEIGHT: 62 IN | RESPIRATION RATE: 18 BRPM | DIASTOLIC BLOOD PRESSURE: 82 MMHG

## 2025-02-11 DIAGNOSIS — M50.30 DEGENERATIVE DISC DISEASE, CERVICAL: ICD-10-CM

## 2025-02-11 DIAGNOSIS — M54.12 CERVICAL RADICULOPATHY: Primary | ICD-10-CM

## 2025-02-11 PROCEDURE — 3077F SYST BP >= 140 MM HG: CPT | Mod: HCNC,CPTII,S$GLB,

## 2025-02-11 PROCEDURE — 99213 OFFICE O/P EST LOW 20 MIN: CPT | Mod: HCNC,S$GLB,,

## 2025-02-11 PROCEDURE — 3079F DIAST BP 80-89 MM HG: CPT | Mod: HCNC,CPTII,S$GLB,

## 2025-02-11 PROCEDURE — 3288F FALL RISK ASSESSMENT DOCD: CPT | Mod: HCNC,CPTII,S$GLB,

## 2025-02-11 PROCEDURE — 1125F AMNT PAIN NOTED PAIN PRSNT: CPT | Mod: HCNC,CPTII,S$GLB,

## 2025-02-11 PROCEDURE — 1101F PT FALLS ASSESS-DOCD LE1/YR: CPT | Mod: HCNC,CPTII,S$GLB,

## 2025-02-11 NOTE — PROGRESS NOTES
"Neurosurgery History & Physical    Patient ID: Pauline Rodriguez is a 79 y.o. female.    Chief Complaint   Patient presents with    Follow-up     Image review       Interval HPI 02/11/2025:  Ms. Rodriguez returns today with her daughter for 6 week follow-up with image review.     Since her last visit, she reports doing the same.  Continues to report that her left arm pain is "excruciating" but lessens if she is sitting and watching TV (not using it).  States that although pain lessens, it is never completely gone.      Was unable to complete PT sessions due to left arm pain.  Has an appointment scheduled with Pain Management tomorrow.      HPI 12/24/2024:  Ms. Rodriguez is a 78yo female who presents today with her daughter for cervical radiculopathy.       She reports intermittent cervical pain, trapezius pain, and left shoulder pain for several months.  Denies any precipitating events.  Reports that it feels likes a knife is stabbing her from her left shoulder down longterm in her left UE.  Reports pain is mostly left but can be right-sided as well.  Reports numbness and tingling to her left UE but states that it can travel to her left forearm and hand at times.  Denies bladder/bowel dysfunction.  Denies gait abnormality.       She is right-hand dominant.  She denies issues with her right arm/hand and reports no changes to her handwriting.  States that she has had difficulty gripping objects with her left hand since the end of October 2024.  Denies dropping objects once gripped.       Interventions: Tramadol, Voltaren gel, Tizanidine.  Currently participating in formal PT for her neck and left shoulder with last visit on 12/17/24.      Review of Systems   Constitutional:  Negative for activity change and fever.   Eyes:  Negative for visual disturbance.   Respiratory:  Negative for shortness of breath.    Cardiovascular:  Negative for chest pain.   Gastrointestinal:  Negative for nausea and vomiting.   Endocrine: Negative for " cold intolerance, heat intolerance, polydipsia, polyphagia and polyuria.   Genitourinary:  Negative for decreased urine volume, difficulty urinating and frequency.   Musculoskeletal:  Positive for neck pain. Negative for back pain and gait problem.   Neurological:  Negative for dizziness, tremors, seizures, syncope, facial asymmetry, speech difficulty, weakness, light-headedness, numbness and headaches.   Psychiatric/Behavioral:  Negative for confusion.        Past Medical History:   Diagnosis Date    Essential hypertension      Social History     Socioeconomic History    Marital status:    Tobacco Use    Smoking status: Never    Smokeless tobacco: Never   Substance and Sexual Activity    Alcohol use: No    Drug use: Never    Sexual activity: Not Currently     Birth control/protection: See Surgical Hx     Social Drivers of Health     Financial Resource Strain: Medium Risk (1/31/2024)    Overall Financial Resource Strain (CARDIA)     Difficulty of Paying Living Expenses: Somewhat hard   Food Insecurity: No Food Insecurity (1/31/2024)    Hunger Vital Sign     Worried About Running Out of Food in the Last Year: Never true     Ran Out of Food in the Last Year: Never true   Transportation Needs: No Transportation Needs (1/31/2024)    PRAPARE - Transportation     Lack of Transportation (Medical): No     Lack of Transportation (Non-Medical): No   Physical Activity: Inactive (1/31/2024)    Exercise Vital Sign     Days of Exercise per Week: 0 days     Minutes of Exercise per Session: 0 min   Stress: No Stress Concern Present (1/31/2024)    [employer] - Occupational Stress Questionnaire     Feeling of Stress : Not at all   Housing Stability: Low Risk  (1/31/2024)    Housing Stability Vital Sign     Unable to Pay for Housing in the Last Year: No     Number of Places Lived in the Last Year: 1     Unstable Housing in the Last Year: No     Family History   Problem Relation Name Age of Onset     "Diabetes Maternal Aunt ally     Breast cancer Neg Hx      Ovarian cancer Neg Hx       Review of patient's allergies indicates:   Allergen Reactions    Latex, natural rubber Dermatitis       Current Outpatient Medications:     benazepril-hydrochlorthiazide (LOTENSIN HCT) 20-25 mg Tab, Take 1 tablet by mouth once daily., Disp: 90 tablet, Rfl: 3    dorzolamide-timolol 2-0.5% (COSOPT) 22.3-6.8 mg/mL ophthalmic solution, Place 1 drop into both eyes once daily. , Disp: , Rfl:     latanoprost 0.005 % ophthalmic solution, Place 1 drop into both eyes once daily. , Disp: , Rfl:     RESTASIS 0.05 % ophthalmic emulsion, Place 1 drop into both eyes 2 (two) times daily. , Disp: , Rfl:   Blood pressure (!) 156/82, resp. rate 18, height 5' 2" (1.575 m), weight 60.9 kg (134 lb 4.2 oz).      Neurological Exam  Mental Status  Awake, alert and oriented to person, place and time. Speech is normal. Language is fluent with no aphasia.    Cranial Nerves  CN II: Visual acuity is normal. Visual fields full to confrontation.  CN III, IV, VI: Extraocular movements intact bilaterally. Normal lids and orbits bilaterally. Pupils equal round and reactive to light bilaterally.  CN V: Facial sensation is normal.  CN VII: Full and symmetric facial movement.  CN VIII: Hearing is normal.  CN IX, X: Palate elevates symmetrically. Normal gag reflex.  CN XI: Shoulder shrug strength is normal.  CN XII: Tongue midline without atrophy or fasciculations.    Motor   Strength is 5/5 in all four extremities except as noted.  Exam pain limited left deltoid and left bicep.    Sensory  Light touch is normal in upper and lower extremities.     Gait  Casual gait is normal including stance, stride, and arm swing.      Physical Exam  Eyes:      General: Lids are normal.      Extraocular Movements: Extraocular movements intact.      Pupils: Pupils are equal, round, and reactive to light.   Psychiatric:         Speech: Speech normal.         Imaging:  X-ray cervical " spine AP & lat with flex/ex views dated 02/02/2025 personally reviewed and discussed with patient.    FINDINGS:  There is no acute abnormality or change in the appearance of the cervical spine compared to the prior study.  There is extensive degenerative change.  Vertebral body height is preserved.  There is no fracture.  There is trace anterolisthesis of C3 on C4 which may be positional or degenerative in etiology.  There is severe disc space narrowing at the C5-6 and C6-7 levels where there is endplate sclerosis and osteophyte formation.  There is moderate to severe disc space narrowing at the C4-5 level.  The odontoid process is intact.  There is multilevel facet joint arthropathy there is anterolisthesis of C4 on C5 with flexion.  Alignment is otherwise unchanged.  Soft tissues are normal.     Impression:     1. There is multilevel degenerative disc and facet disease.  There is no fracture.  2. In the neutral position there is subtle trace anterolisthesis of C3 on C4 which is unchanged with flexion or extension.  3. With flexion there is anterolisthesis of C4 on C5 measuring approximately 4 mm.  Alignment is normal in the neutral and extension positions.     Electronically signed by:Alec Tabares MD  Date:                                            02/04/2025  Time:                                           13:15    Assessment/Plan:    Ms. Rodriguez is a 78yo female with cervical spondylosis and DDD and left shoulder pain.  She brought a disc from [facility] with her MRI cervical spine images that she obtained on 02/10/2025.  I will have the disc taken to [facility] for upload into the [facility] system.  Once uploaded, I will review and call with treatment options.  She should continue to follow with Pain Management as previously scheduled.  She is agreeable to the plan and will notify our office if she has any questions or concerns in the meantime.

## 2025-02-12 ENCOUNTER — OFFICE VISIT (OUTPATIENT)
Dept: PAIN MEDICINE | Facility: CLINIC | Age: 80
End: 2025-02-12
Payer: MEDICARE

## 2025-02-12 VITALS
WEIGHT: 131.75 LBS | DIASTOLIC BLOOD PRESSURE: 93 MMHG | BODY MASS INDEX: 24.24 KG/M2 | HEART RATE: 59 BPM | HEIGHT: 62 IN | SYSTOLIC BLOOD PRESSURE: 181 MMHG

## 2025-02-12 DIAGNOSIS — G89.29 CHRONIC LEFT SHOULDER PAIN: Primary | ICD-10-CM

## 2025-02-12 DIAGNOSIS — M54.2 CERVICALGIA: ICD-10-CM

## 2025-02-12 DIAGNOSIS — M25.512 CHRONIC LEFT SHOULDER PAIN: Primary | ICD-10-CM

## 2025-02-12 PROCEDURE — 1125F AMNT PAIN NOTED PAIN PRSNT: CPT | Mod: CPTII,S$GLB,, | Performed by: ANESTHESIOLOGY

## 2025-02-12 PROCEDURE — 3080F DIAST BP >= 90 MM HG: CPT | Mod: CPTII,S$GLB,, | Performed by: ANESTHESIOLOGY

## 2025-02-12 PROCEDURE — 1101F PT FALLS ASSESS-DOCD LE1/YR: CPT | Mod: CPTII,S$GLB,, | Performed by: ANESTHESIOLOGY

## 2025-02-12 PROCEDURE — 1159F MED LIST DOCD IN RCRD: CPT | Mod: CPTII,S$GLB,, | Performed by: ANESTHESIOLOGY

## 2025-02-12 PROCEDURE — 3288F FALL RISK ASSESSMENT DOCD: CPT | Mod: CPTII,S$GLB,, | Performed by: ANESTHESIOLOGY

## 2025-02-12 PROCEDURE — 3077F SYST BP >= 140 MM HG: CPT | Mod: CPTII,S$GLB,, | Performed by: ANESTHESIOLOGY

## 2025-02-12 PROCEDURE — 1160F RVW MEDS BY RX/DR IN RCRD: CPT | Mod: CPTII,S$GLB,, | Performed by: ANESTHESIOLOGY

## 2025-02-12 PROCEDURE — 99999 PR PBB SHADOW E&M-EST. PATIENT-LVL IV: CPT | Mod: PBBFAC,,, | Performed by: ANESTHESIOLOGY

## 2025-02-12 PROCEDURE — 99204 OFFICE O/P NEW MOD 45 MIN: CPT | Mod: S$GLB,,, | Performed by: ANESTHESIOLOGY

## 2025-02-12 RX ORDER — DIAZEPAM 5 MG/1
5 TABLET ORAL ONCE AS NEEDED
Qty: 1 TABLET | Refills: 0 | Status: SHIPPED | OUTPATIENT
Start: 2025-02-12 | End: 2025-02-12

## 2025-02-12 NOTE — PROGRESS NOTES
[facility] New Patient Evaluation      Referred by: Cristiano Smith    PCP:     CC:   Chief Complaint   Patient presents with    Neck Pain    Shoulder Pain    Arm Pain     rightside          2/12/2025     9:23 AM   Last 3 PDI Scores   Pain Disability Index (PDI) 50         HPI:   Pauline Rodriguez is a 79 y.o. female patient who has a past medical history of Essential hypertension. She presents with pain in her left neck, shoulder, arm.  The pain started on 10/15/2025 without any specific trauma or inciting event.  Since that time she has had significant pain.  Today she reports her pain is 9/10, constant, sharp.  She can feel it go up from her shoulder towards the base of her neck and at times down her arm.      Pain Intervention History:      Past Spine Surgical History:      Past and current medications:  Antineuropathics:  NSAIDs:  Physical therapy:  Yes, had to stop due to worsening pain  Antidepressants:  Muscle relaxers:  Opioids:  Antiplatelets/Anticoagulants:    History:    Current Outpatient Medications:     benazepril-hydrochlorthiazide (LOTENSIN HCT) 20-25 mg Tab, Take 1 tablet by mouth once daily., Disp: 90 tablet, Rfl: 3    dorzolamide-timolol 2-0.5% (COSOPT) 22.3-6.8 mg/mL ophthalmic solution, Place 1 drop into both eyes once daily. , Disp: , Rfl:     latanoprost 0.005 % ophthalmic solution, Place 1 drop into both eyes once daily. , Disp: , Rfl:     RESTASIS 0.05 % ophthalmic emulsion, Place 1 drop into both eyes 2 (two) times daily. , Disp: , Rfl:     Past Medical History:   Diagnosis Date    Essential hypertension        Past Surgical History:   Procedure Laterality Date    CYST REMOVAL      HYSTERECTOMY      JOINT REPLACEMENT Right 07/01/2021    Hip replacement       Family History   Problem Relation Name Age of Onset    Diabetes Maternal Aunt ally     Breast cancer Neg Hx      Ovarian cancer Neg Hx         Social History     Socioeconomic History    Marital status:    Tobacco Use  "   Smoking status: Never    Smokeless tobacco: Never   Substance and Sexual Activity    Alcohol use: No    Drug use: Never    Sexual activity: Not Currently     Birth control/protection: See Surgical Hx     Social Drivers of Health     Financial Resource Strain: Medium Risk (1/31/2024)    Overall Financial Resource Strain (CARDIA)     Difficulty of Paying Living Expenses: Somewhat hard   Food Insecurity: No Food Insecurity (1/31/2024)    Hunger Vital Sign     Worried About Running Out of Food in the Last Year: Never true     Ran Out of Food in the Last Year: Never true   Transportation Needs: No Transportation Needs (1/31/2024)    PRAPARE - Transportation     Lack of Transportation (Medical): No     Lack of Transportation (Non-Medical): No   Physical Activity: Inactive (1/31/2024)    Exercise Vital Sign     Days of Exercise per Week: 0 days     Minutes of Exercise per Session: 0 min   Stress: No Stress Concern Present (1/31/2024)    [employer] - Occupational Stress Questionnaire     Feeling of Stress : Not at all   Housing Stability: Low Risk  (1/31/2024)    Housing Stability Vital Sign     Unable to Pay for Housing in the Last Year: No     Number of Places Lived in the Last Year: 1     Unstable Housing in the Last Year: No       Review of patient's allergies indicates:   Allergen Reactions    Latex, natural rubber Dermatitis       Review of Systems:  12 point review of systems is negative.    Physical Exam:  Vitals:    02/12/25 0926   BP: (!) 181/93   Pulse: (!) 59   Weight: 59.8 kg (131 lb 11.6 oz)   Height: 5' 2" (1.575 m)   PainSc:   8   PainLoc: Neck     Body mass index is 24.09 kg/m².    Gen: NAD  Psych: mood appropriate for given condition  HEENT: eyes anicteric   CV: RRR  HEENT: anicteric   Respiratory: non-labored, no signs of respiratory distress  Abd: non-distended  Skin: warm, dry and intact.  Gait: No antalgic gait.     Significant limitations with range of motion of the " left shoulder in all planes  Inability to raise left arm above shoulder  Significant pain with attempting to do empty can  Spurling's negative    Sensory:  Intact and symmetrical to light touch in C4-T1 dermatomes bilaterally.     Motor:    Right Left   C4 Shoulder Abduction  5  2   C5 Elbow Flexion    5  5   C6 Wrist Extension  5  5   C7 Elbow Extension   5  5   C8/T1 Hand Intrinsics   5  5        Right Left   Triceps DTR     Biceps DTR 2+ 2+        Patellar DTR 2+ 2+        Sepulveda Absent  Absent                 Labs:  Lab Results   Component Value Date    HGBA1C 5.2 02/18/2021       Lab Results   Component Value Date    WBC 8.15 11/20/2024    HGB 11.2 (L) 11/20/2024    HCT 33.3 (L) 11/20/2024    MCV 88 11/20/2024     11/20/2024           Imaging:  Xray cervical spine 2/4/25  FINDINGS:  There is no acute abnormality or change in the appearance of the cervical spine compared to the prior study.  There is extensive degenerative change.  Vertebral body height is preserved.  There is no fracture.  There is trace anterolisthesis of C3 on C4 which may be positional or degenerative in etiology.  There is severe disc space narrowing at the C5-6 and C6-7 levels where there is endplate sclerosis and osteophyte formation.  There is moderate to severe disc space narrowing at the C4-5 level.  The odontoid process is intact.  There is multilevel facet joint arthropathy there is anterolisthesis of C4 on C5 with flexion.  Alignment is otherwise unchanged.  Soft tissues are normal.    MRI cervical spine 2/10/25  FINDINGS:   Benign marrow signal. Atypical hemangiomata at C3 and C6.  Unremarkable included posterior fossa anatomy.  Broadly patent craniocervical junction. Normal included spinal cord volume and signal intensity.    Grade I anterolisthesis C3 on C4, Grade I retrolisthesis C4 on C5, C5 on C6 and C6 and C6-7.     Capacious canal    C2-3:  No disc herniation, visible neural compression, spinal stenosis or foraminal  stenosis.   C3-4:  Disc osteophyte complex partially effacing the thecal sac.  Mild foraminal narrowing on the left due to uncinate and facet arthrosis.  No spinal stenosis.   C4-5:  Severe disc height loss and marginal osteophytes.  Disc osteophyte complex partially effacing the thecal sac.  Moderate to severe foraminal stenosis bilaterally due to uncinate and facet arthrosis.  No spinal stenosis.   C5-6:  Moderate disc height loss with marginal osteophytes.  Disc osteophyte complex partially effacing the thecal sac.  Mild to moderate right and moderate left foraminal stenosis.  No spinal stenosis.   C6-7:  Severe disc height loss and marginal osteophytes.  Disc osteophyte complex partially effacing the thecal sac.  Moderate to severe right and severe left foraminal stenosis due to uncinate and facet arthrosis.  No spinal stenosis.   C7-T1:  No disc herniation, visible neural compression, spinal stenosis or foraminal stenosis.    OTHER FINDINGS: Unremarkable included paraspinal soft tissues.     Diagnosis:  1. Chronic left shoulder pain  -     Cancel: MRI Shoulder Without Contrast Left; Future; Expected date: 02/12/2025  -     MRI Shoulder Without Contrast Left; Future; Expected date: 02/12/2025    2. Cervicalgia          Pauline Rodriguez is a 79 y.o. female patient who has a past medical history of Essential hypertension. She presents with pain in her left neck, shoulder, arm.  The pain started on 10/15/2025 without any specific trauma or inciting event.  Since that time she has had significant pain.  Today she reports her pain is 9/10, constant, sharp.  She can feel it go up from her shoulder towards the base of her neck and at times down her arm.    - on exam she has significant limitation of range of motion limitations secondary to pain throughout all planes of her left shoulder.  She can not raise her arm on the left above her shoulder.  She has intact sensation to light touch bilateral C4-T1.  Otherwise full  strength in her upper extremities.  2+ bilateral biceps and patellar DTR.  Spurling's negative. Significant pain with attempting to do empty can  - I independently reviewed her cervical MRI and at C4-5 she has moderate-to-severe bilateral foraminal narrowing.  At C5-6 she has moderate left foraminal narrowing.  At C6-7 she has severe left foraminal narrowing.  I do not see any significant disc bulging  - she has a attempted physical therapy however had to stop due to severe pain in the left shoulder she can not participate  - her pain and physical exam today I believe his out of proportion to her cervical imaging  - I suspect that she has not intrinsic issue with her left shoulder.  She has an x-ray from November 24 that did not show any bony abnormalities.  I am going to order an MRI of her left shoulder for further evaluation.  We will call her once imaging complete discuss further treatment recommendations      : Not applicable    Escobar Jerez M.D.  Interventional Pain Medicine / Anesthesiology    This note was completed with dictation software and grammatical errors may exist.

## 2025-02-14 ENCOUNTER — PATIENT MESSAGE (OUTPATIENT)
Dept: FAMILY MEDICINE | Facility: CLINIC | Age: 80
End: 2025-02-14

## 2025-02-14 ENCOUNTER — E-VISIT (OUTPATIENT)
Dept: FAMILY MEDICINE | Facility: CLINIC | Age: 80
End: 2025-02-14
Payer: MEDICARE

## 2025-02-14 DIAGNOSIS — U07.1 COVID-19: Primary | ICD-10-CM

## 2025-02-17 NOTE — PROGRESS NOTES
Patient ID: Pauline Rodriguez is a 79 y.o. female.    Chief Complaint: General Illness (Entered automatically based on patient selection in [employer]ing[employer].)    The patient initiated a request through [employer]ing[employer] on 2025 for evaluation and management with a chief complaint of General Illness (Entered automatically based on patient selection in [employer]ing[employer].)     I evaluated the questionnaire submission on 2025.    [facility]-Cough And Cold    2025  3:28 PM CST - Filed by Lupis Orellana (Proxy)   What do you need help with? Covid 19   Do you agree to participate in an E-Visit? Yes   If you have any of the following symptoms, go to your local emergency room or call 911: I acknowledge   What is the main issue you would like addressed today? My daughter & son-mahendra that take care of me both have Covid. Now I have Covid too. Would it be possible to get a prescription for Paxlovid called into [facility] on [address]?   Do you think you might have COVID-19 or the Flu? COVID-19   Have you tested positive for COVID-19 or Flu? COVID-19   What symptoms do you have? Cough;  Headache;  Stuffy nose;  Muscle or body aches;  Sore throat   When did your symptoms first appear? 2025   What have you tried to help your symptoms? Drank fluids (water, tea)   Provide any additional information you feel is important.    Please attach any relevant images or files    Are you able to take your vital signs? No         Encounter Diagnosis   Name Primary?    COVID-19 Yes        No orders of the defined types were placed in this encounter.     Medications Ordered This Encounter   Medications    nirmatrelvir-ritonavir 150-100 mg DsPk     Sig: Take 2 tablets by mouth 2 (two) times daily for 5 days. Each dose contains 1 nirmatrelvir (pink tablet) and 1 ritonavir (white tablet). Take both tablets together     Dispense:  20 tablet     Refill:  0        No follow-ups on file.      E-Visit Time Trackin minutes       COVID-19:  New problem workup needed  -     nirmatrelvir-ritonavir 150-100 mg DsPk; Take 2 tablets by mouth 2 (two) times daily for 5 days. Each dose contains 1 nirmatrelvir (pink tablet) and 1 ritonavir (white tablet). Take both tablets together  Dispense: 20 tablet; Refill: 0        Will start the patient on Paxlovid, review side effects from other medications, based on kidney function, the patient can take this medication twice daily.  Recommend the patient's daughter to monitor her mom's blood pressure.

## 2025-02-21 DIAGNOSIS — Z00.00 ENCOUNTER FOR MEDICARE ANNUAL WELLNESS EXAM: ICD-10-CM

## 2025-02-28 ENCOUNTER — PATIENT MESSAGE (OUTPATIENT)
Dept: PAIN MEDICINE | Facility: CLINIC | Age: 80
End: 2025-02-28
Payer: MEDICARE

## 2025-02-28 DIAGNOSIS — M25.512 CHRONIC LEFT SHOULDER PAIN: Primary | ICD-10-CM

## 2025-02-28 DIAGNOSIS — G89.29 CHRONIC LEFT SHOULDER PAIN: Primary | ICD-10-CM

## 2025-03-03 NOTE — TELEPHONE ENCOUNTER
Spoke with daughter and her mom got the open MRI at [facility] last time, but she did not do well this time. Daughter is going to call around to see if there are any other options for a more open MRI and will message back

## 2025-03-03 NOTE — TELEPHONE ENCOUNTER
She had an MRI of her cervical spine on February 10th of this year.  How to they do it for that?  We should do it the same way

## 2025-03-05 NOTE — TELEPHONE ENCOUNTER
They found another open MRI place if you can change the order to go to [facility]. They also need another valium called in for her LOV 2/12/25

## 2025-03-06 RX ORDER — DIAZEPAM 5 MG/1
5 TABLET ORAL ONCE AS NEEDED
Qty: 1 TABLET | Refills: 0 | Status: SHIPPED | OUTPATIENT
Start: 2025-03-06 | End: 2025-03-06

## 2025-03-07 ENCOUNTER — PATIENT MESSAGE (OUTPATIENT)
Dept: ADMINISTRATIVE | Facility: HOSPITAL | Age: 80
End: 2025-03-07
Payer: MEDICARE

## 2025-03-13 ENCOUNTER — PATIENT MESSAGE (OUTPATIENT)
Dept: PAIN MEDICINE | Facility: CLINIC | Age: 80
End: 2025-03-13
Payer: MEDICARE

## 2025-03-19 ENCOUNTER — OFFICE VISIT (OUTPATIENT)
Dept: PAIN MEDICINE | Facility: CLINIC | Age: 80
End: 2025-03-19
Payer: MEDICARE

## 2025-03-19 VITALS
HEART RATE: 71 BPM | SYSTOLIC BLOOD PRESSURE: 158 MMHG | BODY MASS INDEX: 24.26 KG/M2 | HEIGHT: 62 IN | DIASTOLIC BLOOD PRESSURE: 92 MMHG | WEIGHT: 131.81 LBS

## 2025-03-19 DIAGNOSIS — M75.122 NONTRAUMATIC COMPLETE TEAR OF LEFT ROTATOR CUFF: Primary | ICD-10-CM

## 2025-03-19 PROCEDURE — 1159F MED LIST DOCD IN RCRD: CPT | Mod: HCNC,CPTII,S$GLB, | Performed by: ANESTHESIOLOGY

## 2025-03-19 PROCEDURE — 1125F AMNT PAIN NOTED PAIN PRSNT: CPT | Mod: HCNC,CPTII,S$GLB, | Performed by: ANESTHESIOLOGY

## 2025-03-19 PROCEDURE — 99213 OFFICE O/P EST LOW 20 MIN: CPT | Mod: HCNC,S$GLB,, | Performed by: ANESTHESIOLOGY

## 2025-03-19 PROCEDURE — 3080F DIAST BP >= 90 MM HG: CPT | Mod: HCNC,CPTII,S$GLB, | Performed by: ANESTHESIOLOGY

## 2025-03-19 PROCEDURE — 1160F RVW MEDS BY RX/DR IN RCRD: CPT | Mod: HCNC,CPTII,S$GLB, | Performed by: ANESTHESIOLOGY

## 2025-03-19 PROCEDURE — 1101F PT FALLS ASSESS-DOCD LE1/YR: CPT | Mod: HCNC,CPTII,S$GLB, | Performed by: ANESTHESIOLOGY

## 2025-03-19 PROCEDURE — 3288F FALL RISK ASSESSMENT DOCD: CPT | Mod: HCNC,CPTII,S$GLB, | Performed by: ANESTHESIOLOGY

## 2025-03-19 PROCEDURE — 3077F SYST BP >= 140 MM HG: CPT | Mod: HCNC,CPTII,S$GLB, | Performed by: ANESTHESIOLOGY

## 2025-03-19 PROCEDURE — 99999 PR PBB SHADOW E&M-EST. PATIENT-LVL III: CPT | Mod: PBBFAC,HCNC,, | Performed by: ANESTHESIOLOGY

## 2025-03-19 NOTE — PROGRESS NOTES
[facility] Follow Up Evaluation      Referred by: No ref. provider found    PCP:     CC:   Chief Complaint   Patient presents with    Shoulder Pain     Left  shoulder          3/19/2025    11:53 AM 2/12/2025     9:23 AM   Last 3 PDI Scores   Pain Disability Index (PDI) 45 50       Interval HPI 3/19/25: Ms. Rodriguez returns to the office for MRI review.  Unfortunately it has a little bit of a delay getting her MRI as she is severely claustrophobic.  Today she reports she continues to have significant left shoulder pain that can for up towards the base of her neck and down the proximal left arm.  She rates her pain as 10/10.    HPI:   Pauline Rodriguez is a 79 y.o. female patient who has a past medical history of Essential hypertension. She presents with pain in her left neck, shoulder, arm.  The pain started on 10/15/2025 without any specific trauma or inciting event.  Since that time she has had significant pain.  Today she reports her pain is 9/10, constant, sharp.  She can feel it go up from her shoulder towards the base of her neck and at times down her arm.      Pain Intervention History:      Past Spine Surgical History:      Past and current medications:  Antineuropathics:  NSAIDs:  Physical therapy:  Yes, had to stop due to worsening pain  Antidepressants:  Muscle relaxers:  Opioids:  Antiplatelets/Anticoagulants:    History:    Current Outpatient Medications:     benazepril-hydrochlorthiazide (LOTENSIN HCT) 20-25 mg Tab, Take 1 tablet by mouth once daily., Disp: 90 tablet, Rfl: 3    dorzolamide-timolol 2-0.5% (COSOPT) 22.3-6.8 mg/mL ophthalmic solution, Place 1 drop into both eyes once daily. , Disp: , Rfl:     latanoprost 0.005 % ophthalmic solution, Place 1 drop into both eyes once daily. , Disp: , Rfl:     RESTASIS 0.05 % ophthalmic emulsion, Place 1 drop into both eyes 2 (two) times daily. , Disp: , Rfl:     diazePAM (VALIUM) 5 MG tablet, Take 1 tablet (5 mg total) by mouth once as needed for Anxiety  (20-30 minutes prior to MRI. cannot drive while taking)., Disp: 1 tablet, Rfl: 0    Past Medical History:   Diagnosis Date    Essential hypertension        Past Surgical History:   Procedure Laterality Date    CYST REMOVAL      HYSTERECTOMY      JOINT REPLACEMENT Right 07/01/2021    Hip replacement       Family History   Problem Relation Name Age of Onset    Diabetes Maternal Aunt ally     Breast cancer Neg Hx      Ovarian cancer Neg Hx         Social History     Socioeconomic History    Marital status:    Tobacco Use    Smoking status: Never    Smokeless tobacco: Never   Substance and Sexual Activity    Alcohol use: No    Drug use: Never    Sexual activity: Not Currently     Birth control/protection: See Surgical Hx     Social Drivers of Health     Financial Resource Strain: Medium Risk (1/31/2024)    Overall Financial Resource Strain (CARDIA)     Difficulty of Paying Living Expenses: Somewhat hard   Food Insecurity: No Food Insecurity (1/31/2024)    Hunger Vital Sign     Worried About Running Out of Food in the Last Year: Never true     Ran Out of Food in the Last Year: Never true   Transportation Needs: No Transportation Needs (1/31/2024)    PRAPARE - Transportation     Lack of Transportation (Medical): No     Lack of Transportation (Non-Medical): No   Physical Activity: Inactive (1/31/2024)    Exercise Vital Sign     Days of Exercise per Week: 0 days     Minutes of Exercise per Session: 0 min   Stress: No Stress Concern Present (1/31/2024)    [employer] - Occupational Stress Questionnaire     Feeling of Stress : Not at all   Housing Stability: Low Risk  (1/31/2024)    Housing Stability Vital Sign     Unable to Pay for Housing in the Last Year: No     Number of Places Lived in the Last Year: 1     Unstable Housing in the Last Year: No       Review of patient's allergies indicates:   Allergen Reactions    Latex, natural rubber Dermatitis       Review of Systems:  12 point  "review of systems is negative.    Physical Exam:  Vitals:    03/19/25 1155   BP: (!) 158/92   Pulse: 71   Weight: 59.8 kg (131 lb 13.4 oz)   Height: 5' 2" (1.575 m)   PainSc: 10-Worst pain ever   PainLoc: Shoulder     Body mass index is 24.11 kg/m².    Gen: NAD  Psych: mood appropriate for given condition  HEENT: eyes anicteric   CV: RRR  HEENT: anicteric   Respiratory: non-labored, no signs of respiratory distress  Abd: non-distended  Skin: warm, dry and intact.  Gait: No antalgic gait.     Significant limitations with range of motion of the left shoulder in all planes  Inability to raise left arm above shoulder  Significant pain with attempting to do empty can  Spurling's negative    Sensory:  Intact and symmetrical to light touch in C4-T1 dermatomes bilaterally.     Motor:    Right Left   C4 Shoulder Abduction  5  2   C5 Elbow Flexion    5  5   C6 Wrist Extension  5  5   C7 Elbow Extension   5  5   C8/T1 Hand Intrinsics   5  5        Right Left   Triceps DTR     Biceps DTR 2+ 2+        Patellar DTR 2+ 2+        Sepulveda Absent  Absent                 Labs:  Lab Results   Component Value Date    HGBA1C 5.2 02/18/2021       Lab Results   Component Value Date    WBC 8.15 11/20/2024    HGB 11.2 (L) 11/20/2024    HCT 33.3 (L) 11/20/2024    MCV 88 11/20/2024     11/20/2024         Imaging:  Xray cervical spine 2/4/25  FINDINGS:  There is no acute abnormality or change in the appearance of the cervical spine compared to the prior study.  There is extensive degenerative change.  Vertebral body height is preserved.  There is no fracture.  There is trace anterolisthesis of C3 on C4 which may be positional or degenerative in etiology.  There is severe disc space narrowing at the C5-6 and C6-7 levels where there is endplate sclerosis and osteophyte formation.  There is moderate to severe disc space narrowing at the C4-5 level.  The odontoid process is intact.  There is multilevel facet joint arthropathy there is " anterolisthesis of C4 on C5 with flexion.  Alignment is otherwise unchanged.  Soft tissues are normal.    MRI cervical spine 2/10/25  FINDINGS:   Benign marrow signal. Atypical hemangiomata at C3 and C6.  Unremarkable included posterior fossa anatomy.  Broadly patent craniocervical junction. Normal included spinal cord volume and signal intensity.    Grade I anterolisthesis C3 on C4, Grade I retrolisthesis C4 on C5, C5 on C6 and C6 and C6-7.     Capacious canal    C2-3:  No disc herniation, visible neural compression, spinal stenosis or foraminal stenosis.   C3-4:  Disc osteophyte complex partially effacing the thecal sac.  Mild foraminal narrowing on the left due to uncinate and facet arthrosis.  No spinal stenosis.   C4-5:  Severe disc height loss and marginal osteophytes.  Disc osteophyte complex partially effacing the thecal sac.  Moderate to severe foraminal stenosis bilaterally due to uncinate and facet arthrosis.  No spinal stenosis.   C5-6:  Moderate disc height loss with marginal osteophytes.  Disc osteophyte complex partially effacing the thecal sac.  Mild to moderate right and moderate left foraminal stenosis.  No spinal stenosis.   C6-7:  Severe disc height loss and marginal osteophytes.  Disc osteophyte complex partially effacing the thecal sac.  Moderate to severe right and severe left foraminal stenosis due to uncinate and facet arthrosis.  No spinal stenosis.   C7-T1:  No disc herniation, visible neural compression, spinal stenosis or foraminal stenosis.    OTHER FINDINGS: Unremarkable included paraspinal soft tissues.     MRI left shoulder 3/12/29  MRI report from her left shoulder shows there is some severe motion artifact however Radiology described complete tear of infraspinatus tendon with retraction to nearly the glenoid with possible full-thickness tearing of the posterior supraspinatus tendon      Diagnosis:  1. Nontraumatic complete tear of left rotator cuff  -     Ambulatory  referral/consult to Orthopedics; Future; Expected date: 03/26/2025        Pauline Rodriguez is a 79 y.o. female patient who has a past medical history of Essential hypertension. She presents with pain in her left neck, shoulder, arm.  The pain started on 10/15/2025 without any specific trauma or inciting event.  Since that time she has had significant pain.  Today she reports her pain is 9/10, constant, sharp.  She can feel it go up from her shoulder towards the base of her neck and at times down her arm.    3/19/25 - Ms. Rodriguez returns to the office for MRI review.  Unfortunately it has a little bit of a delay getting her MRI as she is severely claustrophobic.  Today she reports she continues to have significant left shoulder pain that can for up towards the base of her neck and down the proximal left arm.  She rates her pain as 10/10.    - on exam she has significant limitation of range of motion limitations secondary to pain throughout all planes of her left shoulder.  She can not raise her arm on the left above her shoulder.  She has intact sensation to light touch bilateral C4-T1.  Otherwise full strength in her upper extremities.  2+ bilateral biceps and patellar DTR.  Spurling's negative. Significant pain with attempting to do empty can    - I independently reviewed her cervical MRI and at C4-5 she has moderate-to-severe bilateral foraminal narrowing.  At C5-6 she has moderate left foraminal narrowing.  At C6-7 she has severe left foraminal narrowing.  I do not see any significant disc bulging    - she has a attempted physical therapy however had to stop due to severe pain in the left shoulder she can not participate    - her pain and physical exam I believe his out of proportion to her cervical imaging    - MRI report from her left shoulder shows there is some severe motion artifact however Radiology described complete tear of infraspinatus tendon with retraction to nearly the glenoid with possible full-thickness  tearing of the posterior supraspinatus tendon    - I have recommended she see an orthopedic shoulder specialist for an evaluation and placed a referral for her.  She can follow up with me on an as needed basis      : Not applicable    Escobar Jerez M.D.  Interventional Pain Medicine / Anesthesiology    This note was completed with dictation software and grammatical errors may exist.

## 2025-03-24 ENCOUNTER — TELEPHONE (OUTPATIENT)
Dept: PAIN MEDICINE | Facility: CLINIC | Age: 80
End: 2025-03-24
Payer: MEDICARE

## 2025-03-24 NOTE — TELEPHONE ENCOUNTER
----- Message from Karine sent at 3/24/2025  9:56 AM CDT -----  Contact: :pt daughter Wes Baugh  Type: Needs Medical Advice Who Called:pt daughter Wes Baugh Best Call Back Number:[phone] Information: Requesting a call back regarding  daughter asking if office can call Ortho and see if office can get her appt moved to be seen sooner. Ortho has her set for May but daughter is stating that is to long to wait. Ortho said Dr Jerez can talk to Dr Palmer to get appt moved. Felipe Palmer II, MD  - Ortho - Appt set for May 20thPlease Advise- Thank you

## 2025-03-25 ENCOUNTER — TELEPHONE (OUTPATIENT)
Dept: PAIN MEDICINE | Facility: CLINIC | Age: 80
End: 2025-03-25
Payer: MEDICARE

## 2025-03-25 NOTE — TELEPHONE ENCOUNTER
Spoke with patient's daughter and she said [facility] MRI disc (which is still being mailed back to them). I called the clinic and explained where they could find the images and they said that works. Called patient's daughter and explained that she is good to go

## 2025-04-03 ENCOUNTER — PATIENT MESSAGE (OUTPATIENT)
Dept: PAIN MEDICINE | Facility: CLINIC | Age: 80
End: 2025-04-03
Payer: MEDICARE

## 2025-04-03 NOTE — TELEPHONE ENCOUNTER
Spoke with daughter Lupis Orellana and scheduled pt with Felipe to discuss cervical injections per Dr. Jerez. Daughter verbally accepted date and time.

## 2025-04-03 NOTE — TELEPHONE ENCOUNTER
Please let the patient know I would be happy to try a neck injection to see if it would improve any of her symptoms    Please schedule her a follow-up with either myself, Luis Wang, Minda at their earliest convenience.  It is okay to double book on my schedule if there are no early availabilities.

## 2025-04-04 ENCOUNTER — TELEPHONE (OUTPATIENT)
Dept: PAIN MEDICINE | Facility: CLINIC | Age: 80
End: 2025-04-04

## 2025-04-04 ENCOUNTER — OFFICE VISIT (OUTPATIENT)
Dept: PAIN MEDICINE | Facility: CLINIC | Age: 80
End: 2025-04-04
Payer: MEDICARE

## 2025-04-04 VITALS
DIASTOLIC BLOOD PRESSURE: 85 MMHG | HEIGHT: 62 IN | HEART RATE: 74 BPM | BODY MASS INDEX: 23.59 KG/M2 | WEIGHT: 128.19 LBS | SYSTOLIC BLOOD PRESSURE: 161 MMHG

## 2025-04-04 DIAGNOSIS — M47.812 CERVICAL SPONDYLOSIS: ICD-10-CM

## 2025-04-04 DIAGNOSIS — M54.12 CERVICAL RADICULOPATHY: Primary | ICD-10-CM

## 2025-04-04 DIAGNOSIS — M50.30 DDD (DEGENERATIVE DISC DISEASE), CERVICAL: ICD-10-CM

## 2025-04-04 PROCEDURE — 99999 PR PBB SHADOW E&M-EST. PATIENT-LVL III: CPT | Mod: PBBFAC,HCNC,,

## 2025-04-04 NOTE — TELEPHONE ENCOUNTER
Please schedule patient for the following procedure:    Physician - Dr Jerez    Type of Procedure/Injection - Cervical Epidural  C7/T1    with spread to the left       Laterality - NA      Priority - Normal      Anxiolysis- RNIV      Need to hold medication - Yes      NSAIDs for 2 days    None      Clearance needed - No      Follow up - 3 week  with Dr. Jerez

## 2025-04-04 NOTE — PROGRESS NOTES
[facility] Follow Up Evaluation      Referred by: No ref. provider found    PCP:     CC:   Chief Complaint   Patient presents with    Injections    Neck Pain    Arm Pain    Shoulder Pain          4/4/2025     3:37 PM 3/19/2025    11:53 AM 2/12/2025     9:23 AM   Last 3 PDI Scores   Pain Disability Index (PDI) 37 45 50     Interval HPI 4/4/2025: Pauline Rodriguez returns to the office for follow up.  She returns for follow-up with continued left-sided neck pain with radiation down left arm into left hand, 6-10/10, worsened with certain activities.  She reports intermittent numbness and tingling in left arm.  No pain down right arm.  She reports significant pain limited weakness with left arm.  Denies any new changes to her bowel or bladder function.  No relief with NSAIDs.  Recent injection into left shoulder with orthopedics provided zero relief.      HPI:   Pauline Rodriguez is a 79 y.o. female patient who has a past medical history of Essential hypertension. She presents with pain in her left neck, shoulder, arm.  The pain started on 10/15/2025 without any specific trauma or inciting event.  Since that time she has had significant pain.  Today she reports her pain is 9/10, constant, sharp.  She can feel it go up from her shoulder towards the base of her neck and at times down her arm.      Pain Intervention History:      Past Spine Surgical History:      Past and current medications:  Antineuropathics:  NSAIDs:  Physical therapy:  Yes, had to stop due to worsening pain  Antidepressants:  Muscle relaxers:  Opioids:  Antiplatelets/Anticoagulants:    History:    Current Outpatient Medications:     benazepril-hydrochlorthiazide (LOTENSIN HCT) 20-25 mg Tab, Take 1 tablet by mouth once daily., Disp: 90 tablet, Rfl: 3    dorzolamide-timolol 2-0.5% (COSOPT) 22.3-6.8 mg/mL ophthalmic solution, Place 1 drop into both eyes once daily. , Disp: , Rfl:     latanoprost 0.005 % ophthalmic solution, Place 1 drop into both  eyes once daily. , Disp: , Rfl:     RESTASIS 0.05 % ophthalmic emulsion, Place 1 drop into both eyes 2 (two) times daily. , Disp: , Rfl:     Past Medical History:   Diagnosis Date    Essential hypertension        Past Surgical History:   Procedure Laterality Date    CYST REMOVAL      HYSTERECTOMY      JOINT REPLACEMENT Right 07/01/2021    Hip replacement       Family History   Problem Relation Name Age of Onset    Diabetes Maternal Aunt ally     Breast cancer Neg Hx      Ovarian cancer Neg Hx         Social History     Socioeconomic History    Marital status:    Tobacco Use    Smoking status: Never    Smokeless tobacco: Never   Substance and Sexual Activity    Alcohol use: No    Drug use: Never    Sexual activity: Not Currently     Birth control/protection: See Surgical Hx     Social Drivers of Health     Financial Resource Strain: Medium Risk (1/31/2024)    Overall Financial Resource Strain (CARDIA)     Difficulty of Paying Living Expenses: Somewhat hard   Food Insecurity: No Food Insecurity (1/31/2024)    Hunger Vital Sign     Worried About Running Out of Food in the Last Year: Never true     Ran Out of Food in the Last Year: Never true   Transportation Needs: No Transportation Needs (1/31/2024)    PRAPARE - Transportation     Lack of Transportation (Medical): No     Lack of Transportation (Non-Medical): No   Physical Activity: Inactive (1/31/2024)    Exercise Vital Sign     Days of Exercise per Week: 0 days     Minutes of Exercise per Session: 0 min   Stress: No Stress Concern Present (1/31/2024)    [employer] - Occupational Stress Questionnaire     Feeling of Stress : Not at all   Housing Stability: Low Risk  (1/31/2024)    Housing Stability Vital Sign     Unable to Pay for Housing in the Last Year: No     Number of Places Lived in the Last Year: 1     Unstable Housing in the Last Year: No       Review of patient's allergies indicates:  No Known Allergies      Review of  "Systems:  12 point review of systems is negative.    Physical Exam:  Vitals:    04/04/25 1539   BP: (!) 161/85   Pulse: 74   Weight: 58.2 kg (128 lb 3.2 oz)   Height: 5' 2" (1.575 m)   PainSc:   2   PainLoc: Shoulder     Body mass index is 23.45 kg/m².    Gen: NAD  Psych: mood appropriate for given condition  HEENT: eyes anicteric   CV: RRR  HEENT: anicteric   Respiratory: non-labored, no signs of respiratory distress  Abd: non-distended  Skin: warm, dry and intact.  Gait: No antalgic gait.     Significant limitations with range of motion of the left shoulder in all planes  Inability to raise left arm above shoulder  Significant pain with attempting to do empty can  Spurling's negative    Sensory:  Intact and symmetrical to light touch in C4-T1 dermatomes bilaterally.     Motor:    Right Left   C4 Shoulder Abduction  5  2   C5 Elbow Flexion    5  5   C6 Wrist Extension  5  5   C7 Elbow Extension   5  5   C8/T1 Hand Intrinsics   5  5        Right Left   Triceps DTR     Biceps DTR 2+ 2+        Patellar DTR 2+ 2+        Sepulveda Absent  Absent                 Labs:  Lab Results   Component Value Date    HGBA1C 5.2 02/18/2021       Lab Results   Component Value Date    WBC 8.15 11/20/2024    HGB 11.2 (L) 11/20/2024    HCT 33.3 (L) 11/20/2024    MCV 88 11/20/2024     11/20/2024         Imaging:  Xray cervical spine 2/4/25  FINDINGS:  There is no acute abnormality or change in the appearance of the cervical spine compared to the prior study.  There is extensive degenerative change.  Vertebral body height is preserved.  There is no fracture.  There is trace anterolisthesis of C3 on C4 which may be positional or degenerative in etiology.  There is severe disc space narrowing at the C5-6 and C6-7 levels where there is endplate sclerosis and osteophyte formation.  There is moderate to severe disc space narrowing at the C4-5 level.  The odontoid process is intact.  There is multilevel facet joint arthropathy there is " anterolisthesis of C4 on C5 with flexion.  Alignment is otherwise unchanged.  Soft tissues are normal.    MRI cervical spine 2/10/25  FINDINGS:   Benign marrow signal. Atypical hemangiomata at C3 and C6.  Unremarkable included posterior fossa anatomy.  Broadly patent craniocervical junction. Normal included spinal cord volume and signal intensity.    Grade I anterolisthesis C3 on C4, Grade I retrolisthesis C4 on C5, C5 on C6 and C6 and C6-7.     Capacious canal    C2-3:  No disc herniation, visible neural compression, spinal stenosis or foraminal stenosis.   C3-4:  Disc osteophyte complex partially effacing the thecal sac.  Mild foraminal narrowing on the left due to uncinate and facet arthrosis.  No spinal stenosis.   C4-5:  Severe disc height loss and marginal osteophytes.  Disc osteophyte complex partially effacing the thecal sac.  Moderate to severe foraminal stenosis bilaterally due to uncinate and facet arthrosis.  No spinal stenosis.   C5-6:  Moderate disc height loss with marginal osteophytes.  Disc osteophyte complex partially effacing the thecal sac.  Mild to moderate right and moderate left foraminal stenosis.  No spinal stenosis.   C6-7:  Severe disc height loss and marginal osteophytes.  Disc osteophyte complex partially effacing the thecal sac.  Moderate to severe right and severe left foraminal stenosis due to uncinate and facet arthrosis.  No spinal stenosis.   C7-T1:  No disc herniation, visible neural compression, spinal stenosis or foraminal stenosis.    OTHER FINDINGS: Unremarkable included paraspinal soft tissues.     MRI left shoulder 3/12/29  MRI report from her left shoulder shows there is some severe motion artifact however Radiology described complete tear of infraspinatus tendon with retraction to nearly the glenoid with possible full-thickness tearing of the posterior supraspinatus tendon      Diagnosis:  1. Cervical radiculopathy    2. Cervical spondylosis    3. DDD (degenerative disc  disease), cervical          Pauline Rodriguez is a 79 y.o. female patient who has a past medical history of Essential hypertension. She presents with pain in her left neck, shoulder, arm.  The pain started on 10/15/2025 without any specific trauma or inciting event.  Since that time she has had significant pain.  Today she reports her pain is 9/10, constant, sharp.  She can feel it go up from her shoulder towards the base of her neck and at times down her arm.    4/4/2025: Pauline Rodriguez returns to the office for follow up.  She returns for follow-up with continued left-sided neck pain with radiation down left arm into left hand, 6-10/10, worsened with certain activities.  She reports intermittent numbness and tingling in left arm.  No pain down right arm.  She reports significant pain limited weakness with left arm.  Denies any new changes to her bowel or bladder function.  No relief with NSAIDs.  Recent injection into left shoulder with orthopedics provided zero relief.    - on exam she has significant limitation of range of motion limitations secondary to pain throughout all planes of her left shoulder.  She can not raise her arm on the left above her shoulder due to the severity of the pain.  She has intact sensation to light touch bilateral C4-T1.  Otherwise full strength in her upper extremities.   - I independently reviewed her cervical MRI and at C4-5 she has moderate-to-severe bilateral foraminal narrowing.  At C5-6 she has moderate left foraminal narrowing.  At C6-7 she has severe left foraminal narrowing.  I do not see any significant disc bulging  - she likely has overlapping issues, pain intrinsic to her left shoulder but also cervical radicular pain.  Unfortunately, most recent intra-articular left shoulder injection with orthopedics a few days ago did not provide relief.  - she continues to have pain that limits her mobility interferes with her ADLs and quality of life.  No relief with NSAIDs.  - pain is  too severe to restart formal physical therapy.  - to address any cervical radicular component I would like to schedule her for a cervical C7/T1 interlaminar epidural steroid injection.  - follow up 3 weeks post procedure or sooner if needed.      : Not applicable      This note was completed with dictation software and grammatical errors may exist.

## 2025-04-08 DIAGNOSIS — M54.12 CERVICAL RADICULOPATHY: Primary | ICD-10-CM

## 2025-04-08 RX ORDER — SODIUM CHLORIDE, SODIUM LACTATE, POTASSIUM CHLORIDE, CALCIUM CHLORIDE 600; 310; 30; 20 MG/100ML; MG/100ML; MG/100ML; MG/100ML
INJECTION, SOLUTION INTRAVENOUS CONTINUOUS
OUTPATIENT
Start: 2025-04-08

## 2025-04-08 NOTE — TELEPHONE ENCOUNTER
Spoke with patient's daughter and scheduled. Advised to hold NSAIDS x 2 days prior.Pre op information given to patient and follow up appointment scheduled.

## 2025-04-16 ENCOUNTER — PATIENT MESSAGE (OUTPATIENT)
Dept: PAIN MEDICINE | Facility: CLINIC | Age: 80
End: 2025-04-16
Payer: MEDICARE

## 2025-04-16 NOTE — TELEPHONE ENCOUNTER
Spoke with patient's daughter and canceled procedure and follow up because she didn't want to do it anymore

## 2025-05-20 ENCOUNTER — OFFICE VISIT (OUTPATIENT)
Dept: FAMILY MEDICINE | Facility: CLINIC | Age: 80
End: 2025-05-20
Payer: MEDICARE

## 2025-05-20 VITALS
HEIGHT: 62 IN | SYSTOLIC BLOOD PRESSURE: 156 MMHG | HEART RATE: 80 BPM | DIASTOLIC BLOOD PRESSURE: 92 MMHG | BODY MASS INDEX: 23.82 KG/M2 | WEIGHT: 129.44 LBS | OXYGEN SATURATION: 96 %

## 2025-05-20 DIAGNOSIS — I10 ESSENTIAL HYPERTENSION: ICD-10-CM

## 2025-05-20 DIAGNOSIS — Z91.148 NONCOMPLIANCE WITH MEDICATION REGIMEN: ICD-10-CM

## 2025-05-20 DIAGNOSIS — M25.511 ACUTE PAIN OF RIGHT SHOULDER: Primary | ICD-10-CM

## 2025-05-20 DIAGNOSIS — N18.32 STAGE 3B CHRONIC KIDNEY DISEASE: ICD-10-CM

## 2025-05-20 PROCEDURE — 3080F DIAST BP >= 90 MM HG: CPT | Mod: CPTII,HCNC,S$GLB, | Performed by: PHYSICIAN ASSISTANT

## 2025-05-20 PROCEDURE — 3077F SYST BP >= 140 MM HG: CPT | Mod: CPTII,HCNC,S$GLB, | Performed by: PHYSICIAN ASSISTANT

## 2025-05-20 PROCEDURE — 99214 OFFICE O/P EST MOD 30 MIN: CPT | Mod: HCNC,S$GLB,, | Performed by: PHYSICIAN ASSISTANT

## 2025-05-20 PROCEDURE — 1160F RVW MEDS BY RX/DR IN RCRD: CPT | Mod: CPTII,HCNC,S$GLB, | Performed by: PHYSICIAN ASSISTANT

## 2025-05-20 PROCEDURE — 99999 PR PBB SHADOW E&M-EST. PATIENT-LVL III: CPT | Mod: PBBFAC,HCNC,, | Performed by: PHYSICIAN ASSISTANT

## 2025-05-20 PROCEDURE — 1101F PT FALLS ASSESS-DOCD LE1/YR: CPT | Mod: CPTII,HCNC,S$GLB, | Performed by: PHYSICIAN ASSISTANT

## 2025-05-20 PROCEDURE — 3288F FALL RISK ASSESSMENT DOCD: CPT | Mod: CPTII,HCNC,S$GLB, | Performed by: PHYSICIAN ASSISTANT

## 2025-05-20 PROCEDURE — 1159F MED LIST DOCD IN RCRD: CPT | Mod: CPTII,HCNC,S$GLB, | Performed by: PHYSICIAN ASSISTANT

## 2025-05-20 PROCEDURE — 1125F AMNT PAIN NOTED PAIN PRSNT: CPT | Mod: CPTII,HCNC,S$GLB, | Performed by: PHYSICIAN ASSISTANT

## 2025-05-20 NOTE — PROGRESS NOTES
"Subjective:      Patient ID: Pauline Rodirguez is a 79 y.o. female.    Chief Complaint: Shoulder Pain (Patient states that she woke up this morning with right shoulder pain. She has had some pain in the same shoulder before but not as bad. Patient has a large knot on her shoulder that is VERY painful to the touch. )    HPI  Patient's active medical issues include HTN, CKD3a, and ankylosing spondylitis.    Patient reports right shoulder pain today.  Not taken any medication for this.  No injury or fall noted.    CKD3b-not drinking much water    Not taking blood pressure medication daily.  BP Readings from Last 3 Encounters:   05/20/25 (!) 156/92   04/04/25 (!) 161/85   03/19/25 (!) 158/92      Review of Systems   Constitutional:  Negative for chills and fever.   Respiratory:  Negative for shortness of breath.    Cardiovascular:  Negative for chest pain.   Musculoskeletal:  Positive for arthralgias.       Objective:   BP (!) 156/92   Pulse 80   Ht 5' 2" (1.575 m)   Wt 58.7 kg (129 lb 6.6 oz)   SpO2 96%   BMI 23.67 kg/m²     Physical Exam  Vitals reviewed.   Constitutional:       Appearance: Normal appearance. She is well-developed.   HENT:      Head: Normocephalic and atraumatic.      Right Ear: External ear normal.      Left Ear: External ear normal.   Eyes:      Conjunctiva/sclera: Conjunctivae normal.   Cardiovascular:      Rate and Rhythm: Normal rate and regular rhythm.      Heart sounds: Normal heart sounds. No murmur heard.     No friction rub. No gallop.   Pulmonary:      Effort: Pulmonary effort is normal. No respiratory distress.      Breath sounds: Normal breath sounds. No wheezing, rhonchi or rales.   Musculoskeletal:         General: Normal range of motion.      Right shoulder: Tenderness present. Normal range of motion.   Skin:     General: Skin is warm and dry.      Findings: No rash.   Neurological:      General: No focal deficit present.      Mental Status: She is alert and oriented to person, place, " and time.   Psychiatric:         Mood and Affect: Mood normal.         Behavior: Behavior normal.         Judgment: Judgment normal.       Assessment:      1. Acute pain of right shoulder    2. Stage 3b chronic kidney disease    3. Essential hypertension    4. Noncompliance with medication regimen       Plan:   1. Acute pain of right shoulder (Primary)  Encouraged to take tylenol 1000mg BID for pain.    2. Stage 3b chronic kidney disease  Encouraged to drink more water daily.    3. Essential hypertension  Uncontrolled.      4. Noncompliance with medication regimen  Encouraged to take medications as prescribed, but declines.    Follow up as scheduled.  Patient agreed with plan and expressed understanding.    Thank you for allowing me to serve you,

## 2025-05-29 PROBLEM — N18.32 STAGE 3B CHRONIC KIDNEY DISEASE: Status: ACTIVE | Noted: 2025-05-29

## 2025-05-29 PROBLEM — N18.31 CHRONIC KIDNEY DISEASE, STAGE 3A: Status: RESOLVED | Noted: 2024-11-20 | Resolved: 2025-05-29

## 2025-05-29 PROBLEM — Z91.148 NONCOMPLIANCE WITH MEDICATION REGIMEN: Status: ACTIVE | Noted: 2025-05-29

## 2025-06-26 ENCOUNTER — TELEPHONE (OUTPATIENT)
Dept: FAMILY MEDICINE | Facility: CLINIC | Age: 80
End: 2025-06-26
Payer: MEDICARE

## 2025-06-26 NOTE — TELEPHONE ENCOUNTER
Copied from CRM #[id number]. Topic: General Inquiry - Patient Advice  >> Jun 26, 2025  8:04 NITZA Up wrote:  Type:  Needs Medical Advice    Who Called: daughter Wes Baugh  Symptoms (please be specific): caller is requesting dr order a urine specimen for pt, she would also like to speak to nurse regarding her mom asap to let her know what has been going on.    Would the patient rather a call back or a response via [facility]? call  Best Call Back Number:   Additional Information: please advise and thank you.

## 2025-07-08 ENCOUNTER — OFFICE VISIT (OUTPATIENT)
Dept: FAMILY MEDICINE | Facility: CLINIC | Age: 80
End: 2025-07-08
Payer: MEDICARE

## 2025-07-08 ENCOUNTER — LAB VISIT (OUTPATIENT)
Dept: LAB | Facility: HOSPITAL | Age: 80
End: 2025-07-08
Attending: FAMILY MEDICINE
Payer: MEDICARE

## 2025-07-08 VITALS
DIASTOLIC BLOOD PRESSURE: 82 MMHG | WEIGHT: 126.44 LBS | HEART RATE: 73 BPM | SYSTOLIC BLOOD PRESSURE: 138 MMHG | BODY MASS INDEX: 23.13 KG/M2 | OXYGEN SATURATION: 99 %

## 2025-07-08 DIAGNOSIS — I10 ESSENTIAL HYPERTENSION: ICD-10-CM

## 2025-07-08 DIAGNOSIS — N18.32 STAGE 3B CHRONIC KIDNEY DISEASE: ICD-10-CM

## 2025-07-08 DIAGNOSIS — N39.0 BACTERIAL UTI: ICD-10-CM

## 2025-07-08 DIAGNOSIS — R30.0 DYSURIA: Primary | ICD-10-CM

## 2025-07-08 DIAGNOSIS — M45.9 ANKYLOSING SPONDYLITIS, UNSPECIFIED SITE OF SPINE: ICD-10-CM

## 2025-07-08 DIAGNOSIS — A49.9 BACTERIAL UTI: ICD-10-CM

## 2025-07-08 LAB
ANION GAP (OHS): 9 MMOL/L (ref 8–16)
BUN SERPL-MCNC: 23 MG/DL (ref 8–23)
CALCIUM SERPL-MCNC: 9.4 MG/DL (ref 8.7–10.5)
CHLORIDE SERPL-SCNC: 107 MMOL/L (ref 95–110)
CO2 SERPL-SCNC: 26 MMOL/L (ref 23–29)
CREAT SERPL-MCNC: 1 MG/DL (ref 0.5–1.4)
ERYTHROCYTE [DISTWIDTH] IN BLOOD BY AUTOMATED COUNT: 13 % (ref 11.5–14.5)
GFR SERPLBLD CREATININE-BSD FMLA CKD-EPI: 57 ML/MIN/1.73/M2
GLUCOSE SERPL-MCNC: 92 MG/DL (ref 70–110)
HCT VFR BLD AUTO: 34.4 % (ref 37–48.5)
HGB BLD-MCNC: 10.7 GM/DL (ref 12–16)
MCH RBC QN AUTO: 28.8 PG (ref 27–31)
MCHC RBC AUTO-ENTMCNC: 31.1 G/DL (ref 32–36)
MCV RBC AUTO: 93 FL (ref 82–98)
PLATELET # BLD AUTO: 303 K/UL (ref 150–450)
PMV BLD AUTO: 10.2 FL (ref 9.2–12.9)
POTASSIUM SERPL-SCNC: 4.3 MMOL/L (ref 3.5–5.1)
RBC # BLD AUTO: 3.71 M/UL (ref 4–5.4)
SODIUM SERPL-SCNC: 142 MMOL/L (ref 136–145)
WBC # BLD AUTO: 7.89 K/UL (ref 3.9–12.7)

## 2025-07-08 PROCEDURE — 99214 OFFICE O/P EST MOD 30 MIN: CPT | Mod: HCNC,S$GLB,, | Performed by: FAMILY MEDICINE

## 2025-07-08 PROCEDURE — 80048 BASIC METABOLIC PNL TOTAL CA: CPT | Mod: HCNC

## 2025-07-08 PROCEDURE — 3079F DIAST BP 80-89 MM HG: CPT | Mod: CPTII,HCNC,S$GLB, | Performed by: FAMILY MEDICINE

## 2025-07-08 PROCEDURE — 36415 COLL VENOUS BLD VENIPUNCTURE: CPT | Mod: HCNC,PO

## 2025-07-08 PROCEDURE — 1159F MED LIST DOCD IN RCRD: CPT | Mod: CPTII,HCNC,S$GLB, | Performed by: FAMILY MEDICINE

## 2025-07-08 PROCEDURE — 85027 COMPLETE CBC AUTOMATED: CPT | Mod: HCNC

## 2025-07-08 PROCEDURE — 3075F SYST BP GE 130 - 139MM HG: CPT | Mod: CPTII,HCNC,S$GLB, | Performed by: FAMILY MEDICINE

## 2025-07-08 PROCEDURE — 99999 PR PBB SHADOW E&M-EST. PATIENT-LVL III: CPT | Mod: PBBFAC,HCNC,, | Performed by: FAMILY MEDICINE

## 2025-07-08 RX ORDER — GRANULES FOR ORAL 3 G/1
3 POWDER ORAL ONCE
Qty: 3 G | Refills: 0 | Status: SHIPPED | OUTPATIENT
Start: 2025-07-08 | End: 2025-07-08

## 2025-07-10 NOTE — TELEPHONE ENCOUNTER
Called to scheduled Medicare AWV - Patient declined appointment -  does not want to be called again   
Applied

## 2025-07-14 NOTE — PROGRESS NOTES
Assessment:       1. Dysuria    2. Bacterial UTI    3. Stage 3b chronic kidney disease    4. Essential hypertension    5. Ankylosing spondylitis, unspecified site of spine        Plan:       Dysuria: New problem workup needed  -     Urinalysis ($); Future; Expected date: 07/08/2025    Bacterial UTI: New problem workup needed  -     fosfomycin (MONUROL) 3 gram Pack; Take 3 g by mouth once. for 1 dose  Dispense: 3 g; Refill: 0    Stage 3b chronic kidney disease: Worsening  -     CBC Without Differential; Future; Expected date: 07/08/2025  -     Basic Metabolic Panel; Future; Expected date: 07/08/2025    Essential hypertension: Stable.  The patient has stop taking her medications for blood pressure.  Recommend to monitor the blood pressure home more often.    Ankylosing spondylitis, unspecified site of spine: Stable.  The patient did not see the rheumatologist saw the pain management doctor lately.  She is currently not taking any anti-inflammatories, this is secondary to chronic kidney disease.              Patient agreed with assessment and plan. Patient verbalized understanding.     Subjective:       Patient ID: Pauline Rodriguez is a 79 y.o. female.    Chief Complaint: Urinary Tract Infection    HPI    The patient here today for symptoms of increased frequency of urination and dysuria for more than 2 weeks.  The patient is not been taking antibiotics, she is coming here for assessment, the patient has chronic kidney disease stage IIIB, she has not been taking her blood pressure medication, she did not bring a log of the blood pressure readings from home.  She also has ankylosis spondylitis which was seen the rheumatologist for but she not see Dr. Hester for more than 2 years.  She denies any symptoms of fever, chest pain and shortness of breath at this visit.  The patient is coming here today accompanied by her daughter.    Past medical history, past social history was reviewed and discussed with the patient.    Review  of Systems    Objective:      Physical Exam  Constitutional:       General: She is not in acute distress.     Appearance: Normal appearance. She is not toxic-appearing.   HENT:      Head: Normocephalic and atraumatic.   Cardiovascular:      Rate and Rhythm: Normal rate and regular rhythm.   Pulmonary:      Effort: Pulmonary effort is normal. No respiratory distress.      Breath sounds: No wheezing.   Abdominal:      General: Abdomen is flat. Bowel sounds are normal. There is no distension.      Tenderness: There is no abdominal tenderness. There is no right CVA tenderness or left CVA tenderness.   Musculoskeletal:      Right lower leg: No edema.      Left lower leg: No edema.   Neurological:      Mental Status: She is alert.   Psychiatric:         Mood and Affect: Mood normal.         Behavior: Behavior normal.         Thought Content: Thought content normal.         Judgment: Judgment normal.

## (undated) DEVICE — CLOSURE SKIN 1X5 STERI-STRIP

## (undated) DEVICE — TROCAR ENDOPATH XCEL 5MM 7.5CM

## (undated) DEVICE — SCISSOR 5MMX35CM DIRECT DRIVE

## (undated) DEVICE — SUT MCRYL PLUS 4-0 PS2 27IN

## (undated) DEVICE — SEE MEDLINE ITEM 146372

## (undated) DEVICE — NDL INSUF ULTRA VERESS 120MM

## (undated) DEVICE — TUBING HF INSUFFLATION W/ FLTR

## (undated) DEVICE — ADHESIVE MASTISOL VIAL 48/BX

## (undated) DEVICE — ADHESIVE DERMABOND ADVANCED

## (undated) DEVICE — ELECTRODE REM PLYHSV RETURN 9

## (undated) DEVICE — KIT ANTIFOG

## (undated) DEVICE — BAG TISS RETRV MONARCH 10MM

## (undated) DEVICE — DEVICE CLOSURE TROCAR SITE

## (undated) DEVICE — SEALANT EVICEL FIBRIN HUMN 2ML

## (undated) DEVICE — SUT 1 48IN PDS II VIO MONO

## (undated) DEVICE — SEALER AQUAMANTYS 3.48MM

## (undated) DEVICE — SUT 0 VICRYL / UR6 (J603)

## (undated) DEVICE — BLADE SURG CARBON STEEL SZ11

## (undated) DEVICE — TROCAR ENDOPATH XCEL 5X75MM

## (undated) DEVICE — SEALER LIGASURE IMPACT 18CM

## (undated) DEVICE — SEE MEDLINE ITEM 157128

## (undated) DEVICE — GOWN SURGICAL X-LARGE

## (undated) DEVICE — SHEARS HARMONIC 5CM 36CM

## (undated) DEVICE — SEALER LIGASURE LAP 37CM 5MM

## (undated) DEVICE — Device

## (undated) DEVICE — STAPLER SKIN ROTATING HEAD

## (undated) DEVICE — TRAY MINOR GEN SURG

## (undated) DEVICE — DRAPE INCISE IOBAN 2 23X17IN

## (undated) DEVICE — KIT GELPOINT ADV ACC PLATFORM

## (undated) DEVICE — SEE MEDLINE ITEM 157117

## (undated) DEVICE — TROCAR ENDOPATH XCEL 12MM 10CM

## (undated) DEVICE — SEALER LIGASURE MARYLAND 37CM

## (undated) DEVICE — NDL HYPO REG 25G X 1 1/2

## (undated) DEVICE — DRAPE ABDOMINAL TIBURON 14X11

## (undated) DEVICE — APPLICATOR CHLORAPREP ORN 26ML

## (undated) DEVICE — SOL IRR NACL .9% 3000ML